# Patient Record
Sex: FEMALE | Race: WHITE | NOT HISPANIC OR LATINO | Employment: OTHER | URBAN - METROPOLITAN AREA
[De-identification: names, ages, dates, MRNs, and addresses within clinical notes are randomized per-mention and may not be internally consistent; named-entity substitution may affect disease eponyms.]

---

## 2017-03-15 ENCOUNTER — LAB VISIT (OUTPATIENT)
Dept: LAB | Facility: HOSPITAL | Age: 52
End: 2017-03-15
Attending: INTERNAL MEDICINE
Payer: MEDICAID

## 2017-03-15 DIAGNOSIS — E06.3 CHRONIC LYMPHOCYTIC THYROIDITIS: ICD-10-CM

## 2017-03-15 DIAGNOSIS — E03.9 UNSPECIFIED HYPOTHYROIDISM: ICD-10-CM

## 2017-03-15 LAB
T3FREE SERPL-MCNC: 3.5 PG/ML
T4 FREE SERPL-MCNC: 1.12 NG/DL
TSH SERPL DL<=0.005 MIU/L-ACNC: 0.03 UIU/ML

## 2017-03-15 PROCEDURE — 84439 ASSAY OF FREE THYROXINE: CPT

## 2017-03-15 PROCEDURE — 84481 FREE ASSAY (FT-3): CPT

## 2017-03-15 PROCEDURE — 36415 COLL VENOUS BLD VENIPUNCTURE: CPT | Mod: PO

## 2017-03-15 PROCEDURE — 84443 ASSAY THYROID STIM HORMONE: CPT

## 2017-06-23 ENCOUNTER — LAB VISIT (OUTPATIENT)
Dept: LAB | Facility: HOSPITAL | Age: 52
End: 2017-06-23
Attending: INTERNAL MEDICINE
Payer: MEDICAID

## 2017-06-23 DIAGNOSIS — E03.9 UNSPECIFIED HYPOTHYROIDISM: Primary | ICD-10-CM

## 2017-06-23 DIAGNOSIS — E03.9 UNSPECIFIED HYPOTHYROIDISM: ICD-10-CM

## 2017-06-23 LAB
T3FREE SERPL-MCNC: 2.7 PG/ML
T4 FREE SERPL-MCNC: 0.85 NG/DL
TSH SERPL DL<=0.005 MIU/L-ACNC: 7.56 UIU/ML

## 2017-06-23 PROCEDURE — 84443 ASSAY THYROID STIM HORMONE: CPT

## 2017-06-23 PROCEDURE — 84481 FREE ASSAY (FT-3): CPT

## 2017-06-23 PROCEDURE — 84439 ASSAY OF FREE THYROXINE: CPT

## 2017-06-23 PROCEDURE — 36415 COLL VENOUS BLD VENIPUNCTURE: CPT | Mod: PO

## 2017-07-06 ENCOUNTER — HOSPITAL ENCOUNTER (OUTPATIENT)
Dept: RADIOLOGY | Facility: CLINIC | Age: 52
Discharge: HOME | End: 2017-07-06
Attending: INTERNAL MEDICINE
Payer: MEDICAID

## 2017-07-06 DIAGNOSIS — Z78.0 MENOPAUSE: ICD-10-CM

## 2017-07-06 DIAGNOSIS — Z78.0 MENOPAUSE: Primary | ICD-10-CM

## 2017-07-06 PROCEDURE — 77080 DXA BONE DENSITY AXIAL: CPT | Mod: 26,,, | Performed by: RADIOLOGY

## 2017-07-06 PROCEDURE — 77080 DXA BONE DENSITY AXIAL: CPT | Mod: TC,PO

## 2017-10-16 ENCOUNTER — LAB VISIT (OUTPATIENT)
Dept: LAB | Facility: HOSPITAL | Age: 52
End: 2017-10-16
Attending: INTERNAL MEDICINE
Payer: MEDICAID

## 2017-10-16 DIAGNOSIS — E06.3 CHRONIC LYMPHOCYTIC THYROIDITIS: ICD-10-CM

## 2017-10-16 DIAGNOSIS — E03.9 MYXEDEMA HEART DISEASE: ICD-10-CM

## 2017-10-16 DIAGNOSIS — I51.9 MYXEDEMA HEART DISEASE: Primary | ICD-10-CM

## 2017-10-16 DIAGNOSIS — I51.9 MYXEDEMA HEART DISEASE: ICD-10-CM

## 2017-10-16 DIAGNOSIS — E03.9 MYXEDEMA HEART DISEASE: Primary | ICD-10-CM

## 2017-10-16 PROCEDURE — 84439 ASSAY OF FREE THYROXINE: CPT

## 2017-10-16 PROCEDURE — 84443 ASSAY THYROID STIM HORMONE: CPT

## 2017-10-16 PROCEDURE — 36415 COLL VENOUS BLD VENIPUNCTURE: CPT | Mod: PO

## 2017-10-17 LAB
T4 FREE SERPL-MCNC: 0.88 NG/DL
TSH SERPL DL<=0.005 MIU/L-ACNC: 2.53 UIU/ML

## 2017-11-09 ENCOUNTER — DOCUMENTATION ONLY (OUTPATIENT)
Dept: FAMILY MEDICINE | Facility: CLINIC | Age: 52
End: 2017-11-09

## 2017-11-09 ENCOUNTER — TELEPHONE (OUTPATIENT)
Dept: FAMILY MEDICINE | Facility: CLINIC | Age: 52
End: 2017-11-09

## 2017-11-09 NOTE — TELEPHONE ENCOUNTER
----- Message from Elsi Drew sent at 11/9/2017  1:40 PM CST -----  Patient is experiencing stomach issues/would like to be seen today or tomorrow/no appointment showing available/please call patient back at [phone] to schedule or advise.

## 2017-11-09 NOTE — PROGRESS NOTES
Pre-Visit Chart Review  For Appointment Scheduled on 11/10/2017    Health Maintenance Due   Topic Date Due    TETANUS VACCINE  01/12/1982    Pap Smear with HPV Cotest  01/12/1985    Mammogram  01/12/2004    Colonoscopy  01/12/2014    Influenza Vaccine  08/01/2017

## 2017-11-09 NOTE — TELEPHONE ENCOUNTER
Patient has complaints of stomach ache,cramping and gas requesting appointment. Appointment scheduled.

## 2017-11-10 ENCOUNTER — TELEPHONE (OUTPATIENT)
Dept: FAMILY MEDICINE | Facility: CLINIC | Age: 52
End: 2017-11-10

## 2017-11-10 ENCOUNTER — OFFICE VISIT (OUTPATIENT)
Dept: FAMILY MEDICINE | Facility: CLINIC | Age: 52
End: 2017-11-10
Payer: MEDICAID

## 2017-11-10 ENCOUNTER — HOSPITAL ENCOUNTER (OUTPATIENT)
Dept: RADIOLOGY | Facility: CLINIC | Age: 52
Discharge: HOME | End: 2017-11-10
Attending: PHYSICIAN ASSISTANT
Payer: MEDICAID

## 2017-11-10 VITALS
HEART RATE: 86 BPM | SYSTOLIC BLOOD PRESSURE: 122 MMHG | BODY MASS INDEX: 25.84 KG/M2 | WEIGHT: 140.44 LBS | TEMPERATURE: 99 F | DIASTOLIC BLOOD PRESSURE: 75 MMHG | HEIGHT: 62 IN

## 2017-11-10 DIAGNOSIS — R10.13 EPIGASTRIC PAIN: ICD-10-CM

## 2017-11-10 DIAGNOSIS — R10.13 EPIGASTRIC PAIN: Primary | ICD-10-CM

## 2017-11-10 PROCEDURE — 99214 OFFICE O/P EST MOD 30 MIN: CPT | Mod: S$PBB,,, | Performed by: PHYSICIAN ASSISTANT

## 2017-11-10 PROCEDURE — 76705 ECHO EXAM OF ABDOMEN: CPT | Mod: 26,,, | Performed by: RADIOLOGY

## 2017-11-10 PROCEDURE — 76705 ECHO EXAM OF ABDOMEN: CPT | Mod: TC,PO

## 2017-11-10 PROCEDURE — 99213 OFFICE O/P EST LOW 20 MIN: CPT | Mod: PBBFAC,25,PO | Performed by: PHYSICIAN ASSISTANT

## 2017-11-10 PROCEDURE — 99999 PR PBB SHADOW E&M-EST. PATIENT-LVL III: CPT | Mod: PBBFAC,,, | Performed by: PHYSICIAN ASSISTANT

## 2017-11-10 RX ORDER — PANTOPRAZOLE SODIUM 40 MG/1
40 TABLET, DELAYED RELEASE ORAL DAILY
Qty: 30 TABLET | Refills: 2 | Status: SHIPPED | OUTPATIENT
Start: 2017-11-10 | End: 2021-09-16 | Stop reason: DRUGHIGH

## 2017-11-10 NOTE — PROGRESS NOTES
Subjective:       Patient ID: Leobardo Rueda is a 52 y.o. female.    Chief Complaint: Abdominal Pain; Abdominal Cramping; and Gas    Abdominal Pain   This is a new problem. The current episode started in the past 7 days. The onset quality is gradual. The problem occurs intermittently. The problem has been waxing and waning. The pain is located in the epigastric region. The pain is at a severity of 7/10. The quality of the pain is colicky, aching and a sensation of fullness. The abdominal pain does not radiate. Associated symptoms include belching, flatus and nausea. Pertinent negatives include no arthralgias, constipation, diarrhea, fever, frequency, headaches, hematochezia, hematuria, melena, myalgias or vomiting. The pain is relieved by nothing. Treatments tried: peptobismol. The treatment provided mild relief. There is no history of abdominal surgery, Crohn's disease, gallstones, GERD, irritable bowel syndrome, pancreatitis, PUD or ulcerative colitis. Patient's medical history does not include kidney stones.     Review of Systems   Constitutional: Negative for activity change, appetite change, chills, fatigue and fever.   Eyes: Negative for visual disturbance.   Respiratory: Negative for cough and shortness of breath.    Cardiovascular: Negative for chest pain, palpitations and leg swelling.   Gastrointestinal: Positive for abdominal pain, flatus and nausea. Negative for constipation, diarrhea, hematochezia, melena and vomiting.   Genitourinary: Negative for frequency and hematuria.   Musculoskeletal: Negative for arthralgias and myalgias.   Neurological: Negative for dizziness, weakness, light-headedness and headaches.       Objective:      Vitals:    11/10/17 1005   BP: 122/75   Pulse: 86   Temp: 99 °F (37.2 °C)     Physical Exam   Constitutional: She appears well-developed and well-nourished.   HENT:   Head: Normocephalic and atraumatic.   Right Ear: Hearing and external ear normal.   Left Ear: Hearing and  external ear normal.   Nose: Nose normal.   Mouth/Throat: Oropharynx is clear and moist.   Eyes: Conjunctivae and EOM are normal. Pupils are equal, round, and reactive to light.   Cardiovascular: Normal rate, regular rhythm, normal heart sounds and intact distal pulses.    Pulmonary/Chest: Effort normal and breath sounds normal.   Abdominal: Soft. Bowel sounds are normal. There is tenderness in the epigastric area and left upper quadrant. There is no rigidity, no rebound, no guarding and negative Li's sign.   Skin: Skin is warm and dry.   Vitals reviewed.      Assessment:       1. Epigastric pain        Plan:       Epigastric pain  -     CBC auto differential; Future; Expected date: 11/24/2017  -     Comprehensive metabolic panel; Future; Expected date: 11/24/2017  -     Amylase; Future; Expected date: 11/24/2017  -     Lipase; Future; Expected date: 11/24/2017        -     US Abdomen Limited; Future; Expected date: 11/10/2017  -     pantoprazole (PROTONIX) 40 MG tablet; Take 1 tablet (40 mg total) by mouth once daily.  Dispense: 30 tablet; Refill: 2         Will make further recommendations pending above work up    Follow up in 2 weeks  Will address health maintenance at follow up visits.

## 2017-11-10 NOTE — TELEPHONE ENCOUNTER
----- Message from Hortensia Watkins sent at 11/10/2017  3:08 PM CST -----  Contact: self  Call placed to pod.  Patient is returning your call, please call.

## 2017-11-10 NOTE — TELEPHONE ENCOUNTER
Patient state that she start medication today and is still having stomach pain. Advise patient to give medication a chance to work, to give medication a week and if not better to call office. Patient verbalized understanding.

## 2017-11-11 ENCOUNTER — NURSE TRIAGE (OUTPATIENT)
Dept: ADMINISTRATIVE | Facility: CLINIC | Age: 52
End: 2017-11-11

## 2017-11-12 NOTE — TELEPHONE ENCOUNTER
Reason for Disposition   Headache (all triage questions negative)    Protocols used: ST HEADACHE-A-AH    Patient thinks she is having headaches because it is a side effect of the protonix. Patient advised to call her pharmacy to see if this is a side effect, but she was advised to go to urgent care tomorrow to see if they could prescribe another class of antacid medication to help with stomach pain. Patient verbalized understanding.

## 2017-11-13 ENCOUNTER — TELEPHONE (OUTPATIENT)
Dept: FAMILY MEDICINE | Facility: CLINIC | Age: 52
End: 2017-11-13

## 2017-11-13 DIAGNOSIS — R74.8 ELEVATED LIPASE: ICD-10-CM

## 2017-11-13 DIAGNOSIS — R10.13 EPIGASTRIC PAIN: Primary | ICD-10-CM

## 2017-11-13 NOTE — TELEPHONE ENCOUNTER
Please let her know that one of her pancreatic enzymes is slightly elevated; however, this is not suggestive of acute pancreatitis until a much higher level. In addition, abdominal US showed normal pancreas. Electrolytes, liver enzymes, and kidney function test are normal. Blood counts are also normal. Recommend increasing water intake and avoid fatty foods. Avoid alcohol. Recommend bland diet. Continue protonix as prescribed. I would like to refer her to GI for further evaluation.    If she develops any worsening abdominal pain or nausea/vomiting then I recommend she go to ER.

## 2017-11-13 NOTE — TELEPHONE ENCOUNTER
----- Message from [place], [place] sent at 11/13/2017 11:27 AM CST -----  Contact: self [phone]      ----- Message -----  From: Marybeth rG  Sent: 11/13/2017   7:56 AM  To: Jessica [facility]ALLERO Staff    Please call her with the results of her lab tests.  Also she is still having symptoms. Medication helps some but symptoms come back with a vengeance.  She still cannot eat. Thank you!

## 2017-11-13 NOTE — TELEPHONE ENCOUNTER
Patient notified that her pancreatic enzymes is slightly elevated; however, this is not suggestive of acute pancreatitis until a much higher level. In addition, abdominal US showed normal pancreas. Electrolytes, liver enzymes, and kidney function test are normal. Blood counts are also normal. Recommend increasing water intake and avoid fatty foods. Avoid alcohol. Recommend bland diet. Continue protonix as prescribed. I would like to refer her to GI for further evaluation.     If she develops any worsening abdominal pain or nausea/vomiting then I recommend she go to ER.   Also advised patient to call medicaid to see what GI she can go to. Patient verbalized understanding.

## 2017-12-04 ENCOUNTER — DOCUMENTATION ONLY (OUTPATIENT)
Dept: FAMILY MEDICINE | Facility: CLINIC | Age: 52
End: 2017-12-04

## 2017-12-04 NOTE — PROGRESS NOTES
Pre-Visit Chart Review  For Appointment Scheduled on 12/5/17    Health Maintenance Due   Topic Date Due    TETANUS VACCINE  01/12/1982    Pap Smear with HPV Cotest  01/12/1985    Mammogram  01/12/2004    Colonoscopy  01/12/2014    Influenza Vaccine  08/01/2017

## 2017-12-05 ENCOUNTER — OFFICE VISIT (OUTPATIENT)
Dept: FAMILY MEDICINE | Facility: CLINIC | Age: 52
End: 2017-12-05
Payer: MEDICAID

## 2017-12-05 VITALS
SYSTOLIC BLOOD PRESSURE: 109 MMHG | OXYGEN SATURATION: 97 % | HEIGHT: 62 IN | BODY MASS INDEX: 25.97 KG/M2 | DIASTOLIC BLOOD PRESSURE: 63 MMHG | TEMPERATURE: 99 F | HEART RATE: 72 BPM | WEIGHT: 141.13 LBS

## 2017-12-05 DIAGNOSIS — K21.9 GASTROESOPHAGEAL REFLUX DISEASE, ESOPHAGITIS PRESENCE NOT SPECIFIED: ICD-10-CM

## 2017-12-05 DIAGNOSIS — F41.1 GENERALIZED ANXIETY DISORDER: Primary | ICD-10-CM

## 2017-12-05 DIAGNOSIS — K59.00 CONSTIPATION, UNSPECIFIED CONSTIPATION TYPE: ICD-10-CM

## 2017-12-05 PROCEDURE — 99214 OFFICE O/P EST MOD 30 MIN: CPT | Mod: S$PBB,,, | Performed by: PHYSICIAN ASSISTANT

## 2017-12-05 PROCEDURE — 99214 OFFICE O/P EST MOD 30 MIN: CPT | Mod: PBBFAC,PO | Performed by: PHYSICIAN ASSISTANT

## 2017-12-05 PROCEDURE — 99999 PR PBB SHADOW E&M-EST. PATIENT-LVL IV: CPT | Mod: PBBFAC,,, | Performed by: PHYSICIAN ASSISTANT

## 2017-12-05 RX ORDER — BUSPIRONE HYDROCHLORIDE 5 MG/1
5 TABLET ORAL 2 TIMES DAILY PRN
Qty: 30 TABLET | Refills: 0 | Status: SHIPPED | OUTPATIENT
Start: 2017-12-05 | End: 2020-08-14

## 2017-12-05 RX ORDER — POLYETHYLENE GLYCOL 3350 17 G/17G
17 POWDER, FOR SOLUTION ORAL DAILY
Qty: 119 G | Refills: 0 | Status: SHIPPED | OUTPATIENT
Start: 2017-12-05 | End: 2020-08-14

## 2017-12-05 NOTE — PROGRESS NOTES
Subjective:       Patient ID: Leobardo Rueda is a 52 y.o. female.    Chief Complaint: follow up abdomen pain    Mrs. Rueda is a 52 year old female who presents to clinic for 2 week follow up on abdominal pain. She completed abdominal US, which was unrevealing. She reports epigastric pain resolved within 1 week of starting PPI. She continues to have intermittent episodes of constipation and she feels like when she does have a BM she states the amount is small and she feels like she has incomplete emptying. She denies diarrhea, change in appetite, nausea, or vomiting. She is concerns her irregular bowel movements are secondary to her anxiety. She also complains of generalized anxiety for most of her life. She was previously treated with zoloft or xanax (she can not remember which) and this was discontinued when she changed insurances. She was prescribed lexapro several months ago by another practitioner, but she decided not to fill it due to concern of side effects. LO 7 is consistent with moderate anxiety. She denies depression or SI/HI.       Review of Systems   Constitutional: Negative for activity change, appetite change, chills, fatigue and fever.   Eyes: Negative for visual disturbance.   Respiratory: Negative for cough and shortness of breath.    Cardiovascular: Negative for chest pain, palpitations and leg swelling.   Gastrointestinal: Positive for constipation. Negative for abdominal pain, diarrhea, nausea and vomiting.   Musculoskeletal: Negative for arthralgias.   Neurological: Negative for dizziness, weakness, light-headedness and headaches.   Psychiatric/Behavioral: Negative for agitation, dysphoric mood, sleep disturbance and suicidal ideas. The patient is nervous/anxious.        Objective:      Vitals:    12/05/17 1703   BP: 109/63   Pulse: 72   Temp: 98.8 °F (37.1 °C)     Physical Exam   Constitutional: She appears well-developed and well-nourished.   HENT:   Head: Normocephalic and atraumatic.    Eyes: Conjunctivae and EOM are normal. Pupils are equal, round, and reactive to light.   Cardiovascular: Normal rate, regular rhythm, normal heart sounds and intact distal pulses.    Pulmonary/Chest: Effort normal and breath sounds normal.   Abdominal: Soft. Normal appearance. There is no tenderness. There is no rigidity, no rebound and no guarding.   Skin: Skin is warm and dry.   Psychiatric: Her speech is normal and behavior is normal. Her mood appears anxious.   Vitals reviewed.      Assessment:       1. Generalized anxiety disorder    2. Gastroesophageal reflux disease, esophagitis presence not specified    3. Constipation, unspecified constipation type        Plan:       Generalized anxiety disorder  -     busPIRone (BUSPAR) 5 MG Tab; Take 1 tablet (5 mg total) by mouth 2 (two) times daily as needed (anxiety).  Dispense: 30 tablet; Refill: 0  - I discussed with the patient the risks, side effects and the benefits of the medication including the black box warning regarding suicidal ideation/risk if applicable.  I counseled the patient on medication titration, length of time before maximum benefits are reached, and duration of treatment expected.  I advised the patient to return to clinic or go to the emergency department if suicidal thoughts occur, thought of hurting others, hallucinations, or other serious symptoms.  Patient voiced no intention of self-harm.  The patient expressed verbal understanding and elected to proceed with treatment.  All questions were answered.           - Email update in 2 weeks    Gastroesophageal reflux disease, esophagitis presence not specified        - Continue protonix 40 mg daily. Consider tapering medication in 8 weeks.     Constipation, unspecified constipation type  -     polyethylene glycol (GLYCOLAX) 17 gram/dose powder; Take 17 g by mouth once daily.  Dispense: 119 g; Refill: 0        -  If irregular bowel patterns persists then referral to GI for colonoscopy and further  evaluation    Follow up in 4 weeks

## 2018-06-13 ENCOUNTER — LAB VISIT (OUTPATIENT)
Dept: LAB | Facility: HOSPITAL | Age: 53
End: 2018-06-13
Attending: INTERNAL MEDICINE

## 2018-06-13 DIAGNOSIS — E06.3 CHRONIC LYMPHOCYTIC THYROIDITIS: ICD-10-CM

## 2018-06-13 DIAGNOSIS — M85.9 DISORDER OF BONE DENSITY AND STRUCTURE, UNSPECIFIED: ICD-10-CM

## 2018-06-13 DIAGNOSIS — E03.9 MYXEDEMA HEART DISEASE: Primary | ICD-10-CM

## 2018-06-13 DIAGNOSIS — I51.9 MYXEDEMA HEART DISEASE: Primary | ICD-10-CM

## 2018-06-13 LAB
25(OH)D3+25(OH)D2 SERPL-MCNC: 31 NG/ML
T4 FREE SERPL-MCNC: 0.94 NG/DL
TSH SERPL DL<=0.005 MIU/L-ACNC: 3.98 UIU/ML

## 2018-06-13 PROCEDURE — 84443 ASSAY THYROID STIM HORMONE: CPT

## 2018-06-13 PROCEDURE — 82306 VITAMIN D 25 HYDROXY: CPT

## 2018-06-13 PROCEDURE — 36415 COLL VENOUS BLD VENIPUNCTURE: CPT | Mod: PO

## 2018-06-13 PROCEDURE — 84439 ASSAY OF FREE THYROXINE: CPT

## 2018-11-01 ENCOUNTER — LAB VISIT (OUTPATIENT)
Dept: LAB | Facility: HOSPITAL | Age: 53
End: 2018-11-01
Attending: INTERNAL MEDICINE

## 2018-11-01 DIAGNOSIS — E03.9 MYXEDEMA HEART DISEASE: Primary | ICD-10-CM

## 2018-11-01 DIAGNOSIS — E06.3 CHRONIC LYMPHOCYTIC THYROIDITIS: ICD-10-CM

## 2018-11-01 DIAGNOSIS — I51.9 MYXEDEMA HEART DISEASE: Primary | ICD-10-CM

## 2018-11-01 LAB
T4 FREE SERPL-MCNC: 1.03 NG/DL
TSH SERPL DL<=0.005 MIU/L-ACNC: 1.71 UIU/ML

## 2018-11-01 PROCEDURE — 36415 COLL VENOUS BLD VENIPUNCTURE: CPT | Mod: PO

## 2018-11-01 PROCEDURE — 84439 ASSAY OF FREE THYROXINE: CPT

## 2018-11-01 PROCEDURE — 84443 ASSAY THYROID STIM HORMONE: CPT

## 2018-11-05 ENCOUNTER — HOSPITAL ENCOUNTER (OUTPATIENT)
Dept: RADIOLOGY | Facility: CLINIC | Age: 53
Discharge: HOME | End: 2018-11-05
Attending: INTERNAL MEDICINE

## 2018-11-05 DIAGNOSIS — I51.9 MYXEDEMA HEART DISEASE: ICD-10-CM

## 2018-11-05 DIAGNOSIS — E06.3 CHRONIC LYMPHOCYTIC THYROIDITIS: ICD-10-CM

## 2018-11-05 DIAGNOSIS — E03.9 MYXEDEMA HEART DISEASE: ICD-10-CM

## 2018-11-05 PROCEDURE — 76536 US EXAM OF HEAD AND NECK: CPT | Mod: TC,PO

## 2018-11-05 PROCEDURE — 76536 US EXAM OF HEAD AND NECK: CPT | Mod: 26,,, | Performed by: RADIOLOGY

## 2019-03-11 ENCOUNTER — OFFICE VISIT (OUTPATIENT)
Dept: URGENT CARE | Facility: CLINIC | Age: 54
End: 2019-03-11

## 2019-03-11 VITALS
HEART RATE: 74 BPM | HEIGHT: 62 IN | DIASTOLIC BLOOD PRESSURE: 76 MMHG | RESPIRATION RATE: 12 BRPM | WEIGHT: 142.63 LBS | TEMPERATURE: 98 F | OXYGEN SATURATION: 99 % | SYSTOLIC BLOOD PRESSURE: 119 MMHG | BODY MASS INDEX: 26.25 KG/M2

## 2019-03-11 DIAGNOSIS — J01.90 ACUTE NON-RECURRENT SINUSITIS, UNSPECIFIED LOCATION: ICD-10-CM

## 2019-03-11 DIAGNOSIS — H10.9 CONJUNCTIVITIS OF RIGHT EYE, UNSPECIFIED CONJUNCTIVITIS TYPE: Primary | ICD-10-CM

## 2019-03-11 PROCEDURE — 96372 PR INJECTION,THERAP/PROPH/DIAG2ST, IM OR SUBCUT: ICD-10-PCS | Mod: S$GLB,,, | Performed by: NURSE PRACTITIONER

## 2019-03-11 PROCEDURE — 99204 OFFICE O/P NEW MOD 45 MIN: CPT | Mod: 25,S$GLB,, | Performed by: NURSE PRACTITIONER

## 2019-03-11 PROCEDURE — 99204 PR OFFICE/OUTPT VISIT, NEW, LEVL IV, 45-59 MIN: ICD-10-PCS | Mod: 25,S$GLB,, | Performed by: NURSE PRACTITIONER

## 2019-03-11 PROCEDURE — 96372 THER/PROPH/DIAG INJ SC/IM: CPT | Mod: S$GLB,,, | Performed by: NURSE PRACTITIONER

## 2019-03-11 RX ORDER — FLUTICASONE PROPIONATE 50 MCG
2 SPRAY, SUSPENSION (ML) NASAL 2 TIMES DAILY
Qty: 1 BOTTLE | Refills: 0 | Status: SHIPPED | OUTPATIENT
Start: 2019-03-11 | End: 2020-08-14 | Stop reason: SDUPTHER

## 2019-03-11 RX ORDER — DEXAMETHASONE SODIUM PHOSPHATE 4 MG/ML
8 INJECTION, SOLUTION INTRA-ARTICULAR; INTRALESIONAL; INTRAMUSCULAR; INTRAVENOUS; SOFT TISSUE
Status: COMPLETED | OUTPATIENT
Start: 2019-03-11 | End: 2019-03-11

## 2019-03-11 RX ORDER — PREDNISONE 20 MG/1
20 TABLET ORAL 2 TIMES DAILY
Qty: 10 TABLET | Refills: 0 | Status: SHIPPED | OUTPATIENT
Start: 2019-03-11 | End: 2019-03-16

## 2019-03-11 RX ORDER — CETIRIZINE HYDROCHLORIDE 10 MG/1
10 TABLET ORAL DAILY
Qty: 30 TABLET | Refills: 2 | Status: SHIPPED | OUTPATIENT
Start: 2019-03-11 | End: 2020-08-14

## 2019-03-11 RX ORDER — ERYTHROMYCIN 5 MG/G
OINTMENT OPHTHALMIC 3 TIMES DAILY
Qty: 3.5 G | Refills: 0 | Status: SHIPPED | OUTPATIENT
Start: 2019-03-11 | End: 2020-08-14

## 2019-03-11 RX ADMIN — DEXAMETHASONE SODIUM PHOSPHATE 8 MG: 4 INJECTION, SOLUTION INTRA-ARTICULAR; INTRALESIONAL; INTRAMUSCULAR; INTRAVENOUS; SOFT TISSUE at 01:03

## 2019-03-11 NOTE — PATIENT INSTRUCTIONS
Symptomatic treatment:    Alternate Tylenol and Ibuprofen every 3 hrs for fever, pain and inflammation. Avoid Nsaids if you are pregnant or have advanced kidney disease.     salt water gargles to soothe throat from post nasal drip  Honey/lemon water or warm tea to soothe throat from post nasal drip  Cepachol helps to soothe the discomfort in throat from post nasal drip    Cold-eeze helps to reduce the duration of URI symptoms if taken early  Elderberry to reduce duration of viral URI symptoms    Nasal saline spray reduces inflammation and dryness  Warm face compresses/hot showers as often as you can to open sinuses and allow to drain.   Flonase OTC or Nasacort OTC to help decrease inflammation in nasal turbinates and allow sinuses to drain    Vicks vapor rub at night  Simple foods like chicken noodle soup help provide hydration and nutrition    Delsym helps with coughing at night    Zantac will help if there is reflux from the post nasal drip and helpful to take at night    Zyrtec/Claritin during the day and Benadryl at night may help if allergy component concurrently with URI    Rest as much as you can    Your symptoms will likely last 5-7 days, maybe longer depending on how it affects your body.  You are contagious 5-7, so minimize contact with others to reduce the spread to others and stay home from work or school as we discussed. Dehydration is preventable but is one of the main reasons why you will feel so badly. Drink pedialyte, gatorade or propel. Stay hydrated.  Antibiotics are not needed unless a complication(such as Otitis Media, Bacterial sinus infection or pneumonia) develops. Taking antibiotics for Flu/Cold is not supported by evidence-based medicine and can expose you to unnecessary side effects of the medication, such as anaphylaxis, yeast infection and leads to antibiotic resistance.   If you experience any:  Chest pain, shortness of breath, wheezing or difficulty breathing,  Severe headache, face,  neck or ear pain,  New rash,  Fever over 101.5º F (38.6 C) for more than three days,  Confusion, behavior change or seizure,  Severe weakness or dizziness, please go to the ER immediately for further testing.             Conjunctivitis, Nonspecific    The membrane that covers the white part of your eye (the conjunctiva) is inflamed. Inflammation happens when your body responds to an injury, allergic reaction, infection, or illness. Symptoms of inflammation in the eye may include redness, irritation, itching, swelling, or burning. These symptoms should go away within the next 24 hours. Conjunctivitis may be related to a particle that was in your eye. If so, it may wash out with your tears or irrigation treatment. Being exposed to liquid chemicals or fumes may also cause this reaction.   Home care  · Apply a cold pack (ice in a plastic bag, wrapped in a towel) over the eye for 20 minutes at a time. This will reduce pain.  · Artificial tears may be prescribed to reduce irritation or redness.  These should be used 3 to 4 times a day.  · You may use acetaminophen or ibuprofen to control pain, unless another medicine was prescribed.(Note: If you have chronic liver or kidney disease, or if you have ever had a stomach ulcer or gastrointestinal bleeding, talk with your healthcare provider before using these medicines.)  Follow-up care  Follow up with your healthcare provider, or as advised.  When to seek medical advice  Call your healthcare provider right away if any of these occur:  · Increased eyelid swelling  · Increased eye pain  · Increased redness or drainage from the eye  · Increased blurry vision or increased sensitivity to light  · Failure of normal vision to return within 24 to 48 hours  Date Last Reviewed: 6/14/2015 © 2000-2017 [employer]. [address] [place] [address], [place] [address]. All rights reserved. This information is not intended as a substitute for professional medical care. Always follow  your healthcare professional's instructions.        Acute Sinusitis    Acute sinusitis is irritation and swelling of the sinuses. It is usually caused by a viral infection after a common cold. Your doctor can help you find relief.  What is acute sinusitis?  Sinuses are air-filled spaces in the skull behind the face. They are kept moist and clean by a lining of mucosa. Things such as pollen, smoke, and chemical fumes can irritate the mucosa. It can then swell up. As a response to irritation, the mucosa makes more mucus and other fluids. Tiny hairlike cilia cover the mucosa. Cilia help carry mucus toward the opening of the sinus. Too much mucus may cause the cilia to stop working. This blocks the sinus opening. A buildup of fluid in the sinuses then causes pain and pressure. It can also encourage bacteria to grow in the sinuses.  Common symptoms of acute sinusitis  You may have:  · Facial soreness pain  · Headache  · Fever  · Fluid draining in the back of the throat (postnasal drip)  · Congestion  · Drainage that is thick and colored, instead of clear  · Cough  Diagnosing acute sinusitis  Your doctor will ask about your symptoms and health history. He or she will look at your ear, nose, and throat. You usually won't need to have X-rays taken.    The doctor may take a sample of mucus to check for bacteria. If you have sinusitis that keeps coming back, you may need imaging tests such as X-rays or CAT scans. This will help your doctor check for a structural problem that may be causing the infection.  Treating acute sinusitis  Treatment is aimed at unblocking the sinus opening and helping the cilia work again. You may need to take antihistamine and decongestant medicine. These can reduce inflammation and decrease the amount of fluid your sinuses make. If you have a bacterial infection, you will need to take antibiotic medicine for 10 to 14 days. Take this medicine until it is gone, even if you feel better.  Date Last  Reviewed: 10/1/2016  © 7780-6251 [employer]. [address] [place] [address], [place] [address]. All rights reserved. This information is not intended as a substitute for professional medical care. Always follow your healthcare professional's instructions.

## 2019-03-11 NOTE — PROGRESS NOTES
"Subjective:       Patient ID: Leobardo Rueda is a 54 y.o. female.    Vitals:  height is 5' 2" (1.575 m) and weight is 64.7 kg (142 lb 9.6 oz). Her oral temperature is 97.7 °F (36.5 °C). Her blood pressure is 119/76 and her pulse is 74. Her respiration is 12 and oxygen saturation is 99%.     Chief Complaint: Conjunctivitis    Conjunctivitis   This is a new problem. The current episode started in the past 7 days. The problem occurs constantly. The problem has been gradually improving. Associated symptoms include congestion, coughing, fatigue and a sore throat. Pertinent negatives include no arthralgias, chest pain, chills, fever, headaches, joint swelling, myalgias, nausea, rash, vertigo or vomiting.       Constitution: Positive for fatigue. Negative for chills and fever.   HENT: Positive for congestion, postnasal drip, sinus pain, sinus pressure and sore throat.    Neck: Negative for painful lymph nodes.   Cardiovascular: Negative for chest pain and leg swelling.   Eyes: Positive for eye discharge, eye itching and eye redness. Negative for double vision and blurred vision.   Respiratory: Positive for cough. Negative for shortness of breath.    Gastrointestinal: Negative for nausea, vomiting and diarrhea.   Genitourinary: Negative for dysuria, frequency, urgency and history of kidney stones.   Musculoskeletal: Negative for joint pain, joint swelling, muscle cramps and muscle ache.   Skin: Negative for color change, pale, rash and bruising.   Allergic/Immunologic: Negative for seasonal allergies.   Neurological: Negative for dizziness, history of vertigo, light-headedness, passing out and headaches.   Hematologic/Lymphatic: Negative for swollen lymph nodes.   Psychiatric/Behavioral: Negative for nervous/anxious, sleep disturbance and depression. The patient is not nervous/anxious.        Objective:      Physical Exam   Constitutional: She is oriented to person, place, and time. Vital signs are normal. She appears " well-developed and well-nourished. She is cooperative.   HENT:   Head: Normocephalic.   Right Ear: Hearing, external ear and ear canal normal. A middle ear effusion is present.   Left Ear: Hearing, external ear and ear canal normal. A middle ear effusion is present.   Nose: Mucosal edema and rhinorrhea present. Right sinus exhibits maxillary sinus tenderness. Left sinus exhibits maxillary sinus tenderness.   Mouth/Throat: Uvula is midline and mucous membranes are normal. Posterior oropharyngeal erythema present.   Eyes: EOM and lids are normal. Pupils are equal, round, and reactive to light. Right eye exhibits chemosis. Right conjunctiva is injected.   Neck: Trachea normal, normal range of motion, full passive range of motion without pain and phonation normal. Neck supple.   Cardiovascular: Normal rate, regular rhythm, normal heart sounds, intact distal pulses and normal pulses.   Pulmonary/Chest: Effort normal and breath sounds normal.   Abdominal: Soft. Normal appearance, normal aorta and bowel sounds are normal. There is no tenderness.   Musculoskeletal: Normal range of motion.   Neurological: She is alert and oriented to person, place, and time. She has normal strength. GCS eye subscore is 4. GCS verbal subscore is 5. GCS motor subscore is 6.   Skin: Skin is warm, dry and intact. Capillary refill takes less than 2 seconds.   Psychiatric: She has a normal mood and affect. Her speech is normal and behavior is normal. Judgment and thought content normal. Cognition and memory are normal.       Assessment:       1. Conjunctivitis of right eye, unspecified conjunctivitis type    2. Acute non-recurrent sinusitis, unspecified location        Plan:         Conjunctivitis of right eye, unspecified conjunctivitis type    Acute non-recurrent sinusitis, unspecified location    Other orders  -     erythromycin (ROMYCIN) ophthalmic ointment; Place into the right eye 3 (three) times daily.  Dispense: 3.5 g; Refill: 0  -      dexamethasone injection 8 mg  -     cetirizine (ZYRTEC) 10 MG tablet; Take 1 tablet (10 mg total) by mouth once daily.  Dispense: 30 tablet; Refill: 2  -     fluticasone (FLONASE) 50 mcg/actuation nasal spray; 2 sprays (100 mcg total) by Each Nare route 2 (two) times daily.  Dispense: 1 Bottle; Refill: 0  -     predniSONE (DELTASONE) 20 MG tablet; Take 1 tablet (20 mg total) by mouth 2 (two) times daily. for 5 days  Dispense: 10 tablet; Refill: 0

## 2019-09-16 ENCOUNTER — LAB VISIT (OUTPATIENT)
Dept: LAB | Facility: HOSPITAL | Age: 54
End: 2019-09-16
Attending: INTERNAL MEDICINE

## 2019-09-16 DIAGNOSIS — E06.3 CHRONIC LYMPHOCYTIC THYROIDITIS: ICD-10-CM

## 2019-09-16 DIAGNOSIS — I51.9 MYXEDEMA HEART DISEASE: Primary | ICD-10-CM

## 2019-09-16 DIAGNOSIS — E03.9 MYXEDEMA HEART DISEASE: Primary | ICD-10-CM

## 2019-09-16 PROCEDURE — 84443 ASSAY THYROID STIM HORMONE: CPT

## 2019-09-16 PROCEDURE — 84439 ASSAY OF FREE THYROXINE: CPT

## 2019-09-16 PROCEDURE — 36415 COLL VENOUS BLD VENIPUNCTURE: CPT | Mod: PO

## 2019-09-17 LAB
T4 FREE SERPL-MCNC: 0.97 NG/DL (ref 0.71–1.51)
TSH SERPL DL<=0.005 MIU/L-ACNC: 1.94 UIU/ML (ref 0.4–4)

## 2020-08-11 ENCOUNTER — OFFICE VISIT (OUTPATIENT)
Dept: PRIMARY CARE CLINIC | Facility: CLINIC | Age: 55
End: 2020-08-11
Payer: OTHER GOVERNMENT

## 2020-08-11 ENCOUNTER — TELEPHONE (OUTPATIENT)
Dept: FAMILY MEDICINE | Facility: CLINIC | Age: 55
End: 2020-08-11

## 2020-08-11 VITALS
HEART RATE: 90 BPM | OXYGEN SATURATION: 99 % | DIASTOLIC BLOOD PRESSURE: 63 MMHG | SYSTOLIC BLOOD PRESSURE: 122 MMHG | RESPIRATION RATE: 16 BRPM | TEMPERATURE: 98 F

## 2020-08-11 DIAGNOSIS — Z20.822 SUSPECTED COVID-19 VIRUS INFECTION: Primary | ICD-10-CM

## 2020-08-11 DIAGNOSIS — R05.9 COUGH: ICD-10-CM

## 2020-08-11 PROCEDURE — 99203 PR OFFICE/OUTPT VISIT, NEW, LEVL III, 30-44 MIN: ICD-10-PCS | Mod: S$GLB,,, | Performed by: PHYSICIAN ASSISTANT

## 2020-08-11 PROCEDURE — 99203 OFFICE O/P NEW LOW 30 MIN: CPT | Mod: S$GLB,,, | Performed by: PHYSICIAN ASSISTANT

## 2020-08-11 PROCEDURE — U0003 INFECTIOUS AGENT DETECTION BY NUCLEIC ACID (DNA OR RNA); SEVERE ACUTE RESPIRATORY SYNDROME CORONAVIRUS 2 (SARS-COV-2) (CORONAVIRUS DISEASE [COVID-19]), AMPLIFIED PROBE TECHNIQUE, MAKING USE OF HIGH THROUGHPUT TECHNOLOGIES AS DESCRIBED BY CMS-2020-01-R: HCPCS

## 2020-08-11 NOTE — TELEPHONE ENCOUNTER
Pt's coworker just tested positive for COVID/. Pt states she has had some sinus symptoms for over a week and now has no sense of taste. She went to [facility] to be tested today and results are pending. She is wondering if she can get a steroid and zithromycin. Advised her that she would need a VV after results are in, as treatment varies based on time of illness and each pt's symptoms. Pt's check up in clinic has been cancelled, will reschedule when noninfectious. VV sched for Thurs at 1pm to discuss her symptoms, results and treatment.

## 2020-08-11 NOTE — PROGRESS NOTES
Subjective:        Time seen by provider: 1:35 PM on 08/11/2020    Leobardo Rueda is a 55 y.o. female with anxiety who presents for an evaluation of possible COVID-19. She complains of loss of smell, congestion, and HA. The patient states her symptoms began a few days ago and has had no known positive exposure. She denies nausea, vomiting, diarrhea, or any other symptoms at this time. No pertinent PSHx.     Review of Systems   Constitutional: Negative for activity change, appetite change, fatigue and fever.   HENT: Positive for congestion. Negative for rhinorrhea and sore throat.         Positive for loss of smell.    Respiratory: Negative for cough, chest tightness, shortness of breath and wheezing.    Cardiovascular: Negative for chest pain and palpitations.   Gastrointestinal: Negative for diarrhea, nausea and vomiting.   Musculoskeletal: Negative for arthralgias and myalgias.   Skin: Negative for rash.   Neurological: Positive for headaches. Negative for weakness, light-headedness and numbness.       Objective:      Physical Exam  Vitals signs and nursing note reviewed.   Constitutional:       General: She is not in acute distress.     Appearance: She is well-developed. She is not diaphoretic.   HENT:      Head: Normocephalic and atraumatic.      Nose: Nose normal.   Eyes:      Conjunctiva/sclera: Conjunctivae normal.   Neck:      Musculoskeletal: Normal range of motion.   Cardiovascular:      Rate and Rhythm: Normal rate and regular rhythm.      Heart sounds: Normal heart sounds. No murmur.   Pulmonary:      Effort: No respiratory distress.      Breath sounds: Normal breath sounds. No wheezing.   Musculoskeletal: Normal range of motion.   Skin:     General: Skin is warm and dry.   Neurological:      Mental Status: She is alert and oriented to person, place, and time.         Assessment and Plan:      Diagnoses and all orders for this visit:    Suspected Covid-19 Virus Infection  -     COVID-19 Routine  Screening  - Discharge home and await results.   - Return to clinic or ED for new or worsening symptoms.   - Follow-up with PCP as needed.     Scribe Attestation:   I, Kristin Diaz, am scribing for, and in the presence of, Aniyah Fritz PA-C. I performed the above scribed service and the documentation accurately describes the services I performed. I attest to the accuracy of the note.    I, Aniyah Fritz PA-C, personally performed the services described in this documentation. All medical record entries made by the scribe were at my direction and in my presence.  I have reviewed the chart and agree that the record reflects my personal performance and is accurate and complete. Aniyah Fritz PA-C.  2:31 PM 08/11/2020

## 2020-08-11 NOTE — TELEPHONE ENCOUNTER
----- Message from Shirin Stacy sent at 8/11/2020 11:34 AM CDT -----  Regarding: orders  Contact: patient  Patient will be establishing care August 13th want to know if Doctor can put orders in for covid-19 test, any questions please call back at [phone] (home)     Case number [id number]

## 2020-08-12 DIAGNOSIS — U07.1 COVID-19 VIRUS DETECTED: ICD-10-CM

## 2020-08-12 LAB — SARS-COV-2 RNA RESP QL NAA+PROBE: DETECTED

## 2020-08-13 ENCOUNTER — NURSE TRIAGE (OUTPATIENT)
Dept: ADMINISTRATIVE | Facility: CLINIC | Age: 55
End: 2020-08-13

## 2020-08-13 NOTE — TELEPHONE ENCOUNTER
Pt did not need Nurse.  Hit yes to text alert on accident.    Has a Virtual Visit with [facility] today at 13:00pm.    Gave pt the # to coty if she had any further questions about her coty.    Pt verbalized understanding.    Reason for Disposition   General information question, no triage required and triager able to answer question    Protocols used: INFORMATION ONLY CALL - NO TRIAGE-A-

## 2020-08-14 ENCOUNTER — OFFICE VISIT (OUTPATIENT)
Dept: FAMILY MEDICINE | Facility: CLINIC | Age: 55
End: 2020-08-14
Payer: OTHER GOVERNMENT

## 2020-08-14 ENCOUNTER — PATIENT MESSAGE (OUTPATIENT)
Dept: FAMILY MEDICINE | Facility: CLINIC | Age: 55
End: 2020-08-14

## 2020-08-14 VITALS — BODY MASS INDEX: 25.97 KG/M2 | WEIGHT: 142 LBS | TEMPERATURE: 98 F | RESPIRATION RATE: 16 BRPM

## 2020-08-14 DIAGNOSIS — U07.1 COVID-19 VIRUS INFECTION: Primary | ICD-10-CM

## 2020-08-14 PROCEDURE — 99203 PR OFFICE/OUTPT VISIT, NEW, LEVL III, 30-44 MIN: ICD-10-PCS | Mod: 95,,, | Performed by: INTERNAL MEDICINE

## 2020-08-14 PROCEDURE — 99203 OFFICE O/P NEW LOW 30 MIN: CPT | Mod: 95,,, | Performed by: INTERNAL MEDICINE

## 2020-08-14 RX ORDER — FLUTICASONE PROPIONATE 50 MCG
2 SPRAY, SUSPENSION (ML) NASAL 2 TIMES DAILY
Qty: 16 G | Refills: 1 | Status: SHIPPED | OUTPATIENT
Start: 2020-08-14 | End: 2021-08-16

## 2020-08-14 NOTE — PROGRESS NOTES
Assessment and Plan:    1. COVID-19 virus infection  We discussed recommendations for home isolation including recommendation to stay home unless requiring medical care. We discussed symptomatic management, no additional medications needed as her symptoms are mostly resolved. We discussed criteria for discontinuation of self isolation. We discussed closely monitoring symptoms and seeking medical care if developing severe difficulty breathing or other severe symptoms. We discussed the recommendation to stay in a designated room  from other household members and ideally use a separate bathroom. We discussed recommendation to wear a mask whenever around other people (even suspected to have same virus) and to cover all coughs and sneezes with disposable tissue. We discussed recommendation to wash hands regularly with soap and water for at least 20 seconds and avoid touching ones face. We discussed the recommendation to always call ahead before arriving at any healthcare facility including the ER. We discussed that if she needs to call 911, notify  that she has COVID 19.     ______________________________________________________________________  Subjective:    Chief Complaint:  Discuss COVID    HPI:  Leobardo is a 55 y.o. year old female with telemedicine visit today as consultation to discuss COVID.    The patient location is: Home  The chief complaint leading to consultation is: as above  Visit type: Virtual visit with synchronous audio and video initially, later audio only due to connection issues  Total time spent with patient: 20 minutes  Each patient to whom he or she provides medical services by telemedicine is:  (1) informed of the relationship between the physician and patient and the respective role of any other health care provider with respect to management of the patient; and (2) notified that he or she may decline to receive medical services by telemedicine and may withdraw from such care at  any time.    Symptoms started more than 2 weeks ago with feeling very exhausted. Had been around someone on July 25th who later tested positive for COVID. She had nausea and vomiting initially about 2 weeks ago. She had [place] out of [place] a [place] with these symptoms. She had gone to urgent care on 8/11 for loss of smell, congestion, and headache, and ended up testing positive for COVID at that time. Had lost her sense of smell 10 days ago.     She is currently having minimal symptoms. She is feeling overall much better with the exception of a sinus headache. She notes that this does improve with taking tylenol. Occasional minor cough. Energy level has improved compared to before. She has been working on drinking plenty of fluids. She is taking zinc and vitamin C.    Medications:  Current Outpatient Medications on File Prior to Visit   Medication Sig Dispense Refill    fluticasone (FLONASE) 50 mcg/actuation nasal spray 2 sprays (100 mcg total) by Each Nare route 2 (two) times daily. 1 Bottle 0    levothyroxine (SYNTHROID) 50 MCG tablet TAKE 1 TABLET(50 MCG) BY MOUTH EVERY DAY 30 tablet 2    pantoprazole (PROTONIX) 40 MG tablet Take 1 tablet (40 mg total) by mouth once daily. 30 tablet 2    [DISCONTINUED] busPIRone (BUSPAR) 5 MG Tab Take 1 tablet (5 mg total) by mouth 2 (two) times daily as needed (anxiety). 30 tablet 0    [DISCONTINUED] cetirizine (ZYRTEC) 10 MG tablet Take 1 tablet (10 mg total) by mouth once daily. 30 tablet 2    [DISCONTINUED] erythromycin (ROMYCIN) ophthalmic ointment Place into the right eye 3 (three) times daily. 3.5 g 0    [DISCONTINUED] polyethylene glycol (GLYCOLAX) 17 gram/dose powder Take 17 g by mouth once daily. 119 g 0     No current facility-administered medications on file prior to visit.        Review of Systems:  Review of Systems   Constitutional: Negative for activity change.   HENT: Negative for hearing loss and trouble swallowing.    Eyes: Negative for discharge.    Respiratory: Positive for cough. Negative for chest tightness and wheezing.    Cardiovascular: Negative for chest pain and palpitations.   Gastrointestinal: Positive for vomiting. Negative for constipation and diarrhea.   Genitourinary: Negative for difficulty urinating and hematuria.   Neurological: Positive for headaches.   Psychiatric/Behavioral: Positive for dysphoric mood.       Past Medical History:  Past Medical History:   Diagnosis Date    Anxiety        Objective:    Vitals:  Vitals:    08/14/20 1323   Resp: 16   Temp: 98 °F (36.7 °C)   Weight: 64.4 kg (142 lb)       Physical Exam  Constitutional:       General: She is not in acute distress.     Appearance: She is well-developed.   HENT:      Head: Normocephalic and atraumatic.   Pulmonary:      Effort: Pulmonary effort is normal. No respiratory distress.      Comments: no coughing, breathing comfortably and talking in complete sentences  Neurological:      Mental Status: She is alert and oriented to person, place, and time.   Psychiatric:         Behavior: Behavior normal.         Thought Content: Thought content normal.         Judgment: Judgment normal.         Data:  COVID-19 positive on 8/11    Nadege Quiroz MD  Internal Medicine

## 2020-08-20 ENCOUNTER — NURSE TRIAGE (OUTPATIENT)
Dept: ADMINISTRATIVE | Facility: CLINIC | Age: 55
End: 2020-08-20

## 2020-08-20 NOTE — TELEPHONE ENCOUNTER
Reason for Disposition   [1] COVID-19 diagnosed by positive lab test AND [2] mild symptoms (e.g., cough, fever, others) AND [3] no complications or SOB    Additional Information   Negative: SEVERE or constant chest pain or pressure (Exception: mild central chest pain, present only when coughing)   Negative: MODERATE difficulty breathing (e.g., speaks in phrases, SOB even at rest, pulse 100-120)   Negative: MILD difficulty breathing (e.g., minimal/no SOB at rest, SOB with walking, pulse <100)   Negative: Chest pain   Negative: Patient sounds very sick or weak to the triager   Negative: Fever > 103 F (39.4 C)   Negative: [1] Fever > 101 F (38.3 C) AND [2] age > 60   Negative: [1] Fever > 100.0 F (37.8 C) AND [2] bedridden (e.g., nursing home patient, CVA, chronic illness, recovering from surgery)   Negative: HIGH RISK patient (e.g., age > 64 years, diabetes, heart or lung disease, weak immune system)   Negative: [1] COVID-19 infection suspected by caller or triager AND [2] mild symptoms (cough, fever, or others) AND [3] no complications or SOB   Negative: Fever present > 3 days (72 hours)   Negative: [1] Fever returns after gone for over 24 hours AND [2] symptoms worse or not improved   Negative: [1] Continuous (nonstop) coughing interferes with work or school AND [2] no improvement using cough treatment per protocol   Negative: Cough present > 3 weeks    Protocols used: CORONAVIRUS (COVID-19) DIAGNOSED OR [id number]

## 2020-08-20 NOTE — TELEPHONE ENCOUNTER
Covid-19 symptom tracker call back, patient reports no new or worsening symptoms. Had general questions about the virus. C/o continued mild cough, sinus congestion, fatigue and loss of smell. Denies fever, SOB, and chest pain/pressure. Care advice given per protocol to continue home care. Reviewed OTC medications and general care for symptom relief.    Additional Information   Negative: SEVERE difficulty breathing (e.g., struggling for each breath, speaks in single words)   Negative: Difficult to awaken or acting confused (e.g., disoriented, slurred speech)   Negative: Bluish (or gray) lips or face now   Negative: Shock suspected (e.g., cold/pale/clammy skin, too weak to stand, low BP, rapid pulse)   Negative: Sounds like a life-threatening emergency to the triager   Negative: [1] COVID-19 exposure AND [2] NO symptoms   Negative: COVID-19 and Breastfeeding, questions about   Negative: [1] Adult with possible COVID-19 symptoms AND [2] triager concerned about severity of symptoms or other causes    Protocols used: CORONAVIRUS (COVID-19) DIAGNOSED OR [id number]

## 2020-08-23 ENCOUNTER — NURSE TRIAGE (OUTPATIENT)
Dept: ADMINISTRATIVE | Facility: CLINIC | Age: 55
End: 2020-08-23

## 2020-08-23 NOTE — TELEPHONE ENCOUNTER
Spoke with patient on behalf of Covid Home Symptom Monitoring. Pt has a cough but denies any other symptoms and only called asking questions about OTC med for cough and did not required triage. Care advice given per Information only protocol. Pt instructed to call back if symptoms worsen. Pt understands and agrees with all instructions.  Reason for Disposition   Health Information question, no triage required and triager able to answer question    Additional Information   Negative: [1] Caller is not with the adult (patient) AND [2] reporting urgent symptoms   Negative: Lab result questions   Negative: Caller can't be reached by phone   Negative: Caller has already spoken to PCP or another triager   Negative: RN needs further essential information from caller in order to complete triage   Negative: Requesting regular office appointment   Negative: [1] Caller requesting NON-URGENT health information AND [2] PCP's office is the best resource   Negative: Medication questions    Protocols used: INFORMATION ONLY CALL - NO TRIAGE-A-

## 2020-08-29 ENCOUNTER — NURSE TRIAGE (OUTPATIENT)
Dept: ADMINISTRATIVE | Facility: CLINIC | Age: 55
End: 2020-08-29

## 2020-08-29 ENCOUNTER — HOSPITAL ENCOUNTER (EMERGENCY)
Facility: HOSPITAL | Age: 55
Discharge: HOME | End: 2020-08-30
Attending: EMERGENCY MEDICINE
Payer: OTHER GOVERNMENT

## 2020-08-29 VITALS
SYSTOLIC BLOOD PRESSURE: 133 MMHG | HEART RATE: 75 BPM | RESPIRATION RATE: 18 BRPM | HEIGHT: 61 IN | WEIGHT: 130 LBS | OXYGEN SATURATION: 96 % | DIASTOLIC BLOOD PRESSURE: 61 MMHG | TEMPERATURE: 99 F | BODY MASS INDEX: 24.55 KG/M2

## 2020-08-29 DIAGNOSIS — K75.9 HEPATITIS: ICD-10-CM

## 2020-08-29 DIAGNOSIS — U07.1 COVID-19 VIRUS INFECTION: ICD-10-CM

## 2020-08-29 DIAGNOSIS — R11.0 NAUSEA: Primary | ICD-10-CM

## 2020-08-29 LAB
ALBUMIN SERPL BCP-MCNC: 3.7 G/DL (ref 3.5–5.2)
ALP SERPL-CCNC: 94 U/L (ref 55–135)
ALT SERPL W/O P-5'-P-CCNC: 218 U/L (ref 10–44)
ANION GAP SERPL CALC-SCNC: 11 MMOL/L (ref 8–16)
AST SERPL-CCNC: 116 U/L (ref 10–40)
BACTERIA #/AREA URNS HPF: ABNORMAL /HPF
BASOPHILS # BLD AUTO: 0.02 K/UL (ref 0–0.2)
BASOPHILS NFR BLD: 0.3 % (ref 0–1.9)
BILIRUB SERPL-MCNC: 0.4 MG/DL (ref 0.1–1)
BILIRUB UR QL STRIP: NEGATIVE
BUN SERPL-MCNC: 9 MG/DL (ref 6–20)
CALCIUM SERPL-MCNC: 8.6 MG/DL (ref 8.7–10.5)
CHLORIDE SERPL-SCNC: 98 MMOL/L (ref 95–110)
CLARITY UR: CLEAR
CO2 SERPL-SCNC: 25 MMOL/L (ref 23–29)
COLOR UR: YELLOW
CREAT SERPL-MCNC: 0.8 MG/DL (ref 0.5–1.4)
DIFFERENTIAL METHOD: NORMAL
EOSINOPHIL # BLD AUTO: 0.1 K/UL (ref 0–0.5)
EOSINOPHIL NFR BLD: 1 % (ref 0–8)
ERYTHROCYTE [DISTWIDTH] IN BLOOD BY AUTOMATED COUNT: 12.1 % (ref 11.5–14.5)
EST. GFR  (AFRICAN AMERICAN): >60 ML/MIN/1.73 M^2
EST. GFR  (NON AFRICAN AMERICAN): >60 ML/MIN/1.73 M^2
GLUCOSE SERPL-MCNC: 122 MG/DL (ref 70–110)
GLUCOSE UR QL STRIP: NEGATIVE
HCT VFR BLD AUTO: 39.7 % (ref 37–48.5)
HGB BLD-MCNC: 12.8 G/DL (ref 12–16)
HGB UR QL STRIP: NEGATIVE
IMM GRANULOCYTES # BLD AUTO: 0.01 K/UL (ref 0–0.04)
IMM GRANULOCYTES NFR BLD AUTO: 0.2 % (ref 0–0.5)
KETONES UR QL STRIP: NEGATIVE
LEUKOCYTE ESTERASE UR QL STRIP: ABNORMAL
LYMPHOCYTES # BLD AUTO: 1.1 K/UL (ref 1–4.8)
LYMPHOCYTES NFR BLD: 18.4 % (ref 18–48)
MCH RBC QN AUTO: 29.6 PG (ref 27–31)
MCHC RBC AUTO-ENTMCNC: 32.2 G/DL (ref 32–36)
MCV RBC AUTO: 92 FL (ref 82–98)
MICROSCOPIC COMMENT: ABNORMAL
MONOCYTES # BLD AUTO: 0.5 K/UL (ref 0.3–1)
MONOCYTES NFR BLD: 7.6 % (ref 4–15)
NEUTROPHILS # BLD AUTO: 4.5 K/UL (ref 1.8–7.7)
NEUTROPHILS NFR BLD: 72.5 % (ref 38–73)
NITRITE UR QL STRIP: NEGATIVE
NRBC BLD-RTO: 0 /100 WBC
PH UR STRIP: 8 [PH] (ref 5–8)
PLATELET # BLD AUTO: 220 K/UL (ref 150–350)
PMV BLD AUTO: 10 FL (ref 9.2–12.9)
POTASSIUM SERPL-SCNC: 3.9 MMOL/L (ref 3.5–5.1)
PROT SERPL-MCNC: 6.5 G/DL (ref 6–8.4)
PROT UR QL STRIP: NEGATIVE
RBC # BLD AUTO: 4.32 M/UL (ref 4–5.4)
RBC #/AREA URNS HPF: 2 /HPF (ref 0–4)
SODIUM SERPL-SCNC: 134 MMOL/L (ref 136–145)
SP GR UR STRIP: 1.02 (ref 1–1.03)
SQUAMOUS #/AREA URNS HPF: 7 /HPF
URN SPEC COLLECT METH UR: ABNORMAL
UROBILINOGEN UR STRIP-ACNC: NEGATIVE EU/DL
WBC # BLD AUTO: 6.19 K/UL (ref 3.9–12.7)
WBC #/AREA URNS HPF: 6 /HPF (ref 0–5)

## 2020-08-29 PROCEDURE — 80053 COMPREHEN METABOLIC PANEL: CPT

## 2020-08-29 PROCEDURE — 36415 COLL VENOUS BLD VENIPUNCTURE: CPT

## 2020-08-29 PROCEDURE — 99284 EMERGENCY DEPT VISIT MOD MDM: CPT

## 2020-08-29 PROCEDURE — 25000003 PHARM REV CODE 250: Performed by: EMERGENCY MEDICINE

## 2020-08-29 PROCEDURE — 85025 COMPLETE CBC W/AUTO DIFF WBC: CPT

## 2020-08-29 PROCEDURE — 81000 URINALYSIS NONAUTO W/SCOPE: CPT

## 2020-08-29 RX ORDER — LEVOTHYROXINE SODIUM 75 UG/1
75 TABLET ORAL
COMMUNITY
Start: 2020-06-22

## 2020-08-29 RX ORDER — ALPRAZOLAM 0.5 MG/1
TABLET ORAL
COMMUNITY
Start: 2020-07-14 | End: 2021-05-05 | Stop reason: SDUPTHER

## 2020-08-29 RX ORDER — ONDANSETRON 4 MG/1
4 TABLET, ORALLY DISINTEGRATING ORAL
Status: COMPLETED | OUTPATIENT
Start: 2020-08-29 | End: 2020-08-29

## 2020-08-29 RX ADMIN — ONDANSETRON 4 MG: 4 TABLET, ORALLY DISINTEGRATING ORAL at 11:08

## 2020-08-30 ENCOUNTER — NURSE TRIAGE (OUTPATIENT)
Dept: ADMINISTRATIVE | Facility: CLINIC | Age: 55
End: 2020-08-30

## 2020-08-30 RX ORDER — ONDANSETRON 4 MG/1
4 TABLET, ORALLY DISINTEGRATING ORAL EVERY 6 HOURS PRN
Qty: 12 TABLET | Refills: 0 | Status: SHIPPED | OUTPATIENT
Start: 2020-08-30 | End: 2021-05-05 | Stop reason: ALTCHOICE

## 2020-08-30 NOTE — TELEPHONE ENCOUNTER
"First contact  Contacted patient on behalf of [facility]'s Covid Surveillance Program enrollment and orientation process.  Patient wishes to participate.  Welcome call completed.  Patient has My chart portal and able to sign consent.   Patient will  pulse ox at [facility] at 8 am tomorrow morning. Patient stated she has been having a dry sporadic cough, feels tired, nauseated and "chills"  Denied SOB or fever.  Patient has OOC number for non-emergent needs and 911 for emergencies     Called patient to review COVID-19 Surveillance Program enrollment process.    Smartphone: Yes    [facility]: Yes    Program consent: Yes    Pulse oximeter status: Pending    Verified emergency contacts: Verified    Program Overview: Reviewed , no response process, and importance of correct emergency contacts in event that well-being check is warranted. Encouraged patient to call [phone] 24/7 to speak with an OnCall nurse, if needed.    Sp02: pending    Pulse: pending  Temperature: 98.5    SOB at rest (0-5): 0    SOB with activity (0-5): 0  Cough: yes    Is cough worsening?:no    Fever symptoms:no    Increased home oxygen?:.no  "

## 2020-08-30 NOTE — ED PROVIDER NOTES
Encounter Date: 2020       History     Chief Complaint   Patient presents with    COVID-19 Concerns     with nausea, chills and upset stomach     HPI   Patient is a 55-year-old woman presents emergency department complaining of nausea, chills, upset stomach, into loose bowel movements today.  She was recently diagnosed with COVID-19 and was feeling better but then began to feel ill today.  She has been tolerating oral electrolyte solution at home today but has had decrease oral intake.  Denies any other symptoms at this time other than fatigue.  Review of patient's allergies indicates:  No Known Allergies  Past Medical History:   Diagnosis Date    Anxiety      Past Surgical History:   Procedure Laterality Date    breast augmentation  10 years ago     SECTION      hemmorhiodectomy      tummy tuck      10 years ago     Family History   Problem Relation Age of Onset    Diabetes Mother     COPD Mother     Anxiety disorder Son     Colon cancer Paternal Grandfather      Social History     Tobacco Use    Smoking status: Never Smoker    Smokeless tobacco: Never Used   Substance Use Topics    Alcohol use: No    Drug use: No     Review of Systems   Constitutional: Positive for chills and fatigue. Negative for fever.   HENT: Negative for sore throat.    Respiratory: Negative for shortness of breath.    Cardiovascular: Negative for chest pain.   Gastrointestinal: Positive for diarrhea and nausea.   Genitourinary: Negative for dysuria.   Musculoskeletal: Negative for back pain.   Skin: Negative for rash.   Neurological: Negative for weakness.   Hematological: Does not bruise/bleed easily.       Physical Exam     Initial Vitals [20 2227]   BP Pulse Resp Temp SpO2   (!) 140/71 98 18 98.5 °F (36.9 °C) 98 %      MAP       --         Physical Exam    Constitutional: She appears well-developed and well-nourished. She is not diaphoretic. No distress.   HENT:   Head: Normocephalic and atraumatic.    Eyes: EOM are normal. Pupils are equal, round, and reactive to light.   Neck: Normal range of motion. Neck supple.   Cardiovascular: Normal rate, regular rhythm, normal heart sounds and intact distal pulses. Exam reveals no gallop and no friction rub.    No murmur heard.  Pulmonary/Chest: Breath sounds normal. No respiratory distress. She has no wheezes. She has no rhonchi. She has no rales. She exhibits no tenderness.   Abdominal: Soft. Bowel sounds are normal. She exhibits no distension and no mass. There is no abdominal tenderness. There is no rebound and no guarding.   Musculoskeletal: Normal range of motion. No tenderness or edema.   Neurological: She is alert and oriented to person, place, and time.   Skin: Skin is warm and dry. No rash noted. No erythema.         ED Course   Procedures  Labs Reviewed   CBC W/ AUTO DIFFERENTIAL   COMPREHENSIVE METABOLIC PANEL   URINALYSIS, REFLEX TO URINE CULTURE          Imaging Results    None          Medical Decision Making:   History:   Old Medical Records: I decided to obtain old medical records.  Initial Assessment:   55-year-old woman with COVID-19 infection presents emergency department for evaluation of nausea.  Laboratory evaluation reveals mild transaminitis likely secondary to COVID.  Patient does not drink alcohol.  She is given Zofran with significant improvement in her symptoms.  I will prescribe Zofran for home.  This is likely an effect of the COVID infection.  No evidence of dehydration.  She has no abdominal exam tenderness.  Patient is appropriate for outpatient follow-up by her PCP will be referred for COVID monitoring program.  Return precautions discussed.  Discharged improved in no acute distress.                                 Clinical Impression:       ICD-10-CM ICD-9-CM   1. Nausea  R11.0 787.02   2. COVID-19 virus infection  U07.1    3. Hepatitis  K75.9 573.3                                Guillaume Ontiveros MD  08/30/20 0656

## 2020-08-30 NOTE — TELEPHONE ENCOUNTER
Reason for Disposition   Body temperature < 95 F (35 C) rectally or < 94 F (34.4 C) orally    Additional Information   Negative: Unconscious   Negative: Slurred speech   Negative: Confused thinking   Negative: Stumbling or falling   Negative: Sounds like a life-threatening emergency to the triager   Negative: Frostbite (without signs of hypothermia)   Negative: [1] Severe shivering AND [2] lasts > 30 minutes with rewarming and drying    Protocols used: COLD EXPOSURE (HYPOTHERMIA)-[facility]  Pt states tested covid 19 positive recently(8/11). Pt states she was improving. Pt admits to Stuggling with weird head feeling and sinus issues for a month. Pt states pretty good yest. today went to a party today. pts states she drove 90 mins to party. Felt urge to pass BM on the way home. Passed large BM not diarrhea. Didnt eat good today. didnt take meds today. drank emergency C/electrolyte soluntion and two sinus tyneol pills. after started with chills, Vx1 small after attempt to make herself vomit, tried toast and water cannot shake the nausea,chills now. Pacing the floor. T94. Tend to have allergies. Copious clear nasal discharge. No O2 sat to check. pt felt she was improved.  using Flonase. Denies pain. Temp rechecked at T93. rec ED. Pt agrees. Call back with questions.

## 2020-08-30 NOTE — TELEPHONE ENCOUNTER
Reason for Disposition   [1] COVID-19 infection diagnosed or suspected AND [2] mild symptoms (fever, cough) AND [3] no trouble breathing or other complications   COVID-19 Home Isolation, questions about   COVID-19 Prevention and Healthy Living, questions about    Additional Information   Negative: Severe difficulty breathing (e.g., struggling for each breath, speaks in single words)   Negative: Difficult to awaken or acting confused (e.g., disoriented, slurred speech)   Negative: Bluish (or gray) lips or face now   Negative: Shock suspected (e.g., cold/pale/clammy skin, too weak to stand, low BP, rapid pulse)   Negative: Sounds like a life-threatening emergency to the triager   Negative: [1] COVID-19 suspected (e.g., cough, fever, shortness of breath) AND [2] mild symptoms AND [3] public health department recommends testing   Negative: [1] COVID-19 exposure AND [2] no symptoms   Negative: COVID-19 and Breastfeeding, questions about   Negative: SEVERE or constant chest pain (Exception: mild central chest pain, present only when coughing)   Negative: MODERATE difficulty breathing (e.g., speaks in phrases, SOB even at rest, pulse 100-120)   Negative: Patient sounds very sick or weak to the triager   Negative: MILD difficulty breathing (e.g., minimal/no SOB at rest, SOB with walking, pulse <100)   Negative: Chest pain   Negative: Fever > 103 F (39.4 C)   Negative: [1] Fever > 101 F (38.3 C) AND [2] age > 60   Negative: [1] Fever > 100.0 F (37.8 C) AND [2] bedridden (e.g., nursing home patient, CVA, chronic illness, recovering from surgery)   Negative: HIGH RISK patient (e.g., age > 64 years, diabetes, heart or lung disease, weak immune system)   Negative: Fever present > 3 days (72 hours)   Negative: [1] Fever returns after gone for over 24 hours AND [2] symptoms worse or not improved   Negative: [1] Continuous (nonstop) coughing interferes with work or school AND [2] no improvement using cough  treatment per protocol   Negative: Cough present > 3 weeks    Protocols used: CORONAVIRUS (COVID-19) - DIAGNOSED OR [id number]

## 2020-08-30 NOTE — ED TRIAGE NOTES
Leobardo ADITYA Rueda is here with nausea, chills and upset stomach that started after taking some EmergencyC and vitamins.

## 2020-08-31 ENCOUNTER — OFFICE VISIT (OUTPATIENT)
Dept: FAMILY MEDICINE | Facility: CLINIC | Age: 55
End: 2020-08-31

## 2020-08-31 ENCOUNTER — NURSE TRIAGE (OUTPATIENT)
Dept: ADMINISTRATIVE | Facility: CLINIC | Age: 55
End: 2020-08-31

## 2020-08-31 VITALS — RESPIRATION RATE: 18 BRPM

## 2020-08-31 DIAGNOSIS — Z00.00 PREVENTATIVE HEALTH CARE: ICD-10-CM

## 2020-08-31 DIAGNOSIS — F41.9 ANXIETY: ICD-10-CM

## 2020-08-31 DIAGNOSIS — R79.89 ELEVATED LFTS: Primary | ICD-10-CM

## 2020-08-31 PROCEDURE — 99213 OFFICE O/P EST LOW 20 MIN: CPT | Mod: 95,,, | Performed by: INTERNAL MEDICINE

## 2020-08-31 PROCEDURE — 99213 PR OFFICE/OUTPT VISIT, EST, LEVL III, 20-29 MIN: ICD-10-PCS | Mod: 95,,, | Performed by: INTERNAL MEDICINE

## 2020-08-31 RX ORDER — BUSPIRONE HYDROCHLORIDE 5 MG/1
5 TABLET ORAL 2 TIMES DAILY PRN
Qty: 60 TABLET | Refills: 2 | Status: SHIPPED | OUTPATIENT
Start: 2020-08-31 | End: 2021-05-05 | Stop reason: DRUGHIGH

## 2020-08-31 NOTE — TELEPHONE ENCOUNTER
Pt called and [facility] Pt aso has  Virtual appt with MD this afternoon at 3pm. Pt said has completed enrollment and signed the consent. Will complete to do list once she gets machine   Reason for Disposition   Health Information question, no triage required and triager able to answer question    Protocols used: INFORMATION ONLY CALL - NO TRIAGE-A-

## 2020-08-31 NOTE — PROGRESS NOTES
Assessment and Plan:    1. Elevated LFTs  Will repeat LFTs in 1 month, suspect elevated 2/2 COVID. No longer taking acetaminophen. Does not drink at all.   - Hepatic function panel; Future    2. Anxiety  - busPIRone (BUSPAR) 5 MG Tab; Take 1 tablet (5 mg total) by mouth 2 (two) times daily as needed.  Dispense: 60 tablet; Refill: 2    3. Preventative health care  - Lipid Panel; Future  - HIV 1/2 Ag/Ab (4th Gen); Future    ______________________________________________________________________  Subjective:    Chief Complaint:  ER follow up    HPI:  Leobardo is a 55 y.o. year old female with telemedicine visit today as consultation for ER follow up    The patient location is: Home  The chief complaint leading to consultation is: as above  Visit type: Virtual visit with synchronous audio and video  Total time spent with patient: 20 minutes  Each patient to whom he or she provides medical services by telemedicine is:  (1) informed of the relationship between the physician and patient and the respective role of any other health care provider with respect to management of the patient; and (2) notified that he or she may decline to receive medical services by telemedicine and may withdraw from such care at any time.      In ER on 8/29 for nausea, chills, and diarrhea. Has known COVID 19 diagnosed initially 8/11/20. BP elevated 140/71. Noted to have mild transaminitis on ER labs.     She reports that she had been taking tylenol for over a month, but since being in the ER she has stopped taking this.     Medications:  Current Outpatient Medications on File Prior to Visit   Medication Sig Dispense Refill    ALPRAZolam (XANAX) 0.5 MG tablet TK 1 T PO Q 8 H PRA      fluticasone propionate (FLONASE) 50 mcg/actuation nasal spray 2 sprays (100 mcg total) by Each Nostril route 2 (two) times daily. 16 g 1    levothyroxine (SYNTHROID) 50 MCG tablet TAKE 1 TABLET(50 MCG) BY MOUTH EVERY DAY 30 tablet 2    ondansetron (ZOFRAN-ODT) 4 MG  TbDL Take 1 tablet (4 mg total) by mouth every 6 (six) hours as needed. 12 tablet 0    pantoprazole (PROTONIX) 40 MG tablet Take 1 tablet (40 mg total) by mouth once daily. 30 tablet 2    pulse oximeter (PULSE OXIMETER) device by Apply Externally route 2 (two) times a day. Use twice daily at 8 AM and 3 PM and record the value in MyChart as directed. 1 each 0    SYNTHROID 75 mcg tablet        No current facility-administered medications on file prior to visit.        Review of Systems:  Review of Systems   Constitutional: Negative for activity change and unexpected weight change.   HENT: Positive for rhinorrhea. Negative for hearing loss and trouble swallowing.    Eyes: Negative for discharge and visual disturbance.   Respiratory: Positive for chest tightness. Negative for wheezing.    Cardiovascular: Positive for palpitations. Negative for chest pain.   Gastrointestinal: Positive for vomiting. Negative for blood in stool, constipation and diarrhea.   Endocrine: Negative for polydipsia and polyuria.   Genitourinary: Negative for difficulty urinating, dysuria, hematuria and menstrual problem.   Musculoskeletal: Negative for arthralgias, joint swelling and neck pain.   Neurological: Positive for weakness and headaches.   Psychiatric/Behavioral: Negative for dysphoric mood.     Entered by patient and reviewed and updated during visit      Past Medical History:  Past Medical History:   Diagnosis Date    Anxiety        Objective:    Vitals:  Vitals:    08/31/20 1525   Resp: 18       Physical Exam  Constitutional:       General: She is not in acute distress.     Appearance: She is well-developed.   HENT:      Head: Normocephalic and atraumatic.   Pulmonary:      Effort: Pulmonary effort is normal. No respiratory distress.   Neurological:      Mental Status: She is alert and oriented to person, place, and time.   Psychiatric:         Behavior: Behavior normal.         Thought Content: Thought content normal.          Judgment: Judgment normal.         Data:  Previous labs reviewed and pertinent for elevated LFTs.      Nadege Quiroz MD  Internal Medicine

## 2020-08-31 NOTE — TELEPHONE ENCOUNTER
Pt still in enrollment process and waiting on pulse ox.     Reason for Disposition   Health Information question, no triage required and triager able to answer question    Protocols used: INFORMATION ONLY CALL - NO TRIAGE-A-

## 2020-08-31 NOTE — TELEPHONE ENCOUNTER
Patient escalated due to abnormal vital sign and/or symptom; however, patient did not answer the follow up nurse phone call. Per protocol, each number in chart was tried one time, voicemail left instructing patient to call back. See follow up [employer] message that was sent to patient.    Additional Information   Negative: Caller has already spoken with the PCP (or office), and has no further questions   Negative: Caller has already spoken with another triager and has no further questions   Negative: Caller has already spoken with another triager or PCP (or office), and has further questions and triager able to answer questions.   Negative: Busy signal.  First attempt to contact caller.  Follow-up call scheduled within 15 minutes.   Negative: No answer.  First attempt to contact caller.  Follow-up call scheduled within 15 minutes.   Negative: Message left on identified voicemail   Negative: Message left on unidentified voice mail. Phone number verified.   Negative: Message left with person in household   Negative: Wrong number reached. Phone number verified.   Negative: Second attempt to contact family AND no contact made. Phone number verified.   Negative: Cell phone out of range. Phone number verified.   Negative: Patient already left for the hospital/clinic   Negative: Caller has cancelled the call before the first contact   Negative: Unable to complete triage due to phone connection issues    Protocols used: NO CONTACT OR DUPLICATE CONTACT CALL-A-OH

## 2020-08-31 NOTE — TELEPHONE ENCOUNTER
See previous note  Reason for Disposition   [1] COVID-19 diagnosed by positive lab test AND [2] mild symptoms (e.g., cough, fever, others) AND [3] no complications or SOB    Additional Information   Negative: Severe difficulty breathing (e.g., struggling for each breath, speaks in single words)   Negative: Difficult to awaken or acting confused (e.g., disoriented, slurred speech)   Negative: Bluish (or gray) lips or face now   Negative: Shock suspected (e.g., cold/pale/clammy skin, too weak to stand, low BP, rapid pulse)   Negative: Sounds like a life-threatening emergency to the triager   Negative: [1] COVID-19 exposure AND [2] no symptoms   Negative: COVID-19 and Breastfeeding, questions about   Negative: [1] Adult with possible COVID-19 symptoms AND [2] triager concerned about severity of symptoms or other causes   Negative: SEVERE or constant chest pain or pressure (Exception: mild central chest pain, present only when coughing)   Negative: MODERATE difficulty breathing (e.g., speaks in phrases, SOB even at rest, pulse 100-120)   Negative: MILD difficulty breathing (e.g., minimal/no SOB at rest, SOB with walking, pulse <100)   Negative: Chest pain   Negative: Patient sounds very sick or weak to the triager   Negative: Fever > 103 F (39.4 C)   Negative: [1] Fever > 101 F (38.3 C) AND [2] age > 60   Negative: [1] Fever > 100.0 F (37.8 C) AND [2] bedridden (e.g., nursing home patient, CVA, chronic illness, recovering from surgery)   Negative: HIGH RISK patient (e.g., age > 64 years, diabetes, heart or lung disease, weak immune system) (Exception: has already been evaluated by healthcare provider and has no new or worsening symptoms)   Negative: [1] COVID-19 infection suspected by caller or triager AND [2] mild symptoms (cough, fever, or others) AND [3] no complications or SOB   Negative: Fever present > 3 days (72 hours)   Negative: [1] Fever returns after gone for over 24 hours AND [2]  symptoms worse or not improved   Negative: [1] Continuous (nonstop) coughing interferes with work or school AND [2] no improvement using cough treatment per protocol   Negative: Cough present > 3 weeks    Protocols used: CORONAVIRUS (COVID-19) DIAGNOSED OR [id number]

## 2020-09-01 ENCOUNTER — NURSE TRIAGE (OUTPATIENT)
Dept: ADMINISTRATIVE | Facility: CLINIC | Age: 55
End: 2020-09-01

## 2020-09-01 NOTE — TELEPHONE ENCOUNTER
Contacted patient on behalf of [facility]'s Covid Surveillance Program enrollment and orientation process.  Patient was able to get pulse ox from pharmacy yesterday but she has not opened box yet.  Enrollment completed.  Patient stated she has been having problems with anxiety and she was up at 2 am and didn't fall back asleep until 6 am.  Patient stated she takes alprazolam and she had a visit with PCP yesterday and she will start on Buspar.  Patient stated her only symptom currently with Covid is sporadic cough.  Patient stated she will upload VS this am as soon as she checks pulse ox.  Patient has [facility] number for non-emergent matters and 911 for emergencies    Sp02:    Pulse: pending    Temperature: No thermometer    SOB at rest (0-5): 0    SOB with activity (0-5): 0    Cough:yes    Is cough worsening?: no    Fever symptoms: no    Increased home oxygen?: no    Called patient to review COVID-19 Surveillance Program enrollment process.    Smartphone: Yes    [facility]: Yes    Program consent: Yes  Pulse oximeter status: Yes     Verified emergency contacts: YES    Program Overview: Reviewed , no response process, and importance of correct emergency contacts in event that well-being check is warranted. Encouraged patient to call [phone] 24/7 to speak with an OnCall nurse, if needed.    Patient had no further questions.

## 2020-09-01 NOTE — TELEPHONE ENCOUNTER
Reason for Disposition   [1] COVID-19 infection diagnosed or suspected AND [2] mild symptoms (fever, cough) AND [3] no trouble breathing or other complications   COVID-19 Home Isolation, questions about   COVID-19 Prevention and Healthy Living, questions about    Additional Information   Negative: Severe difficulty breathing (e.g., struggling for each breath, speaks in single words)   Negative: Difficult to awaken or acting confused (e.g., disoriented, slurred speech)   Negative: Bluish (or gray) lips or face now   Negative: Shock suspected (e.g., cold/pale/clammy skin, too weak to stand, low BP, rapid pulse)   Negative: Sounds like a life-threatening emergency to the triager   Negative: [1] COVID-19 suspected (e.g., cough, fever, shortness of breath) AND [2] mild symptoms AND [3] public health department recommends testing   Negative: [1] COVID-19 exposure AND [2] no symptoms   Negative: COVID-19 and Breastfeeding, questions about   Negative: SEVERE or constant chest pain (Exception: mild central chest pain, present only when coughing)   Negative: MODERATE difficulty breathing (e.g., speaks in phrases, SOB even at rest, pulse 100-120)   Negative: Patient sounds very sick or weak to the triager   Negative: MILD difficulty breathing (e.g., minimal/no SOB at rest, SOB with walking, pulse <100)   Negative: Chest pain   Negative: Fever > 103 F (39.4 C)   Negative: [1] Fever > 101 F (38.3 C) AND [2] age > 60   Negative: [1] Fever > 100.0 F (37.8 C) AND [2] bedridden (e.g., nursing home patient, CVA, chronic illness, recovering from surgery)   Negative: HIGH RISK patient (e.g., age > 64 years, diabetes, heart or lung disease, weak immune system)   Negative: Fever present > 3 days (72 hours)   Negative: [1] Fever returns after gone for over 24 hours AND [2] symptoms worse or not improved   Negative: [1] Continuous (nonstop) coughing interferes with work or school AND [2] no improvement using cough  treatment per protocol   Negative: Cough present > 3 weeks    Protocols used: CORONAVIRUS (COVID-19) - DIAGNOSED OR [id number]

## 2020-09-04 ENCOUNTER — NURSE TRIAGE (OUTPATIENT)
Dept: ADMINISTRATIVE | Facility: CLINIC | Age: 55
End: 2020-09-04

## 2020-09-04 DIAGNOSIS — Z12.11 COLON CANCER SCREENING: ICD-10-CM

## 2020-09-04 DIAGNOSIS — Z12.39 BREAST CANCER SCREENING: ICD-10-CM

## 2020-09-04 NOTE — TELEPHONE ENCOUNTER
Patient initially escalated due to no response, however patient entered vitals prior to phone call. Vital signs and symptoms did not trigger a second escalation; therefore, no follow up call is needed at this time.  Reason for Disposition   Caller has cancelled the call before the first contact    Protocols used: NO CONTACT OR DUPLICATE CONTACT CALL-A-[place]

## 2020-09-05 ENCOUNTER — NURSE TRIAGE (OUTPATIENT)
Dept: ADMINISTRATIVE | Facility: CLINIC | Age: 55
End: 2020-09-05

## 2020-09-06 ENCOUNTER — NURSE TRIAGE (OUTPATIENT)
Dept: ADMINISTRATIVE | Facility: CLINIC | Age: 55
End: 2020-09-06

## 2020-09-06 NOTE — TELEPHONE ENCOUNTER
"1745 1st attempt: call to [phone]; no answer; no contact; LM on VM.    1751 2nd attempt: call to [phone]; no answer; no contact.    Patient escalated due to abnormal vital sign and/or symptom; however, patient did not answer the follow up nurse phone call. Per protocol, each number in chart was tried one time, voicemail left instructing patient to call back. See follow up [employer] message that was sent to patient:    "We are contacting you from [facility] Covid-19 Surveillance Program, as we did not receive your symptoms and vital signs earlier today. We are calling to make sure that you are okay. If you need anything, please call [facility] ([phone]) to speak with one of our nurses. Thank you."      Reason for Disposition   Unable to complete triage due to phone connection issues    Protocols used: NO CONTACT OR DUPLICATE CONTACT CALL-A-AH      "

## 2020-09-08 ENCOUNTER — NURSE TRIAGE (OUTPATIENT)
Dept: ADMINISTRATIVE | Facility: CLINIC | Age: 55
End: 2020-09-08

## 2020-09-08 NOTE — TELEPHONE ENCOUNTER
Patient initially escalated due to no response, however patient entered vitals prior to phone call. Vital signs and symptoms did not trigger a second escalation; therefore, no follow up call is needed at this time.    Reason for Disposition   Caller has cancelled the call before the first contact    Additional Information   Negative: Caller is angry or rude (e.g., hangs up, verbally abusive, yelling)   Negative: Caller hangs up   Negative: Caller has already spoken with the PCP and has no further questions.   Negative: Caller has already spoken with another triager and has no further questions.   Negative: Caller has already spoken with another triager or PCP AND has further questions AND triager able to answer questions.   Negative: Patient already left for the hospital/clinic.   Negative: Pager number given.  Answering service notified.   Negative: Cell phone out of range.  Phone number verified.   Negative: Second attempt to contact family AND no contact made.  Phone number verified.   Negative: Wrong number reached.  Phone number verified.   Negative: Message left with person in household.   Negative: Message left on unidentified voice mail.  Phone number verified.   Negative: Message left on identified voice mail   Negative: No answer.  First attempt to contact caller.  Follow-up call scheduled within 15 minutes.   Negative: Busy signal.  First attempt to contact caller.  Follow-up call scheduled within 15 minutes.    Protocols used: NO CONTACT OR DUPLICATE CONTACT CALL-A-

## 2020-09-08 NOTE — TELEPHONE ENCOUNTER
Patient initially escalated due to no response, however patient entered vitals prior to phone call. Vital signs and symptoms did not trigger a second escalation; therefore, no follow up call is needed at this time.  Reason for Disposition   Caller has cancelled the call before the first contact    Protocols used: NO CONTACT OR DUPLICATE CONTACT CALL-A-AH

## 2020-09-10 ENCOUNTER — NURSE TRIAGE (OUTPATIENT)
Dept: ADMINISTRATIVE | Facility: CLINIC | Age: 55
End: 2020-09-10

## 2020-09-10 NOTE — TELEPHONE ENCOUNTER
Patient initially escalated due to no response, however patient entered vitals prior to phone call. Vital signs and symptoms did not trigger a second escalation; therefore, no follow up call is needed at this time.    Reason for Disposition   Unable to complete triage due to phone connection issues    Additional Information   Negative: Caller has already spoken with the PCP (or office), and has no further questions   Negative: Caller has already spoken with another triager and has no further questions   Negative: Caller has already spoken with another triager or PCP (or office), and has further questions and triager able to answer questions.    Protocols used: NO CONTACT OR DUPLICATE CONTACT CALL-A-OH

## 2020-09-12 ENCOUNTER — NURSE TRIAGE (OUTPATIENT)
Dept: ADMINISTRATIVE | Facility: CLINIC | Age: 55
End: 2020-09-12

## 2020-09-12 NOTE — TELEPHONE ENCOUNTER
Patient escalated due to abnormal vital sign and/or symptom; however, patient did not answer follow up nurse phone call. Per protocol, each number in charted was tried two times and voicemails were left instructing patient to call back or seek emergent care if needed    .Additional Information   Negative: Caller is angry or rude (e.g., hangs up, verbally abusive, yelling)   Negative: Caller hangs up   Negative: Caller has already spoken with the PCP and has no further questions.   Negative: Caller has already spoken with another triager and has no further questions.   Negative: Caller has already spoken with another triager or PCP AND has further questions AND triager able to answer questions.   Negative: Busy signal.  First attempt to contact caller.  Follow-up call scheduled within 15 minutes.   Negative: No answer.  First attempt to contact caller.  Follow-up call scheduled within 15 minutes.   Negative: Message left on identified voice mail   Negative: Message left on unidentified voice mail.  Phone number verified.   Negative: Message left with person in household.   Negative: Wrong number reached.  Phone number verified.   Negative: Second attempt to contact family AND no contact made.  Phone number verified.   Negative: Cell phone out of range.  Phone number verified.   Negative: Pager number given.  Answering service notified.   Negative: Patient already left for the hospital/clinic.   Negative: Caller has cancelled the call before the first contact   Negative: Unable to complete triage due to phone connection issues   Negative: NON-URGENT call redirected to PCP's office because it is open    Protocols used: NO CONTACT OR DUPLICATE CONTACT CALL-A-

## 2020-09-12 NOTE — TELEPHONE ENCOUNTER
Covid Home Surveillance   Vitals O2  97 HR 67  Temp n/a  Fever Symptomns: No  Cough: Yes, not worsening  SOB at rest: 1  SOB with activity: 1    Pt contacted through the Covid Home Surveillance program d/t esc of no response.  Pt entered in her v/s late bc she said she was visiting with her daughter and she forgot.  Pt denies any fever, worsening cough, CP, or difficulty breathing at this time.  Per protocol, no additional triage or action needed at this time.  Pt instructed to call [facility] with any new or worsening symptoms.     Additional Information   Negative: Nursing judgment   Negative: Nursing judgment   Negative: Nursing judgment   Negative: Nursing judgment   Negative: Information only question and nurse able to answer    Protocols used: INFORMATION ONLY CALL - NO TRIAGE-A-OH

## 2020-09-12 NOTE — TELEPHONE ENCOUNTER
Covid Home Surveillance   Vitals O2 94 HR 82 Temp  n/a  Fever Symptomns: No  Cough: Yes, not worsening  SOB at rest: 1  SOB with activity: 1    Pt contacted through the Covid Home Surveillance program d/t esc of no response.  Pt entered her v/s late after a reminder from RN.  Pt denies any fever, worsening cough, CP, or difficulty breathing at this time.  Per protocol, no additional triage or action needed at this time.  Pt instructed to call [facility] with any new or worsening symptoms.      Additional Information   Negative: [1] Caller is not with the adult (patient) AND [2] reporting urgent symptoms   Negative: Lab result questions   Negative: Medication questions   Negative: Caller can't be reached by phone   Negative: Caller has already spoken to PCP or another triager   Negative: RN needs further essential information from caller in order to complete triage   Negative: Requesting regular office appointment   Negative: [1] Caller requesting NON-URGENT health information AND [2] PCP's office is the best resource   Negative: Health Information question, no triage required and triager able to answer question   Negative: General information question, no triage required and triager able to answer question   Negative: Question about upcoming scheduled test, no triage required and triager able to answer question   Negative: [1] Caller is not with the adult (patient) AND [2] probable NON-URGENT symptoms   Negative: [1] Follow-up call to recent contact AND [2] information only call, no triage required    Protocols used: [facility] ONLY [facility] - [facility]

## 2020-09-13 ENCOUNTER — NURSE TRIAGE (OUTPATIENT)
Dept: ADMINISTRATIVE | Facility: CLINIC | Age: 55
End: 2020-09-13

## 2020-09-13 NOTE — TELEPHONE ENCOUNTER
Reason for Disposition   Message left on identified voice mail    Protocols used: NO CONTACT OR DUPLICATE CONTACT CALL-A-AH

## 2020-09-16 ENCOUNTER — PATIENT MESSAGE (OUTPATIENT)
Dept: INTERNAL MEDICINE | Facility: CLINIC | Age: 55
End: 2020-09-16

## 2020-09-28 ENCOUNTER — PATIENT OUTREACH (OUTPATIENT)
Dept: ADMINISTRATIVE | Facility: HOSPITAL | Age: 55
End: 2020-09-28

## 2020-09-28 ENCOUNTER — LAB VISIT (OUTPATIENT)
Dept: LAB | Facility: HOSPITAL | Age: 55
End: 2020-09-28
Attending: INTERNAL MEDICINE

## 2020-09-28 DIAGNOSIS — R79.89 ELEVATED LFTS: ICD-10-CM

## 2020-09-28 DIAGNOSIS — Z00.00 PREVENTATIVE HEALTH CARE: ICD-10-CM

## 2020-09-28 PROCEDURE — 86703 HIV-1/HIV-2 1 RESULT ANTBDY: CPT

## 2020-09-28 PROCEDURE — 80061 LIPID PANEL: CPT

## 2020-09-28 PROCEDURE — 80076 HEPATIC FUNCTION PANEL: CPT

## 2020-09-28 NOTE — PROGRESS NOTES
Non-compliant GAP report chart review - Chart review completed for the following HM test if overdue  (Mammogram, Colonoscopy, Cervical Cancer Screening,  Diabetic lab testing, and/or Dilated EYE EXAM)  09/28/2020    Care Everywhere and Media reports - updates requested and reviewed.        [facility] and [facility] reviewed.  Pap Smear and/or  LABS       DIS reviewed for test needed.  Mammogram        mammo scheduled        Health Maintenance Due   Topic Date Due    HIV Screening  01/12/1979    TETANUS VACCINE  01/12/1982    Cervical Cancer Screening  01/12/1985    Mammogram  01/12/2004    Shingles Vaccine (1 of 2) 01/12/2014    Colorectal Cancer Screening  01/12/2014

## 2020-09-29 LAB
ALBUMIN SERPL BCP-MCNC: 4.1 G/DL (ref 3.5–5.2)
ALP SERPL-CCNC: 56 U/L (ref 55–135)
ALT SERPL W/O P-5'-P-CCNC: 25 U/L (ref 10–44)
AST SERPL-CCNC: 25 U/L (ref 10–40)
BILIRUB DIRECT SERPL-MCNC: 0.3 MG/DL (ref 0.1–0.3)
BILIRUB SERPL-MCNC: 0.7 MG/DL (ref 0.1–1)
CHOLEST SERPL-MCNC: 216 MG/DL (ref 120–199)
CHOLEST/HDLC SERPL: 3.3 {RATIO} (ref 2–5)
HDLC SERPL-MCNC: 66 MG/DL (ref 40–75)
HDLC SERPL: 30.6 % (ref 20–50)
HIV 1+2 AB+HIV1 P24 AG SERPL QL IA: NEGATIVE
LDLC SERPL CALC-MCNC: 125 MG/DL (ref 63–159)
NONHDLC SERPL-MCNC: 150 MG/DL
PROT SERPL-MCNC: 7.1 G/DL (ref 6–8.4)
TRIGL SERPL-MCNC: 125 MG/DL (ref 30–150)

## 2020-10-05 ENCOUNTER — PATIENT MESSAGE (OUTPATIENT)
Dept: ADMINISTRATIVE | Facility: HOSPITAL | Age: 55
End: 2020-10-05

## 2020-10-09 ENCOUNTER — PATIENT OUTREACH (OUTPATIENT)
Dept: ADMINISTRATIVE | Facility: HOSPITAL | Age: 55
End: 2020-10-09

## 2020-10-09 NOTE — LETTER
October 9, 2020    Leobardo Rueda  [address] [place] [address]             [facility]  [address]  [facility] [address]  Phone: [phone] Dear Andrea, [facility] is committed to your overall health and would like to ensure that you are up to date on your recommended test and/or procedures.   Nadege Quiroz MD has found that your chart shows you may be due for the following:     Colon cancer screening;   Mammogram   Pap smear       If you have had any of the above done at another facility, please let us know so that we may obtain copies from that facility.  If you have a copy of these records, please provide a copy for us to scan into your chart.  You are welcome to request that the report be faxed to us at  ([phone]).       If you have an upcoming scheduled appointment for the above test and/or procedures, please disregard this letter.  Otherwise, please contact us by your [facility] portal or by calling [phone] and we will schedule these appointments for you.  We are currently scheduling patients for test and/or procedures needed in June through 2020.       Thank you for letting us care for you,       Betsy Feldman, Care Coordinator   [facility]   Phone: [phone]   Fax: [phone]

## 2020-10-30 ENCOUNTER — PATIENT MESSAGE (OUTPATIENT)
Dept: ADMINISTRATIVE | Facility: HOSPITAL | Age: 55
End: 2020-10-30

## 2020-12-24 ENCOUNTER — PATIENT MESSAGE (OUTPATIENT)
Dept: FAMILY MEDICINE | Facility: CLINIC | Age: 55
End: 2020-12-24

## 2020-12-24 ENCOUNTER — TELEPHONE (OUTPATIENT)
Dept: FAMILY MEDICINE | Facility: CLINIC | Age: 55
End: 2020-12-24

## 2020-12-30 ENCOUNTER — PATIENT OUTREACH (OUTPATIENT)
Dept: ADMINISTRATIVE | Facility: HOSPITAL | Age: 55
End: 2020-12-30

## 2020-12-30 NOTE — PROGRESS NOTES
Non-compliant GAP report chart review - Chart review completed for the following HM test if overdue  (Mammogram)  12/30/2020    Care Everywhere and Media reports - updates requested and reviewed.            DIS reviewed for test needed.  Mammogram  Recently reviewed/outreached

## 2020-12-31 ENCOUNTER — HOSPITAL ENCOUNTER (OUTPATIENT)
Dept: RADIOLOGY | Facility: CLINIC | Age: 55
Discharge: HOME | End: 2020-12-31
Attending: STUDENT IN AN ORGANIZED HEALTH CARE EDUCATION/TRAINING PROGRAM
Payer: MEDICAID

## 2020-12-31 ENCOUNTER — OFFICE VISIT (OUTPATIENT)
Dept: FAMILY MEDICINE | Facility: CLINIC | Age: 55
End: 2020-12-31
Payer: MEDICAID

## 2020-12-31 ENCOUNTER — PATIENT MESSAGE (OUTPATIENT)
Dept: FAMILY MEDICINE | Facility: CLINIC | Age: 55
End: 2020-12-31

## 2020-12-31 VITALS
HEART RATE: 76 BPM | SYSTOLIC BLOOD PRESSURE: 128 MMHG | HEIGHT: 61 IN | DIASTOLIC BLOOD PRESSURE: 68 MMHG | WEIGHT: 144.38 LBS | TEMPERATURE: 97 F | BODY MASS INDEX: 27.26 KG/M2 | OXYGEN SATURATION: 95 %

## 2020-12-31 DIAGNOSIS — M79.671 RIGHT FOOT PAIN: Primary | ICD-10-CM

## 2020-12-31 DIAGNOSIS — M79.671 RIGHT FOOT PAIN: ICD-10-CM

## 2020-12-31 PROCEDURE — 73630 X-RAY EXAM OF FOOT: CPT | Mod: 26,RT,S$GLB, | Performed by: RADIOLOGY

## 2020-12-31 PROCEDURE — 99214 PR OFFICE/OUTPT VISIT, EST, LEVL IV, 30-39 MIN: ICD-10-PCS | Mod: S$PBB,,, | Performed by: STUDENT IN AN ORGANIZED HEALTH CARE EDUCATION/TRAINING PROGRAM

## 2020-12-31 PROCEDURE — 99214 OFFICE O/P EST MOD 30 MIN: CPT | Mod: S$PBB,,, | Performed by: STUDENT IN AN ORGANIZED HEALTH CARE EDUCATION/TRAINING PROGRAM

## 2020-12-31 PROCEDURE — 99999 PR PBB SHADOW E&M-EST. PATIENT-LVL V: CPT | Mod: PBBFAC,,, | Performed by: STUDENT IN AN ORGANIZED HEALTH CARE EDUCATION/TRAINING PROGRAM

## 2020-12-31 PROCEDURE — 99215 OFFICE O/P EST HI 40 MIN: CPT | Mod: PBBFAC,25,PO | Performed by: STUDENT IN AN ORGANIZED HEALTH CARE EDUCATION/TRAINING PROGRAM

## 2020-12-31 PROCEDURE — 73630 XR FOOT COMPLETE 3 VIEW RIGHT: ICD-10-PCS | Mod: 26,RT,S$GLB, | Performed by: RADIOLOGY

## 2020-12-31 PROCEDURE — 73630 X-RAY EXAM OF FOOT: CPT | Mod: TC,FY,PO,RT

## 2020-12-31 PROCEDURE — 99999 PR PBB SHADOW E&M-EST. PATIENT-LVL V: ICD-10-PCS | Mod: PBBFAC,,, | Performed by: STUDENT IN AN ORGANIZED HEALTH CARE EDUCATION/TRAINING PROGRAM

## 2020-12-31 RX ORDER — MELOXICAM 15 MG/1
15 TABLET ORAL DAILY
Qty: 30 TABLET | Refills: 0 | Status: SHIPPED | OUTPATIENT
Start: 2020-12-31 | End: 2021-01-27

## 2020-12-31 NOTE — PROGRESS NOTES
[facility] MEDICINE CLINIC NOTE    Patient Name: Leobardo Rueda  YOB: 1965    PRESENTING HISTORY   Chief Complaint:   Chief Complaint   Patient presents with    Foot Injury     Right      New patient to me  History of Present Illness:  Ms. Leobardo Rueda is a 55 y.o. female with      Board fell on right foot 1 month ago.   Ache, stick burn.   Pain across dorsum and plantar aspect of foot. Worst on lateral aspect.   Unrelated bunion.     Remote left arm fracture as teenager.   Left knee fracture .     Menopausal 2017.     Review of Systems   Musculoskeletal: Positive for joint pain. Negative for falls and myalgias.         PAST HISTORY:     Past Medical History:   Diagnosis Date    Anxiety        Past Surgical History:   Procedure Laterality Date    breast augmentation  10 years ago     SECTION      hemmorhiodectomy      tummy tuck      10 years ago       Family History   Problem Relation Age of Onset    Diabetes Mother     COPD Mother     Anxiety disorder Son     Colon cancer Paternal Grandfather        Social History     Socioeconomic History    Marital status:      Spouse name: Not on file    Number of children: Not on file    Years of education: Not on file    Highest education level: Not on file   Occupational History    Not on file   Social Needs    Financial resource strain: Not on file    Food insecurity     Worry: Not on file     Inability: Not on file    Transportation needs     Medical: Not on file     Non-medical: Not on file   Tobacco Use    Smoking status: Never Smoker    Smokeless tobacco: Never Used   Substance and Sexual Activity    Alcohol use: No    Drug use: No    Sexual activity: Not on file   Lifestyle    Physical activity     Days per week: Not on file     Minutes per session: Not on file    Stress: Not on file   Relationships    Social connections     Talks on phone: Not on file     Gets together: Not on file     Attends  "Jain service: Not on file     Active member of club or organization: Not on file     Attends meetings of clubs or organizations: Not on file     Relationship status: Not on file   Other Topics Concern    Not on file   Social History Narrative    Not on file       MEDICATIONS & ALLERGIES:     Current Outpatient Medications on File Prior to Visit   Medication Sig    ALPRAZolam (XANAX) 0.5 MG tablet TK 1 T PO Q 8 H PRA    busPIRone (BUSPAR) 5 MG Tab Take 1 tablet (5 mg total) by mouth 2 (two) times daily as needed.    fluticasone propionate (FLONASE) 50 mcg/actuation nasal spray 2 sprays (100 mcg total) by Each Nostril route 2 (two) times daily.    levothyroxine (SYNTHROID) 50 MCG tablet TAKE 1 TABLET(50 MCG) BY MOUTH EVERY DAY    ondansetron (ZOFRAN-ODT) 4 MG TbDL Take 1 tablet (4 mg total) by mouth every 6 (six) hours as needed.    pulse oximeter (PULSE OXIMETER) device by Apply Externally route 2 (two) times a day. Use twice daily at 8 AM and 3 PM and record the value in MyChart as directed.    SYNTHROID 75 mcg tablet     pantoprazole (PROTONIX) 40 MG tablet Take 1 tablet (40 mg total) by mouth once daily.     No current facility-administered medications on file prior to visit.        Review of patient's allergies indicates:  No Known Allergies    OBJECTIVE:   Vital Signs:  Vitals:    12/31/20 1208   BP: 128/68   Pulse: 76   Temp: 97.2 °F (36.2 °C)   TempSrc: Temporal   SpO2: 95%   Weight: 65.5 kg (144 lb 6.4 oz)   Height: 5' 1" (1.549 m)       No results found for this or any previous visit (from the past 24 hour(s)).      Physical Exam   Constitutional: She is well-developed, well-nourished, and in no distress.   Cardiovascular: Normal rate, regular rhythm and normal heart sounds.   No murmur heard.  Pulmonary/Chest: Effort normal and breath sounds normal. No respiratory distress. She has no wheezes. She has no rales.   Musculoskeletal: Normal range of motion.         General: No deformity or edema. "      Comments: Bunion to right foot. No pain over navicular, medial or lateral malleolus. No pain over proximal fibula. Marked tenderness to plantar aspect of midfoot.    Skin: She is not diaphoretic.   Nursing note and vitals reviewed.      ASSESSMENT & PLAN:     Right foot pain  -     X-Ray Foot Complete Right; Future; Expected date: 12/31/2020  -     meloxicam (MOBIC) 15 MG tablet; Take 1 tablet (15 mg total) by mouth once daily.  Dispense: 30 tablet; Refill: 0  -     Ambulatory referral/consult to Podiatry; Future; Expected date: 01/07/2021  -     POST-SURGICAL BOOT/SHOE FOR HOME USE    55 F here with remote foot injury. Check xray to evaluate for fx. Pain in midfoot. Will trail immobilization. Refer to podiatry.        Clint Mejía MD

## 2020-12-31 NOTE — PATIENT INSTRUCTIONS
Wear shoe brace whenever ambulating for the next 3 weeks or until seen by Podiatry.       Wearing Proper Shoes                    You walk on your feet every day, forcing them to support the weight of your body. Repeated stress on your feet can cause damage over time. The right shoes can help protect your feet. The wrong shoes can cause more foot problems. Read the information below to help you find a shoe that fits your foot needs.      A good shoe fit will cover your foot outline. A shoe that doesnt cover the outline is a bad fit.   Whats your foot shape?  To get a good fit, you need to know the shape of your foot. Do this simple test: While standing, place your foot on a piece of paper and trace around it. Is your foot straight or curved? Do you have a foot problem, such as a bunion, that causes your foot outline to show a bulge on the side of your big toe?  Finding your fit  Bring your foot outline to the shoe store to help you find the right shoe. Place a shoe you like on top of the outline to see if it matches the shape. The shoe should cover the outline. (If you have a bunion, the shoe may not cover the bulge on the outline. Look for soft leather shoes to stretch over the bunion.) Once youve found a pair of proper shoes, put them on. Walk around. Be sure the shoes dont rub or pinch. If the shoes feel good, youve found your fit!  The right shoe for you  A good shoe has features that provide comfort and support. It must also be the right size and shape for your feet. Look for a shoe made of breathable fabric and lining, such as leather or canvas. Be sure that shoes have enough tread to prevent slipping. Go to a good shoe store for help finding the right shoe.  Good shoe features  An ideal shoe has the following:  · Laces for support. If tying laces is a problem for you, try shoes with Velcro fasteners or agustina.  · A front of the shoe (toe box) with ½ inch space in front of your longest toes.  · An arch  shape that supports your foot.  · No more than 1½ inches of heel.  · A stiff, snug back of the shoe to keep your foot from sliding around.  · A smooth lining with no rough seams.  Shoe shopping tips  Below are some dos and donts for when you go to the shoe store.  Do:  · Select the shoes that feel right. Wear them around the house. Then bring them to your foot healthcare provider to check for fit. If they dont fit well, return them.  · Shop late in the day, when your feet will be slightly bigger.  · Each time you buy shoes, have both your feet measured while you are standing. Foot size changes with time.  · Pick shoes to suit their purpose. High heels are OK for an occasional night on the town. But for everyday wear, choose a more sensible shoe.  · Try on shoes while wearing any inserts specially made for your feet (orthoses).  · Try on both the right and left shoes. If your feet are different sizes, pick a pair that fits the larger foot.  Dont:  · Dont buy shoes based on shoe size alone. Always try on shoes, as sizes differ from brand to brand and within brands.  · Dont expect shoes to break in. If they dont fit at the store, dont buy them.  · Dont buy a shoe that doesnt match your foot shape.  What about socks?  Always wear socks with shoes. Socks help absorb sweat and reduce friction and blistering. When shopping for shoes, choose soft, padded socks with seams that dont irritate your feet.  If you have foot problems  Some foot problems cause deformities. This can make it hard to find a good fit. Look for shoes made of soft leather to stretch over the deformity. If you have bunions, buy shoes with a wider toe box. To fit hammertoes, look for shoes with a tall toe box. If you have arch problems, you may need inserts. In some cases, youll need to have custom footwear or orthoses made for your feet.  Suggested footwear  Ask your healthcare provider what kind of footwear you need. He or she may recommend  a certain brand or shoe store.  Date Last Reviewed: 8/1/2016  © 2048-4672 [employer]. [address] [place] [address], [place] [address]. All rights reserved. This information is not intended as a substitute for professional medical care. Always follow your healthcare professional's instructions.        Treating Strains and Sprains  Strains and sprains happen when muscles or other soft tissues near your bones stretch or tear. These injuries can cause bruising, swelling, and pain. To ease your discomfort and speed the healing of your strain or sprain, follow the tips below. Remember, a strain or sprain can take 6 to 8 weeks to heal.     Important Note: Do not give aspirin to children or teens without discussing it with your healthcare provider first.        Ice first, heat later  · Use ice for the first 24 to 48 hours after injury. Ice helps prevent swelling and reduce pain. Ice the injury for no more than 20 minutes at a time and allow at least  20 minutes between icing sessions.  · Apply heat after the first 72 hours, once the swelling has gone down. Heat relaxes muscles and increases blood flow. Soak the injured area in warm water or use a heating pad set on low for no more than 15 minutes at a time.  Wrap and elevate  · Wrap an injured limb firmly with an elastic bandage. This provides support and helps prevent swelling. Dont wear an elastic bandage overnight. Watch for tingling, numbness, or increased pain, and remove the bandage immediately if any of these occurs.  · Elevate the injured area to help reduce swelling and throbbing. Its best to raise an injured limb above the level of your heart.     Medicines  · Over-the-counter medicines such as acetaminophen or ibuprofen can help reduce pain. Some also help reduce swelling.  · Take medicine only as directed.  · Rest the area even if medicines are controlling the pain.  Rest  · Rest the injured area by not using it for 24 hours.  · When youre ready,  return slowly to your normal activities. Rest the injured area often.  · Dont use or walk on an injured limb if it hurts.  Date Last Reviewed: 9/3/2015  © 9080-1852 [employer]. [address] [place] [address], [place] [address]. All rights reserved. This information is not intended as a substitute for professional medical care. Always follow your healthcare professional's instructions.

## 2021-01-03 ENCOUNTER — PATIENT MESSAGE (OUTPATIENT)
Dept: FAMILY MEDICINE | Facility: CLINIC | Age: 56
End: 2021-01-03

## 2021-01-04 ENCOUNTER — OFFICE VISIT (OUTPATIENT)
Dept: URGENT CARE | Facility: CLINIC | Age: 56
End: 2021-01-04
Payer: MEDICAID

## 2021-01-04 ENCOUNTER — PATIENT MESSAGE (OUTPATIENT)
Dept: FAMILY MEDICINE | Facility: CLINIC | Age: 56
End: 2021-01-04

## 2021-01-04 ENCOUNTER — PATIENT MESSAGE (OUTPATIENT)
Dept: ADMINISTRATIVE | Facility: HOSPITAL | Age: 56
End: 2021-01-04

## 2021-01-04 ENCOUNTER — TELEPHONE (OUTPATIENT)
Dept: FAMILY MEDICINE | Facility: CLINIC | Age: 56
End: 2021-01-04

## 2021-01-04 VITALS
RESPIRATION RATE: 16 BRPM | HEIGHT: 61 IN | BODY MASS INDEX: 27.94 KG/M2 | TEMPERATURE: 98 F | OXYGEN SATURATION: 98 % | WEIGHT: 148 LBS | SYSTOLIC BLOOD PRESSURE: 124 MMHG | HEART RATE: 75 BPM | DIASTOLIC BLOOD PRESSURE: 72 MMHG

## 2021-01-04 DIAGNOSIS — B02.9 HERPES ZOSTER WITHOUT COMPLICATION: Primary | ICD-10-CM

## 2021-01-04 PROCEDURE — 99214 PR OFFICE/OUTPT VISIT, EST, LEVL IV, 30-39 MIN: ICD-10-PCS | Mod: S$GLB,,, | Performed by: NURSE PRACTITIONER

## 2021-01-04 PROCEDURE — 99214 OFFICE O/P EST MOD 30 MIN: CPT | Mod: S$GLB,,, | Performed by: NURSE PRACTITIONER

## 2021-01-04 RX ORDER — VALACYCLOVIR HYDROCHLORIDE 1 G/1
1000 TABLET, FILM COATED ORAL 3 TIMES DAILY
Qty: 21 TABLET | Refills: 0 | Status: SHIPPED | OUTPATIENT
Start: 2021-01-04 | End: 2021-05-05

## 2021-01-25 ENCOUNTER — OFFICE VISIT (OUTPATIENT)
Dept: PODIATRY | Facility: CLINIC | Age: 56
End: 2021-01-25
Payer: MEDICAID

## 2021-01-25 VITALS — RESPIRATION RATE: 16 BRPM | OXYGEN SATURATION: 99 % | BODY MASS INDEX: 27.96 KG/M2 | HEIGHT: 61 IN | HEART RATE: 76 BPM

## 2021-01-25 DIAGNOSIS — M21.619 BUNION: ICD-10-CM

## 2021-01-25 DIAGNOSIS — M21.621 TAILOR'S BUNION OF BOTH FEET: ICD-10-CM

## 2021-01-25 DIAGNOSIS — D36.10 NEUROMA: Primary | ICD-10-CM

## 2021-01-25 DIAGNOSIS — M79.671 RIGHT FOOT PAIN: ICD-10-CM

## 2021-01-25 DIAGNOSIS — M21.622 TAILOR'S BUNION OF BOTH FEET: ICD-10-CM

## 2021-01-25 PROCEDURE — 99203 OFFICE O/P NEW LOW 30 MIN: CPT | Mod: S$GLB,,, | Performed by: PODIATRIST

## 2021-01-25 PROCEDURE — 99203 PR OFFICE/OUTPT VISIT, NEW, LEVL III, 30-44 MIN: ICD-10-PCS | Mod: S$GLB,,, | Performed by: PODIATRIST

## 2021-01-25 RX ORDER — METHYLPREDNISOLONE 4 MG/1
TABLET ORAL
Qty: 1 PACKAGE | Refills: 0 | Status: SHIPPED | OUTPATIENT
Start: 2021-01-25 | End: 2021-05-05 | Stop reason: ALTCHOICE

## 2021-03-18 DIAGNOSIS — M85.88 OTHER SPECIFIED DISORDERS OF BONE DENSITY AND STRUCTURE, OTHER SITE: Primary | ICD-10-CM

## 2021-03-22 ENCOUNTER — HOSPITAL ENCOUNTER (OUTPATIENT)
Dept: RADIOLOGY | Facility: CLINIC | Age: 56
Discharge: HOME | End: 2021-03-22
Attending: INTERNAL MEDICINE
Payer: MEDICAID

## 2021-03-22 DIAGNOSIS — M85.88 OTHER SPECIFIED DISORDERS OF BONE DENSITY AND STRUCTURE, OTHER SITE: ICD-10-CM

## 2021-03-22 PROCEDURE — 77080 DEXA BONE DENSITY SPINE HIP: ICD-10-PCS | Mod: 26,,, | Performed by: RADIOLOGY

## 2021-03-22 PROCEDURE — 77080 DXA BONE DENSITY AXIAL: CPT | Mod: 26,,, | Performed by: RADIOLOGY

## 2021-03-22 PROCEDURE — 77080 DXA BONE DENSITY AXIAL: CPT | Mod: TC,PO

## 2021-03-25 ENCOUNTER — HOSPITAL ENCOUNTER (OUTPATIENT)
Dept: RADIOLOGY | Facility: HOSPITAL | Age: 56
Discharge: HOME | End: 2021-03-25
Attending: INTERNAL MEDICINE
Payer: MEDICAID

## 2021-03-25 DIAGNOSIS — E04.1 NONTOXIC UNINODULAR GOITER: ICD-10-CM

## 2021-03-25 PROCEDURE — 76536 US THYROID: ICD-10-PCS | Mod: 26,,, | Performed by: RADIOLOGY

## 2021-03-25 PROCEDURE — 76536 US EXAM OF HEAD AND NECK: CPT | Mod: TC

## 2021-03-25 PROCEDURE — 76536 US EXAM OF HEAD AND NECK: CPT | Mod: 26,,, | Performed by: RADIOLOGY

## 2021-04-05 ENCOUNTER — LAB VISIT (OUTPATIENT)
Dept: LAB | Facility: HOSPITAL | Age: 56
End: 2021-04-05
Attending: INTERNAL MEDICINE
Payer: MEDICAID

## 2021-04-05 DIAGNOSIS — M89.9 BONE DISEASE: Primary | ICD-10-CM

## 2021-04-05 PROCEDURE — 36415 COLL VENOUS BLD VENIPUNCTURE: CPT | Mod: PO | Performed by: INTERNAL MEDICINE

## 2021-04-05 PROCEDURE — 82306 VITAMIN D 25 HYDROXY: CPT | Performed by: INTERNAL MEDICINE

## 2021-04-06 ENCOUNTER — PATIENT MESSAGE (OUTPATIENT)
Dept: ADMINISTRATIVE | Facility: HOSPITAL | Age: 56
End: 2021-04-06

## 2021-04-06 LAB — 25(OH)D3+25(OH)D2 SERPL-MCNC: 30 NG/ML (ref 30–96)

## 2021-05-05 ENCOUNTER — DOCUMENTATION ONLY (OUTPATIENT)
Dept: FAMILY MEDICINE | Facility: CLINIC | Age: 56
End: 2021-05-05

## 2021-05-05 ENCOUNTER — OFFICE VISIT (OUTPATIENT)
Dept: FAMILY MEDICINE | Facility: CLINIC | Age: 56
End: 2021-05-05
Payer: MEDICAID

## 2021-05-05 VITALS
SYSTOLIC BLOOD PRESSURE: 110 MMHG | OXYGEN SATURATION: 97 % | WEIGHT: 143.31 LBS | BODY MASS INDEX: 27.06 KG/M2 | HEIGHT: 61 IN | RESPIRATION RATE: 18 BRPM | HEART RATE: 60 BPM | DIASTOLIC BLOOD PRESSURE: 88 MMHG

## 2021-05-05 DIAGNOSIS — F41.9 ANXIETY: ICD-10-CM

## 2021-05-05 DIAGNOSIS — Z00.00 ROUTINE GENERAL MEDICAL EXAMINATION AT A HEALTH CARE FACILITY: Primary | ICD-10-CM

## 2021-05-05 PROCEDURE — 99214 PR OFFICE/OUTPT VISIT, EST, LEVL IV, 30-39 MIN: ICD-10-PCS | Mod: S$PBB,,, | Performed by: STUDENT IN AN ORGANIZED HEALTH CARE EDUCATION/TRAINING PROGRAM

## 2021-05-05 PROCEDURE — 99214 OFFICE O/P EST MOD 30 MIN: CPT | Mod: PBBFAC,PO | Performed by: STUDENT IN AN ORGANIZED HEALTH CARE EDUCATION/TRAINING PROGRAM

## 2021-05-05 PROCEDURE — 99214 OFFICE O/P EST MOD 30 MIN: CPT | Mod: S$PBB,,, | Performed by: STUDENT IN AN ORGANIZED HEALTH CARE EDUCATION/TRAINING PROGRAM

## 2021-05-05 PROCEDURE — 99999 PR PBB SHADOW E&M-EST. PATIENT-LVL IV: ICD-10-PCS | Mod: PBBFAC,,, | Performed by: STUDENT IN AN ORGANIZED HEALTH CARE EDUCATION/TRAINING PROGRAM

## 2021-05-05 PROCEDURE — 99999 PR PBB SHADOW E&M-EST. PATIENT-LVL IV: CPT | Mod: PBBFAC,,, | Performed by: STUDENT IN AN ORGANIZED HEALTH CARE EDUCATION/TRAINING PROGRAM

## 2021-05-05 RX ORDER — ALPRAZOLAM 0.5 MG/1
0.5 TABLET ORAL DAILY PRN
Qty: 30 TABLET | Refills: 0 | Status: SHIPPED | OUTPATIENT
Start: 2021-05-05 | End: 2021-08-13

## 2021-05-26 ENCOUNTER — LAB VISIT (OUTPATIENT)
Dept: LAB | Facility: HOSPITAL | Age: 56
End: 2021-05-26
Attending: STUDENT IN AN ORGANIZED HEALTH CARE EDUCATION/TRAINING PROGRAM
Payer: MEDICAID

## 2021-05-26 DIAGNOSIS — Z00.00 ROUTINE GENERAL MEDICAL EXAMINATION AT A HEALTH CARE FACILITY: ICD-10-CM

## 2021-05-26 PROCEDURE — 82274 ASSAY TEST FOR BLOOD FECAL: CPT | Performed by: STUDENT IN AN ORGANIZED HEALTH CARE EDUCATION/TRAINING PROGRAM

## 2021-06-02 LAB — HEMOCCULT STL QL IA: NEGATIVE

## 2021-06-15 ENCOUNTER — HOSPITAL ENCOUNTER (EMERGENCY)
Facility: HOSPITAL | Age: 56
Discharge: HOME | End: 2021-06-15
Attending: EMERGENCY MEDICINE
Payer: MEDICAID

## 2021-06-15 VITALS
DIASTOLIC BLOOD PRESSURE: 72 MMHG | RESPIRATION RATE: 22 BRPM | HEIGHT: 61 IN | TEMPERATURE: 98 F | BODY MASS INDEX: 27 KG/M2 | WEIGHT: 143 LBS | HEART RATE: 85 BPM | SYSTOLIC BLOOD PRESSURE: 116 MMHG | OXYGEN SATURATION: 97 %

## 2021-06-15 DIAGNOSIS — F41.1 ANXIETY REACTION: Primary | ICD-10-CM

## 2021-06-15 LAB
ALBUMIN SERPL BCP-MCNC: 3.8 G/DL (ref 3.5–5.2)
ALP SERPL-CCNC: 54 U/L (ref 55–135)
ALT SERPL W/O P-5'-P-CCNC: 20 U/L (ref 10–44)
ANION GAP SERPL CALC-SCNC: 9 MMOL/L (ref 8–16)
AST SERPL-CCNC: 22 U/L (ref 10–40)
BILIRUB SERPL-MCNC: 0.5 MG/DL (ref 0.1–1)
BUN SERPL-MCNC: 12 MG/DL (ref 6–20)
CALCIUM SERPL-MCNC: 9.2 MG/DL (ref 8.7–10.5)
CHLORIDE SERPL-SCNC: 103 MMOL/L (ref 95–110)
CO2 SERPL-SCNC: 27 MMOL/L (ref 23–29)
CREAT SERPL-MCNC: 0.8 MG/DL (ref 0.5–1.4)
EST. GFR  (AFRICAN AMERICAN): >60 ML/MIN/1.73 M^2
EST. GFR  (NON AFRICAN AMERICAN): >60 ML/MIN/1.73 M^2
GLUCOSE SERPL-MCNC: 94 MG/DL (ref 70–110)
POTASSIUM SERPL-SCNC: 4.7 MMOL/L (ref 3.5–5.1)
PROT SERPL-MCNC: 6.4 G/DL (ref 6–8.4)
SODIUM SERPL-SCNC: 139 MMOL/L (ref 136–145)
T3FREE SERPL-MCNC: 2.4 PG/ML (ref 2.3–4.2)
T4 FREE SERPL-MCNC: 0.92 NG/DL (ref 0.71–1.51)
TSH SERPL DL<=0.005 MIU/L-ACNC: 4.19 UIU/ML (ref 0.4–4)

## 2021-06-15 PROCEDURE — 99283 EMERGENCY DEPT VISIT LOW MDM: CPT

## 2021-06-15 PROCEDURE — 84439 ASSAY OF FREE THYROXINE: CPT | Performed by: EMERGENCY MEDICINE

## 2021-06-15 PROCEDURE — 80053 COMPREHEN METABOLIC PANEL: CPT | Performed by: EMERGENCY MEDICINE

## 2021-06-15 PROCEDURE — 84443 ASSAY THYROID STIM HORMONE: CPT | Performed by: EMERGENCY MEDICINE

## 2021-06-15 PROCEDURE — 36415 COLL VENOUS BLD VENIPUNCTURE: CPT | Performed by: EMERGENCY MEDICINE

## 2021-06-15 PROCEDURE — 99284 EMERGENCY DEPT VISIT MOD MDM: CPT

## 2021-06-15 PROCEDURE — 84481 FREE ASSAY (FT-3): CPT | Performed by: EMERGENCY MEDICINE

## 2021-06-28 ENCOUNTER — OFFICE VISIT (OUTPATIENT)
Dept: URGENT CARE | Facility: CLINIC | Age: 56
End: 2021-06-28
Payer: MEDICAID

## 2021-06-28 VITALS
SYSTOLIC BLOOD PRESSURE: 124 MMHG | HEART RATE: 89 BPM | TEMPERATURE: 99 F | OXYGEN SATURATION: 97 % | DIASTOLIC BLOOD PRESSURE: 72 MMHG | BODY MASS INDEX: 27.21 KG/M2 | RESPIRATION RATE: 16 BRPM | WEIGHT: 144 LBS

## 2021-06-28 DIAGNOSIS — J01.90 ACUTE NON-RECURRENT SINUSITIS, UNSPECIFIED LOCATION: Primary | ICD-10-CM

## 2021-06-28 DIAGNOSIS — J02.9 SORE THROAT: ICD-10-CM

## 2021-06-28 LAB
CTP QC/QA: YES
S PYO RRNA THROAT QL PROBE: NEGATIVE

## 2021-06-28 PROCEDURE — 99214 OFFICE O/P EST MOD 30 MIN: CPT | Mod: S$GLB,,, | Performed by: NURSE PRACTITIONER

## 2021-06-28 PROCEDURE — 87880 POCT RAPID STREP A: ICD-10-PCS | Mod: QW,,, | Performed by: NURSE PRACTITIONER

## 2021-06-28 PROCEDURE — 99214 PR OFFICE/OUTPT VISIT, EST, LEVL IV, 30-39 MIN: ICD-10-PCS | Mod: S$GLB,,, | Performed by: NURSE PRACTITIONER

## 2021-06-28 PROCEDURE — 87880 STREP A ASSAY W/OPTIC: CPT | Mod: QW,,, | Performed by: NURSE PRACTITIONER

## 2021-06-28 RX ORDER — CHOLECALCIFEROL (VITAMIN D3) 25 MCG
1000 TABLET ORAL DAILY
COMMUNITY
Start: 2021-04-27

## 2021-06-28 RX ORDER — DEXAMETHASONE SODIUM PHOSPHATE 4 MG/ML
8 INJECTION, SOLUTION INTRA-ARTICULAR; INTRALESIONAL; INTRAMUSCULAR; INTRAVENOUS; SOFT TISSUE
Status: COMPLETED | OUTPATIENT
Start: 2021-06-28 | End: 2021-06-28

## 2021-06-28 RX ORDER — PREDNISONE 20 MG/1
20 TABLET ORAL 2 TIMES DAILY
Qty: 10 TABLET | Refills: 0 | Status: SHIPPED | OUTPATIENT
Start: 2021-06-28 | End: 2021-07-03

## 2021-06-28 RX ORDER — CETIRIZINE HYDROCHLORIDE 10 MG/1
10 TABLET ORAL DAILY
Qty: 30 TABLET | Refills: 2 | Status: SHIPPED | OUTPATIENT
Start: 2021-06-28 | End: 2021-09-28

## 2021-06-28 RX ORDER — FLUTICASONE PROPIONATE 50 MCG
2 SPRAY, SUSPENSION (ML) NASAL 2 TIMES DAILY
Qty: 15.8 ML | Refills: 0 | Status: SHIPPED | OUTPATIENT
Start: 2021-06-28 | End: 2021-09-28

## 2021-06-28 RX ADMIN — DEXAMETHASONE SODIUM PHOSPHATE 8 MG: 4 INJECTION, SOLUTION INTRA-ARTICULAR; INTRALESIONAL; INTRAMUSCULAR; INTRAVENOUS; SOFT TISSUE at 10:06

## 2021-06-30 ENCOUNTER — OFFICE VISIT (OUTPATIENT)
Dept: URGENT CARE | Facility: CLINIC | Age: 56
End: 2021-06-30
Payer: MEDICAID

## 2021-06-30 ENCOUNTER — TELEPHONE (OUTPATIENT)
Dept: FAMILY MEDICINE | Facility: CLINIC | Age: 56
End: 2021-06-30

## 2021-06-30 VITALS
HEART RATE: 94 BPM | BODY MASS INDEX: 26.8 KG/M2 | WEIGHT: 145.63 LBS | SYSTOLIC BLOOD PRESSURE: 125 MMHG | TEMPERATURE: 100 F | OXYGEN SATURATION: 97 % | DIASTOLIC BLOOD PRESSURE: 76 MMHG | HEIGHT: 62 IN

## 2021-06-30 DIAGNOSIS — B96.89 BACTERIAL URI: Primary | ICD-10-CM

## 2021-06-30 DIAGNOSIS — Z20.822 COVID-19 VIRUS NOT DETECTED: ICD-10-CM

## 2021-06-30 DIAGNOSIS — J06.9 BACTERIAL URI: Primary | ICD-10-CM

## 2021-06-30 DIAGNOSIS — J40 BRONCHITIS: ICD-10-CM

## 2021-06-30 DIAGNOSIS — R09.81 CONGESTION OF NASAL SINUS: ICD-10-CM

## 2021-06-30 LAB
CTP QC/QA: YES
SARS-COV-2 RDRP RESP QL NAA+PROBE: NEGATIVE

## 2021-06-30 PROCEDURE — 99214 OFFICE O/P EST MOD 30 MIN: CPT | Mod: S$GLB,,, | Performed by: NURSE PRACTITIONER

## 2021-06-30 PROCEDURE — 87635 SARS-COV-2 COVID-19 AMP PRB: CPT | Mod: QW,S$GLB,, | Performed by: NURSE PRACTITIONER

## 2021-06-30 PROCEDURE — 99214 PR OFFICE/OUTPT VISIT, EST, LEVL IV, 30-39 MIN: ICD-10-PCS | Mod: S$GLB,,, | Performed by: NURSE PRACTITIONER

## 2021-06-30 PROCEDURE — 87635 PR SARS-COV-2 COVID-19 AMPLIFIED PROBE: ICD-10-PCS | Mod: QW,S$GLB,, | Performed by: NURSE PRACTITIONER

## 2021-06-30 RX ORDER — ALBUTEROL SULFATE 90 UG/1
2 AEROSOL, METERED RESPIRATORY (INHALATION) EVERY 6 HOURS PRN
Qty: 18 G | Refills: 0 | Status: SHIPPED | OUTPATIENT
Start: 2021-06-30 | End: 2021-09-28

## 2021-06-30 RX ORDER — DOXYCYCLINE 100 MG/1
100 CAPSULE ORAL 2 TIMES DAILY
Qty: 20 CAPSULE | Refills: 0 | Status: SHIPPED | OUTPATIENT
Start: 2021-06-30 | End: 2021-07-10

## 2021-06-30 RX ORDER — PROMETHAZINE HYDROCHLORIDE AND DEXTROMETHORPHAN HYDROBROMIDE 6.25; 15 MG/5ML; MG/5ML
5 SYRUP ORAL EVERY 4 HOURS PRN
Qty: 240 ML | Refills: 0 | Status: SHIPPED | OUTPATIENT
Start: 2021-06-30 | End: 2021-07-10

## 2021-07-07 ENCOUNTER — PATIENT MESSAGE (OUTPATIENT)
Dept: ADMINISTRATIVE | Facility: HOSPITAL | Age: 56
End: 2021-07-07

## 2021-07-22 ENCOUNTER — OFFICE VISIT (OUTPATIENT)
Dept: FAMILY MEDICINE | Facility: CLINIC | Age: 56
End: 2021-07-22
Payer: MEDICAID

## 2021-07-22 VITALS
BODY MASS INDEX: 26.65 KG/M2 | OXYGEN SATURATION: 97 % | WEIGHT: 144.81 LBS | HEART RATE: 82 BPM | SYSTOLIC BLOOD PRESSURE: 100 MMHG | RESPIRATION RATE: 18 BRPM | HEIGHT: 62 IN | DIASTOLIC BLOOD PRESSURE: 80 MMHG

## 2021-07-22 DIAGNOSIS — Z00.00 ROUTINE GENERAL MEDICAL EXAMINATION AT A HEALTH CARE FACILITY: Primary | ICD-10-CM

## 2021-07-22 DIAGNOSIS — J01.90 ACUTE BACTERIAL SINUSITIS: ICD-10-CM

## 2021-07-22 DIAGNOSIS — B96.89 ACUTE BACTERIAL SINUSITIS: ICD-10-CM

## 2021-07-22 DIAGNOSIS — F41.1 GENERALIZED ANXIETY DISORDER: ICD-10-CM

## 2021-07-22 PROCEDURE — 99214 OFFICE O/P EST MOD 30 MIN: CPT | Mod: S$PBB,,, | Performed by: STUDENT IN AN ORGANIZED HEALTH CARE EDUCATION/TRAINING PROGRAM

## 2021-07-22 PROCEDURE — 99999 PR PBB SHADOW E&M-EST. PATIENT-LVL V: ICD-10-PCS | Mod: PBBFAC,,, | Performed by: STUDENT IN AN ORGANIZED HEALTH CARE EDUCATION/TRAINING PROGRAM

## 2021-07-22 PROCEDURE — 99215 OFFICE O/P EST HI 40 MIN: CPT | Mod: PBBFAC,PO | Performed by: STUDENT IN AN ORGANIZED HEALTH CARE EDUCATION/TRAINING PROGRAM

## 2021-07-22 PROCEDURE — 99214 PR OFFICE/OUTPT VISIT, EST, LEVL IV, 30-39 MIN: ICD-10-PCS | Mod: S$PBB,,, | Performed by: STUDENT IN AN ORGANIZED HEALTH CARE EDUCATION/TRAINING PROGRAM

## 2021-07-22 PROCEDURE — 99999 PR PBB SHADOW E&M-EST. PATIENT-LVL V: CPT | Mod: PBBFAC,,, | Performed by: STUDENT IN AN ORGANIZED HEALTH CARE EDUCATION/TRAINING PROGRAM

## 2021-07-22 RX ORDER — ESCITALOPRAM OXALATE 5 MG/1
5 TABLET ORAL DAILY
Qty: 30 TABLET | Refills: 11 | Status: SHIPPED | OUTPATIENT
Start: 2021-07-22 | End: 2021-09-16 | Stop reason: SDUPTHER

## 2021-07-22 RX ORDER — AMOXICILLIN AND CLAVULANATE POTASSIUM 875; 125 MG/1; MG/1
1 TABLET, FILM COATED ORAL EVERY 12 HOURS
Qty: 14 TABLET | Refills: 0 | Status: SHIPPED | OUTPATIENT
Start: 2021-07-22 | End: 2021-07-29

## 2021-07-30 ENCOUNTER — HOSPITAL ENCOUNTER (EMERGENCY)
Facility: HOSPITAL | Age: 56
Discharge: HOME | End: 2021-07-30
Attending: EMERGENCY MEDICINE
Payer: MEDICAID

## 2021-07-30 ENCOUNTER — TELEPHONE (OUTPATIENT)
Dept: FAMILY MEDICINE | Facility: CLINIC | Age: 56
End: 2021-07-30

## 2021-07-30 ENCOUNTER — PATIENT MESSAGE (OUTPATIENT)
Dept: FAMILY MEDICINE | Facility: CLINIC | Age: 56
End: 2021-07-30

## 2021-07-30 VITALS
RESPIRATION RATE: 18 BRPM | HEART RATE: 86 BPM | HEIGHT: 62 IN | WEIGHT: 140 LBS | DIASTOLIC BLOOD PRESSURE: 74 MMHG | BODY MASS INDEX: 25.76 KG/M2 | OXYGEN SATURATION: 99 % | TEMPERATURE: 98 F | SYSTOLIC BLOOD PRESSURE: 120 MMHG

## 2021-07-30 DIAGNOSIS — T78.40XA ALLERGIC REACTION, INITIAL ENCOUNTER: ICD-10-CM

## 2021-07-30 DIAGNOSIS — L50.9 URTICARIA: Primary | ICD-10-CM

## 2021-07-30 PROCEDURE — 99283 EMERGENCY DEPT VISIT LOW MDM: CPT

## 2021-07-30 PROCEDURE — 25000003 PHARM REV CODE 250: Performed by: EMERGENCY MEDICINE

## 2021-07-30 RX ORDER — FAMOTIDINE 20 MG/1
20 TABLET, FILM COATED ORAL
Status: COMPLETED | OUTPATIENT
Start: 2021-07-30 | End: 2021-07-30

## 2021-07-30 RX ORDER — DIPHENHYDRAMINE HCL 25 MG
25 CAPSULE ORAL
Status: COMPLETED | OUTPATIENT
Start: 2021-07-30 | End: 2021-07-30

## 2021-07-30 RX ADMIN — DIPHENHYDRAMINE HYDROCHLORIDE 25 MG: 25 CAPSULE ORAL at 10:07

## 2021-07-30 RX ADMIN — FAMOTIDINE 20 MG: 20 TABLET, FILM COATED ORAL at 10:07

## 2021-08-02 ENCOUNTER — PATIENT MESSAGE (OUTPATIENT)
Dept: FAMILY MEDICINE | Facility: CLINIC | Age: 56
End: 2021-08-02

## 2021-08-03 ENCOUNTER — PATIENT MESSAGE (OUTPATIENT)
Dept: FAMILY MEDICINE | Facility: CLINIC | Age: 56
End: 2021-08-03

## 2021-08-04 ENCOUNTER — PATIENT MESSAGE (OUTPATIENT)
Dept: FAMILY MEDICINE | Facility: CLINIC | Age: 56
End: 2021-08-04

## 2021-08-04 ENCOUNTER — LAB VISIT (OUTPATIENT)
Dept: LAB | Facility: HOSPITAL | Age: 56
End: 2021-08-04
Attending: INTERNAL MEDICINE
Payer: MEDICAID

## 2021-08-04 DIAGNOSIS — E03.9 MYXEDEMA HEART DISEASE: Primary | ICD-10-CM

## 2021-08-04 DIAGNOSIS — I51.9 MYXEDEMA HEART DISEASE: Primary | ICD-10-CM

## 2021-08-04 LAB
T3FREE SERPL-MCNC: 3.1 PG/ML (ref 2.3–4.2)
T4 FREE SERPL-MCNC: 0.97 NG/DL (ref 0.71–1.51)
TSH SERPL DL<=0.005 MIU/L-ACNC: 1.75 UIU/ML (ref 0.4–4)

## 2021-08-04 PROCEDURE — 36415 COLL VENOUS BLD VENIPUNCTURE: CPT | Mod: PO | Performed by: INTERNAL MEDICINE

## 2021-08-04 PROCEDURE — 84443 ASSAY THYROID STIM HORMONE: CPT | Performed by: INTERNAL MEDICINE

## 2021-08-04 PROCEDURE — 84481 FREE ASSAY (FT-3): CPT | Performed by: INTERNAL MEDICINE

## 2021-08-04 PROCEDURE — 84439 ASSAY OF FREE THYROXINE: CPT | Performed by: INTERNAL MEDICINE

## 2021-08-09 ENCOUNTER — PATIENT OUTREACH (OUTPATIENT)
Dept: ADMINISTRATIVE | Facility: OTHER | Age: 56
End: 2021-08-09

## 2021-08-10 ENCOUNTER — OFFICE VISIT (OUTPATIENT)
Dept: OBSTETRICS AND GYNECOLOGY | Facility: CLINIC | Age: 56
End: 2021-08-10
Payer: MEDICAID

## 2021-08-10 VITALS
WEIGHT: 141.13 LBS | SYSTOLIC BLOOD PRESSURE: 102 MMHG | BODY MASS INDEX: 25.81 KG/M2 | DIASTOLIC BLOOD PRESSURE: 80 MMHG

## 2021-08-10 DIAGNOSIS — Z01.419 WELL WOMAN EXAM WITH ROUTINE GYNECOLOGICAL EXAM: Primary | ICD-10-CM

## 2021-08-10 DIAGNOSIS — Z78.0 MENOPAUSE: ICD-10-CM

## 2021-08-10 DIAGNOSIS — Z12.4 SCREENING FOR CERVICAL CANCER: ICD-10-CM

## 2021-08-10 PROCEDURE — 87624 HPV HI-RISK TYP POOLED RSLT: CPT | Performed by: OBSTETRICS & GYNECOLOGY

## 2021-08-10 PROCEDURE — 99999 PR PBB SHADOW E&M-EST. PATIENT-LVL III: ICD-10-PCS | Mod: PBBFAC,,, | Performed by: OBSTETRICS & GYNECOLOGY

## 2021-08-10 PROCEDURE — 99999 PR PBB SHADOW E&M-EST. PATIENT-LVL III: CPT | Mod: PBBFAC,,, | Performed by: OBSTETRICS & GYNECOLOGY

## 2021-08-10 PROCEDURE — 99213 OFFICE O/P EST LOW 20 MIN: CPT | Mod: PBBFAC | Performed by: OBSTETRICS & GYNECOLOGY

## 2021-08-10 PROCEDURE — 99386 PR PREVENTIVE VISIT,NEW,40-64: ICD-10-PCS | Mod: S$PBB,,, | Performed by: OBSTETRICS & GYNECOLOGY

## 2021-08-10 PROCEDURE — 99386 PREV VISIT NEW AGE 40-64: CPT | Mod: S$PBB,,, | Performed by: OBSTETRICS & GYNECOLOGY

## 2021-08-10 PROCEDURE — 88175 CYTOPATH C/V AUTO FLUID REDO: CPT | Performed by: OBSTETRICS & GYNECOLOGY

## 2021-08-10 RX ORDER — LEVOTHYROXINE SODIUM 50 UG/1
50 TABLET ORAL
COMMUNITY
End: 2022-09-08

## 2021-08-11 ENCOUNTER — TELEPHONE (OUTPATIENT)
Dept: FAMILY MEDICINE | Facility: CLINIC | Age: 56
End: 2021-08-11

## 2021-08-13 LAB
FINAL PATHOLOGIC DIAGNOSIS: NORMAL
Lab: NORMAL

## 2021-08-18 LAB
HPV HR 12 DNA SPEC QL NAA+PROBE: NEGATIVE
HPV16 AG SPEC QL: NEGATIVE
HPV18 DNA SPEC QL NAA+PROBE: NEGATIVE

## 2021-09-13 ENCOUNTER — IMMUNIZATION (OUTPATIENT)
Dept: PRIMARY CARE CLINIC | Facility: CLINIC | Age: 56
End: 2021-09-13
Payer: MEDICAID

## 2021-09-13 DIAGNOSIS — Z23 NEED FOR VACCINATION: Primary | ICD-10-CM

## 2021-09-13 PROCEDURE — 91300 COVID-19, MRNA, LNP-S, PF, 30 MCG/0.3 ML DOSE VACCINE: CPT | Mod: S$GLB,,, | Performed by: FAMILY MEDICINE

## 2021-09-13 PROCEDURE — 0001A COVID-19, MRNA, LNP-S, PF, 30 MCG/0.3 ML DOSE VACCINE: ICD-10-PCS | Mod: CV19,S$GLB,, | Performed by: FAMILY MEDICINE

## 2021-09-13 PROCEDURE — 91300 COVID-19, MRNA, LNP-S, PF, 30 MCG/0.3 ML DOSE VACCINE: ICD-10-PCS | Mod: S$GLB,,, | Performed by: FAMILY MEDICINE

## 2021-09-13 PROCEDURE — 0001A COVID-19, MRNA, LNP-S, PF, 30 MCG/0.3 ML DOSE VACCINE: CPT | Mod: CV19,S$GLB,, | Performed by: FAMILY MEDICINE

## 2021-09-16 ENCOUNTER — OFFICE VISIT (OUTPATIENT)
Dept: FAMILY MEDICINE | Facility: CLINIC | Age: 56
End: 2021-09-16
Payer: MEDICAID

## 2021-09-16 VITALS
WEIGHT: 142.19 LBS | HEIGHT: 62 IN | OXYGEN SATURATION: 95 % | BODY MASS INDEX: 26.17 KG/M2 | HEART RATE: 84 BPM | DIASTOLIC BLOOD PRESSURE: 76 MMHG | RESPIRATION RATE: 18 BRPM | SYSTOLIC BLOOD PRESSURE: 100 MMHG

## 2021-09-16 DIAGNOSIS — L50.9 URTICARIA: Primary | ICD-10-CM

## 2021-09-16 DIAGNOSIS — F41.1 GENERALIZED ANXIETY DISORDER: ICD-10-CM

## 2021-09-16 PROCEDURE — 99999 PR PBB SHADOW E&M-EST. PATIENT-LVL IV: ICD-10-PCS | Mod: PBBFAC,,, | Performed by: STUDENT IN AN ORGANIZED HEALTH CARE EDUCATION/TRAINING PROGRAM

## 2021-09-16 PROCEDURE — 99214 OFFICE O/P EST MOD 30 MIN: CPT | Mod: PBBFAC,PO | Performed by: STUDENT IN AN ORGANIZED HEALTH CARE EDUCATION/TRAINING PROGRAM

## 2021-09-16 PROCEDURE — 99999 PR PBB SHADOW E&M-EST. PATIENT-LVL IV: CPT | Mod: PBBFAC,,, | Performed by: STUDENT IN AN ORGANIZED HEALTH CARE EDUCATION/TRAINING PROGRAM

## 2021-09-16 PROCEDURE — 99213 PR OFFICE/OUTPT VISIT, EST, LEVL III, 20-29 MIN: ICD-10-PCS | Mod: S$PBB,,, | Performed by: STUDENT IN AN ORGANIZED HEALTH CARE EDUCATION/TRAINING PROGRAM

## 2021-09-16 PROCEDURE — 99213 OFFICE O/P EST LOW 20 MIN: CPT | Mod: S$PBB,,, | Performed by: STUDENT IN AN ORGANIZED HEALTH CARE EDUCATION/TRAINING PROGRAM

## 2021-09-16 RX ORDER — ESCITALOPRAM OXALATE 10 MG/1
10 TABLET ORAL DAILY
Qty: 30 TABLET | Refills: 11 | Status: SHIPPED | OUTPATIENT
Start: 2021-09-16 | End: 2022-05-17

## 2021-09-28 ENCOUNTER — OFFICE VISIT (OUTPATIENT)
Dept: ALLERGY | Facility: CLINIC | Age: 56
End: 2021-09-28
Payer: MEDICAID

## 2021-09-28 ENCOUNTER — PATIENT OUTREACH (OUTPATIENT)
Dept: ADMINISTRATIVE | Facility: OTHER | Age: 56
End: 2021-09-28

## 2021-09-28 VITALS — BODY MASS INDEX: 26.17 KG/M2 | HEIGHT: 62 IN | WEIGHT: 142.19 LBS

## 2021-09-28 DIAGNOSIS — L50.1 CHRONIC IDIOPATHIC URTICARIA: Primary | ICD-10-CM

## 2021-09-28 DIAGNOSIS — L50.9 URTICARIA: ICD-10-CM

## 2021-09-28 DIAGNOSIS — E06.3 HASHIMOTO'S THYROIDITIS: ICD-10-CM

## 2021-09-28 PROCEDURE — 99204 PR OFFICE/OUTPT VISIT, NEW, LEVL IV, 45-59 MIN: ICD-10-PCS | Mod: S$PBB,,, | Performed by: ALLERGY & IMMUNOLOGY

## 2021-09-28 PROCEDURE — 99204 OFFICE O/P NEW MOD 45 MIN: CPT | Mod: S$PBB,,, | Performed by: ALLERGY & IMMUNOLOGY

## 2021-09-28 PROCEDURE — 99999 PR PBB SHADOW E&M-EST. PATIENT-LVL III: CPT | Mod: PBBFAC,,, | Performed by: ALLERGY & IMMUNOLOGY

## 2021-09-28 PROCEDURE — 99999 PR PBB SHADOW E&M-EST. PATIENT-LVL III: ICD-10-PCS | Mod: PBBFAC,,, | Performed by: ALLERGY & IMMUNOLOGY

## 2021-09-28 PROCEDURE — 99213 OFFICE O/P EST LOW 20 MIN: CPT | Mod: PBBFAC,PO | Performed by: ALLERGY & IMMUNOLOGY

## 2021-10-04 ENCOUNTER — IMMUNIZATION (OUTPATIENT)
Dept: PRIMARY CARE CLINIC | Facility: CLINIC | Age: 56
End: 2021-10-04
Payer: MEDICAID

## 2021-10-04 DIAGNOSIS — Z23 NEED FOR VACCINATION: Primary | ICD-10-CM

## 2021-10-04 PROCEDURE — 0002A COVID-19, MRNA, LNP-S, PF, 30 MCG/0.3 ML DOSE VACCINE: ICD-10-PCS | Mod: S$GLB,,, | Performed by: FAMILY MEDICINE

## 2021-10-04 PROCEDURE — 91300 COVID-19, MRNA, LNP-S, PF, 30 MCG/0.3 ML DOSE VACCINE: CPT | Mod: S$GLB,,, | Performed by: FAMILY MEDICINE

## 2021-10-04 PROCEDURE — 0002A COVID-19, MRNA, LNP-S, PF, 30 MCG/0.3 ML DOSE VACCINE: CPT | Mod: S$GLB,,, | Performed by: FAMILY MEDICINE

## 2021-10-04 PROCEDURE — 91300 COVID-19, MRNA, LNP-S, PF, 30 MCG/0.3 ML DOSE VACCINE: ICD-10-PCS | Mod: S$GLB,,, | Performed by: FAMILY MEDICINE

## 2021-10-20 ENCOUNTER — LAB VISIT (OUTPATIENT)
Dept: LAB | Facility: HOSPITAL | Age: 56
End: 2021-10-20
Attending: STUDENT IN AN ORGANIZED HEALTH CARE EDUCATION/TRAINING PROGRAM
Payer: MEDICAID

## 2021-10-20 DIAGNOSIS — M89.9 BONE DISEASE: ICD-10-CM

## 2021-10-20 DIAGNOSIS — E06.3 CHRONIC LYMPHOCYTIC THYROIDITIS: ICD-10-CM

## 2021-10-20 DIAGNOSIS — E03.9 MYXEDEMA HEART DISEASE: Primary | ICD-10-CM

## 2021-10-20 DIAGNOSIS — I51.9 MYXEDEMA HEART DISEASE: Primary | ICD-10-CM

## 2021-10-20 DIAGNOSIS — E04.1 NONTOXIC UNINODULAR GOITER: ICD-10-CM

## 2021-10-20 LAB
25(OH)D3+25(OH)D2 SERPL-MCNC: 47 NG/ML (ref 30–96)
T3FREE SERPL-MCNC: 2.8 PG/ML (ref 2.3–4.2)
T4 FREE SERPL-MCNC: 0.92 NG/DL (ref 0.71–1.51)
TSH SERPL DL<=0.005 MIU/L-ACNC: 0.17 UIU/ML (ref 0.4–4)

## 2021-10-20 PROCEDURE — 84481 FREE ASSAY (FT-3): CPT | Performed by: INTERNAL MEDICINE

## 2021-10-20 PROCEDURE — 82306 VITAMIN D 25 HYDROXY: CPT | Performed by: INTERNAL MEDICINE

## 2021-10-20 PROCEDURE — 36415 COLL VENOUS BLD VENIPUNCTURE: CPT | Mod: PO | Performed by: INTERNAL MEDICINE

## 2021-10-20 PROCEDURE — 84439 ASSAY OF FREE THYROXINE: CPT | Performed by: INTERNAL MEDICINE

## 2021-10-20 PROCEDURE — 84443 ASSAY THYROID STIM HORMONE: CPT | Performed by: INTERNAL MEDICINE

## 2021-10-29 ENCOUNTER — LAB VISIT (OUTPATIENT)
Dept: LAB | Facility: HOSPITAL | Age: 56
End: 2021-10-29
Attending: INTERNAL MEDICINE
Payer: MEDICAID

## 2021-10-29 DIAGNOSIS — E03.9 MYXEDEMA HEART DISEASE: Primary | ICD-10-CM

## 2021-10-29 DIAGNOSIS — I51.9 MYXEDEMA HEART DISEASE: Primary | ICD-10-CM

## 2021-10-29 PROCEDURE — 84439 ASSAY OF FREE THYROXINE: CPT | Performed by: INTERNAL MEDICINE

## 2021-10-29 PROCEDURE — 36415 COLL VENOUS BLD VENIPUNCTURE: CPT | Mod: PO | Performed by: INTERNAL MEDICINE

## 2021-10-29 PROCEDURE — 84443 ASSAY THYROID STIM HORMONE: CPT | Performed by: INTERNAL MEDICINE

## 2021-10-30 LAB
T4 FREE SERPL-MCNC: 0.96 NG/DL (ref 0.71–1.51)
TSH SERPL DL<=0.005 MIU/L-ACNC: 0.13 UIU/ML (ref 0.4–4)

## 2021-12-02 ENCOUNTER — HOSPITAL ENCOUNTER (OUTPATIENT)
Dept: RADIOLOGY | Facility: HOSPITAL | Age: 56
Discharge: HOME | End: 2021-12-02
Attending: INTERNAL MEDICINE
Payer: MEDICAID

## 2021-12-02 DIAGNOSIS — E04.1 NONTOXIC UNINODULAR GOITER: ICD-10-CM

## 2021-12-02 PROCEDURE — 76536 US EXAM OF HEAD AND NECK: CPT | Mod: TC

## 2021-12-02 PROCEDURE — 76536 US EXAM OF HEAD AND NECK: CPT | Mod: 26,,, | Performed by: RADIOLOGY

## 2021-12-02 PROCEDURE — 76536 US THYROID: ICD-10-PCS | Mod: 26,,, | Performed by: RADIOLOGY

## 2021-12-31 RX ORDER — FLUTICASONE PROPIONATE 50 MCG
SPRAY, SUSPENSION (ML) NASAL
Qty: 48 G | Refills: 2 | Status: SHIPPED | OUTPATIENT
Start: 2021-12-31 | End: 2022-05-17 | Stop reason: SDUPTHER

## 2022-01-31 ENCOUNTER — LAB VISIT (OUTPATIENT)
Dept: LAB | Facility: HOSPITAL | Age: 57
End: 2022-01-31
Attending: INTERNAL MEDICINE
Payer: MEDICAID

## 2022-01-31 DIAGNOSIS — E06.3 CHRONIC LYMPHOCYTIC THYROIDITIS: ICD-10-CM

## 2022-01-31 DIAGNOSIS — E04.1 NONTOXIC UNINODULAR GOITER: ICD-10-CM

## 2022-01-31 DIAGNOSIS — I51.9 MYXEDEMA HEART DISEASE: Primary | ICD-10-CM

## 2022-01-31 DIAGNOSIS — E03.9 MYXEDEMA HEART DISEASE: Primary | ICD-10-CM

## 2022-01-31 LAB
T4 FREE SERPL-MCNC: 0.8 NG/DL (ref 0.71–1.51)
TSH SERPL DL<=0.005 MIU/L-ACNC: 6.12 UIU/ML (ref 0.4–4)

## 2022-01-31 PROCEDURE — 84439 ASSAY OF FREE THYROXINE: CPT | Performed by: INTERNAL MEDICINE

## 2022-01-31 PROCEDURE — 84481 FREE ASSAY (FT-3): CPT | Performed by: INTERNAL MEDICINE

## 2022-01-31 PROCEDURE — 36415 COLL VENOUS BLD VENIPUNCTURE: CPT | Performed by: INTERNAL MEDICINE

## 2022-01-31 PROCEDURE — 36415 COLL VENOUS BLD VENIPUNCTURE: CPT | Mod: PO | Performed by: INTERNAL MEDICINE

## 2022-01-31 PROCEDURE — 84443 ASSAY THYROID STIM HORMONE: CPT | Performed by: INTERNAL MEDICINE

## 2022-02-02 LAB — T3FREE SERPL-MCNC: 2.6 PG/ML (ref 2–4.4)

## 2022-03-31 ENCOUNTER — PATIENT OUTREACH (OUTPATIENT)
Dept: ADMINISTRATIVE | Facility: HOSPITAL | Age: 57
End: 2022-03-31
Payer: MEDICAID

## 2022-03-31 ENCOUNTER — LAB VISIT (OUTPATIENT)
Dept: LAB | Facility: HOSPITAL | Age: 57
End: 2022-03-31
Attending: INTERNAL MEDICINE
Payer: MEDICAID

## 2022-03-31 DIAGNOSIS — E03.9 PRIMARY HYPOTHYROIDISM: Primary | ICD-10-CM

## 2022-03-31 LAB
T4 FREE SERPL-MCNC: 0.99 NG/DL (ref 0.71–1.51)
TSH SERPL DL<=0.005 MIU/L-ACNC: 2.03 UIU/ML (ref 0.34–5.6)

## 2022-03-31 PROCEDURE — 84439 ASSAY OF FREE THYROXINE: CPT | Performed by: INTERNAL MEDICINE

## 2022-03-31 PROCEDURE — 84481 FREE ASSAY (FT-3): CPT | Performed by: INTERNAL MEDICINE

## 2022-03-31 PROCEDURE — 84443 ASSAY THYROID STIM HORMONE: CPT | Performed by: INTERNAL MEDICINE

## 2022-04-02 LAB — T3FREE SERPL-MCNC: 2.5 PG/ML (ref 2–4.4)

## 2022-05-06 ENCOUNTER — PATIENT MESSAGE (OUTPATIENT)
Dept: ADMINISTRATIVE | Facility: HOSPITAL | Age: 57
End: 2022-05-06
Payer: MEDICAID

## 2022-05-17 ENCOUNTER — LAB VISIT (OUTPATIENT)
Dept: LAB | Facility: HOSPITAL | Age: 57
End: 2022-05-17
Attending: STUDENT IN AN ORGANIZED HEALTH CARE EDUCATION/TRAINING PROGRAM
Payer: MEDICAID

## 2022-05-17 ENCOUNTER — OFFICE VISIT (OUTPATIENT)
Dept: FAMILY MEDICINE | Facility: CLINIC | Age: 57
End: 2022-05-17
Payer: MEDICAID

## 2022-05-17 VITALS
DIASTOLIC BLOOD PRESSURE: 70 MMHG | WEIGHT: 136.88 LBS | SYSTOLIC BLOOD PRESSURE: 100 MMHG | OXYGEN SATURATION: 97 % | HEIGHT: 62 IN | RESPIRATION RATE: 18 BRPM | BODY MASS INDEX: 25.19 KG/M2 | HEART RATE: 71 BPM

## 2022-05-17 DIAGNOSIS — L65.9 HAIR LOSS: ICD-10-CM

## 2022-05-17 DIAGNOSIS — F41.1 GENERALIZED ANXIETY DISORDER: ICD-10-CM

## 2022-05-17 DIAGNOSIS — R07.89 CHEST PRESSURE: Primary | ICD-10-CM

## 2022-05-17 DIAGNOSIS — R07.89 CHEST PRESSURE: ICD-10-CM

## 2022-05-17 DIAGNOSIS — G47.00 INSOMNIA, UNSPECIFIED TYPE: ICD-10-CM

## 2022-05-17 DIAGNOSIS — J31.0 RHINITIS, UNSPECIFIED TYPE: ICD-10-CM

## 2022-05-17 LAB
ALBUMIN SERPL BCP-MCNC: 3.8 G/DL (ref 3.5–5.2)
ALP SERPL-CCNC: 38 U/L (ref 55–135)
ALT SERPL W/O P-5'-P-CCNC: 13 U/L (ref 10–44)
ANION GAP SERPL CALC-SCNC: 8 MMOL/L (ref 8–16)
AST SERPL-CCNC: 17 U/L (ref 10–40)
BASOPHILS # BLD AUTO: 0.03 K/UL (ref 0–0.2)
BASOPHILS NFR BLD: 0.7 % (ref 0–1.9)
BILIRUB SERPL-MCNC: 0.5 MG/DL (ref 0.1–1)
BUN SERPL-MCNC: 7 MG/DL (ref 6–20)
CALCIUM SERPL-MCNC: 9.2 MG/DL (ref 8.7–10.5)
CHLORIDE SERPL-SCNC: 102 MMOL/L (ref 95–110)
CO2 SERPL-SCNC: 28 MMOL/L (ref 23–29)
CREAT SERPL-MCNC: 0.8 MG/DL (ref 0.5–1.4)
DIFFERENTIAL METHOD: ABNORMAL
EOSINOPHIL # BLD AUTO: 0.1 K/UL (ref 0–0.5)
EOSINOPHIL NFR BLD: 1.4 % (ref 0–8)
ERYTHROCYTE [DISTWIDTH] IN BLOOD BY AUTOMATED COUNT: 11.9 % (ref 11.5–14.5)
EST. GFR  (AFRICAN AMERICAN): >60 ML/MIN/1.73 M^2
EST. GFR  (NON AFRICAN AMERICAN): >60 ML/MIN/1.73 M^2
GLUCOSE SERPL-MCNC: 78 MG/DL (ref 70–110)
HCT VFR BLD AUTO: 41.9 % (ref 37–48.5)
HGB BLD-MCNC: 13.3 G/DL (ref 12–16)
IMM GRANULOCYTES # BLD AUTO: 0.01 K/UL (ref 0–0.04)
IMM GRANULOCYTES NFR BLD AUTO: 0.2 % (ref 0–0.5)
LYMPHOCYTES # BLD AUTO: 1.1 K/UL (ref 1–4.8)
LYMPHOCYTES NFR BLD: 25.4 % (ref 18–48)
MCH RBC QN AUTO: 29.8 PG (ref 27–31)
MCHC RBC AUTO-ENTMCNC: 31.7 G/DL (ref 32–36)
MCV RBC AUTO: 94 FL (ref 82–98)
MONOCYTES # BLD AUTO: 0.4 K/UL (ref 0.3–1)
MONOCYTES NFR BLD: 8.6 % (ref 4–15)
NEUTROPHILS # BLD AUTO: 2.7 K/UL (ref 1.8–7.7)
NEUTROPHILS NFR BLD: 63.7 % (ref 38–73)
NRBC BLD-RTO: 0 /100 WBC
PLATELET # BLD AUTO: 225 K/UL (ref 150–450)
PMV BLD AUTO: 11.8 FL (ref 9.2–12.9)
POTASSIUM SERPL-SCNC: 5 MMOL/L (ref 3.5–5.1)
PROT SERPL-MCNC: 6.6 G/DL (ref 6–8.4)
RBC # BLD AUTO: 4.47 M/UL (ref 4–5.4)
SODIUM SERPL-SCNC: 138 MMOL/L (ref 136–145)
TROPONIN I SERPL DL<=0.01 NG/ML-MCNC: <0.006 NG/ML (ref 0–0.03)
WBC # BLD AUTO: 4.17 K/UL (ref 3.9–12.7)

## 2022-05-17 PROCEDURE — 3074F PR MOST RECENT SYSTOLIC BLOOD PRESSURE < 130 MM HG: ICD-10-PCS | Mod: CPTII,,, | Performed by: STUDENT IN AN ORGANIZED HEALTH CARE EDUCATION/TRAINING PROGRAM

## 2022-05-17 PROCEDURE — 3008F BODY MASS INDEX DOCD: CPT | Mod: CPTII,,, | Performed by: STUDENT IN AN ORGANIZED HEALTH CARE EDUCATION/TRAINING PROGRAM

## 2022-05-17 PROCEDURE — 93010 EKG 12-LEAD: ICD-10-PCS | Mod: S$PBB,,, | Performed by: INTERNAL MEDICINE

## 2022-05-17 PROCEDURE — 83921 ORGANIC ACID SINGLE QUANT: CPT | Performed by: STUDENT IN AN ORGANIZED HEALTH CARE EDUCATION/TRAINING PROGRAM

## 2022-05-17 PROCEDURE — 1160F RVW MEDS BY RX/DR IN RCRD: CPT | Mod: CPTII,,, | Performed by: STUDENT IN AN ORGANIZED HEALTH CARE EDUCATION/TRAINING PROGRAM

## 2022-05-17 PROCEDURE — 99999 PR PBB SHADOW E&M-EST. PATIENT-LVL III: CPT | Mod: PBBFAC,,, | Performed by: STUDENT IN AN ORGANIZED HEALTH CARE EDUCATION/TRAINING PROGRAM

## 2022-05-17 PROCEDURE — 99214 OFFICE O/P EST MOD 30 MIN: CPT | Mod: S$PBB,,, | Performed by: STUDENT IN AN ORGANIZED HEALTH CARE EDUCATION/TRAINING PROGRAM

## 2022-05-17 PROCEDURE — 93005 ELECTROCARDIOGRAM TRACING: CPT | Mod: PBBFAC,PO | Performed by: INTERNAL MEDICINE

## 2022-05-17 PROCEDURE — 36415 COLL VENOUS BLD VENIPUNCTURE: CPT | Mod: PO | Performed by: STUDENT IN AN ORGANIZED HEALTH CARE EDUCATION/TRAINING PROGRAM

## 2022-05-17 PROCEDURE — 99214 PR OFFICE/OUTPT VISIT, EST, LEVL IV, 30-39 MIN: ICD-10-PCS | Mod: S$PBB,,, | Performed by: STUDENT IN AN ORGANIZED HEALTH CARE EDUCATION/TRAINING PROGRAM

## 2022-05-17 PROCEDURE — 1160F PR REVIEW ALL MEDS BY PRESCRIBER/CLIN PHARMACIST DOCUMENTED: ICD-10-PCS | Mod: CPTII,,, | Performed by: STUDENT IN AN ORGANIZED HEALTH CARE EDUCATION/TRAINING PROGRAM

## 2022-05-17 PROCEDURE — 93010 ELECTROCARDIOGRAM REPORT: CPT | Mod: S$PBB,,, | Performed by: INTERNAL MEDICINE

## 2022-05-17 PROCEDURE — 1159F PR MEDICATION LIST DOCUMENTED IN MEDICAL RECORD: ICD-10-PCS | Mod: CPTII,,, | Performed by: STUDENT IN AN ORGANIZED HEALTH CARE EDUCATION/TRAINING PROGRAM

## 2022-05-17 PROCEDURE — 3078F PR MOST RECENT DIASTOLIC BLOOD PRESSURE < 80 MM HG: ICD-10-PCS | Mod: CPTII,,, | Performed by: STUDENT IN AN ORGANIZED HEALTH CARE EDUCATION/TRAINING PROGRAM

## 2022-05-17 PROCEDURE — 84425 ASSAY OF VITAMIN B-1: CPT | Performed by: STUDENT IN AN ORGANIZED HEALTH CARE EDUCATION/TRAINING PROGRAM

## 2022-05-17 PROCEDURE — 84484 ASSAY OF TROPONIN QUANT: CPT | Performed by: STUDENT IN AN ORGANIZED HEALTH CARE EDUCATION/TRAINING PROGRAM

## 2022-05-17 PROCEDURE — 3074F SYST BP LT 130 MM HG: CPT | Mod: CPTII,,, | Performed by: STUDENT IN AN ORGANIZED HEALTH CARE EDUCATION/TRAINING PROGRAM

## 2022-05-17 PROCEDURE — 85025 COMPLETE CBC W/AUTO DIFF WBC: CPT | Performed by: STUDENT IN AN ORGANIZED HEALTH CARE EDUCATION/TRAINING PROGRAM

## 2022-05-17 PROCEDURE — 3008F PR BODY MASS INDEX (BMI) DOCUMENTED: ICD-10-PCS | Mod: CPTII,,, | Performed by: STUDENT IN AN ORGANIZED HEALTH CARE EDUCATION/TRAINING PROGRAM

## 2022-05-17 PROCEDURE — 3078F DIAST BP <80 MM HG: CPT | Mod: CPTII,,, | Performed by: STUDENT IN AN ORGANIZED HEALTH CARE EDUCATION/TRAINING PROGRAM

## 2022-05-17 PROCEDURE — 99999 PR PBB SHADOW E&M-EST. PATIENT-LVL III: ICD-10-PCS | Mod: PBBFAC,,, | Performed by: STUDENT IN AN ORGANIZED HEALTH CARE EDUCATION/TRAINING PROGRAM

## 2022-05-17 PROCEDURE — 80053 COMPREHEN METABOLIC PANEL: CPT | Performed by: STUDENT IN AN ORGANIZED HEALTH CARE EDUCATION/TRAINING PROGRAM

## 2022-05-17 PROCEDURE — 99213 OFFICE O/P EST LOW 20 MIN: CPT | Mod: PBBFAC,PO | Performed by: STUDENT IN AN ORGANIZED HEALTH CARE EDUCATION/TRAINING PROGRAM

## 2022-05-17 PROCEDURE — 1159F MED LIST DOCD IN RCRD: CPT | Mod: CPTII,,, | Performed by: STUDENT IN AN ORGANIZED HEALTH CARE EDUCATION/TRAINING PROGRAM

## 2022-05-17 RX ORDER — AMITRIPTYLINE HYDROCHLORIDE 25 MG/1
25 TABLET, FILM COATED ORAL NIGHTLY
Qty: 30 TABLET | Refills: 2 | Status: SHIPPED | OUTPATIENT
Start: 2022-05-17 | End: 2022-09-08

## 2022-05-17 RX ORDER — MINOXIDIL 2 %
SOLUTION, NON-ORAL TOPICAL 2 TIMES DAILY
Qty: 60 ML | Refills: 2 | Status: SHIPPED | OUTPATIENT
Start: 2022-05-17 | End: 2023-06-22 | Stop reason: DRUGHIGH

## 2022-05-17 RX ORDER — VALACYCLOVIR HYDROCHLORIDE 1 G/1
2000 TABLET, FILM COATED ORAL 2 TIMES DAILY
COMMUNITY
Start: 2022-04-23

## 2022-05-17 RX ORDER — FLUTICASONE PROPIONATE 50 MCG
1 SPRAY, SUSPENSION (ML) NASAL 2 TIMES DAILY
Qty: 48 G | Refills: 2 | Status: SHIPPED | OUTPATIENT
Start: 2022-05-17 | End: 2023-05-19

## 2022-05-17 RX ORDER — ESCITALOPRAM OXALATE 5 MG/1
TABLET ORAL
COMMUNITY
Start: 2022-01-27 | End: 2022-05-17 | Stop reason: DRUGHIGH

## 2022-05-17 NOTE — PROGRESS NOTES
[facility] MEDICINE CLINIC NOTE    Patient Name: Leobardo Rueda  YOB: 1965    PRESENTING HISTORY   Chief Complaint:   Chief Complaint   Patient presents with    Anxiety        History of Present Illness:  Ms. Leobardo Rueda is a 57 y.o. female      Insomnia- sleeps for a few hours, wakes up with anxiety, heart racing. Can rarely fall back to sleep without 1/2 xanax.    Feels anxious in her chest. Feels chest pressure and palpitations. Present at time of visit since overnight last night.     Anxiety- she thought was situational. Wedding at end of April for daughter. However, after wedding didn't improve. During wedding she got physically sick and vomited which she attributed to stress.    She was on 10mg lexapro. She stopped that at some point. Recently resumed at 5mg.    Her referral for psychotherapy was never approved by insurance, still listed as pending.     Thyroid replacement has been titrated recently by Dr. Ortiz.     She is also worried about hair loss.   Was taking biotin but her endocrinologist was concerned that was causing palpitations so she stopped.                                            Review of Systems   All other systems reviewed and are negative.        PAST HISTORY:     Past Medical History:   Diagnosis Date    Anxiety     Hypothyroidism        Past Surgical History:   Procedure Laterality Date    breast augmentation  10 years ago     SECTION      hemmorhiodectomy      tummy tuck      10 years ago       Family History   Problem Relation Age of Onset    Diabetes Mother     COPD Mother     Anxiety disorder Son     Colon cancer Paternal Grandfather        Social History     Socioeconomic History    Marital status:    Tobacco Use    Smoking status: Never Smoker    Smokeless tobacco: Never Used   Substance and Sexual Activity    Alcohol use: No    Drug use: No    Sexual activity: Yes     Partners: Male     Birth control/protection: None  "      MEDICATIONS & ALLERGIES:     Current Outpatient Medications on File Prior to Visit   Medication Sig    ALPRAZolam (XANAX) 0.5 MG tablet TAKE 1 TABLET BY MOUTH DAILY AS NEEDED FOR ANXIETY    EScitalopram oxalate (LEXAPRO) 5 MG Tab     fluticasone propionate (FLONASE) 50 mcg/actuation nasal spray SHAKE LIQUID AND USE 2 SPRAYS(100 MCG) IN EACH NOSTRIL TWICE DAILY    PREMARIN vaginal cream INSERT 1 GRAM VAGINALLY EVERY MONDAY, WEDNESDAY, FRIDAY    SYNTHROID 75 mcg tablet     valACYclovir (VALTREX) 1000 MG tablet Take 2,000 mg by mouth 2 (two) times daily.    VITAMIN D3 25 mcg (1,000 unit) tablet Take 1,000 Units by mouth once daily.    dexchlorphen-phenylephrine-DM 1-5-10 mg/5 mL Syrp Take 5 mLs by mouth every 4 (four) hours as needed. (Patient not taking: Reported on 5/17/2022)    EScitalopram oxalate (LEXAPRO) 10 MG tablet Take 1 tablet (10 mg total) by mouth once daily. (Patient not taking: Reported on 5/17/2022)    levothyroxine (SYNTHROID) 50 MCG tablet TAKE 1 TABLET(50 MCG) BY MOUTH EVERY DAY (Patient not taking: No sig reported)    levothyroxine (SYNTHROID) 50 MCG tablet Take 50 mcg by mouth before breakfast.     No current facility-administered medications on file prior to visit.       Review of patient's allergies indicates:  No Known Allergies    OBJECTIVE:   Vital Signs:  Vitals:    05/17/22 1018   BP: 100/70   Pulse: 71   Resp: 18   SpO2: 97%   Weight: 62.1 kg (136 lb 14.5 oz)   Height: 5' 2" (1.575 m)       No results found for this or any previous visit (from the past 24 hour(s)).      Physical Exam  Vitals and nursing note reviewed.   Constitutional:       General: She is not in acute distress.     Appearance: She is not toxic-appearing or diaphoretic.   HENT:      Head: Normocephalic and atraumatic.      Right Ear: External ear normal.      Left Ear: External ear normal.   Eyes:      General: No scleral icterus.     Conjunctiva/sclera: Conjunctivae normal.      Pupils: Pupils are equal, " round, and reactive to light.   Neck:      Thyroid: No thyromegaly.      Vascular: No carotid bruit.   Cardiovascular:      Rate and Rhythm: Normal rate and regular rhythm.      Heart sounds: Normal heart sounds. No murmur heard.  Pulmonary:      Effort: Pulmonary effort is normal. No respiratory distress.      Breath sounds: Normal breath sounds. No wheezing or rales.   Musculoskeletal:         General: No tenderness or deformity. Normal range of motion.      Cervical back: Normal range of motion and neck supple.   Lymphadenopathy:      Cervical: No cervical adenopathy.   Skin:     General: Skin is warm and dry.      Findings: No erythema or rash.   Neurological:      Mental Status: She is alert and oriented to person, place, and time.      Gait: Gait is intact.   Psychiatric:         Mood and Affect: Affect normal.         Cognition and Memory: Memory normal.         ASSESSMENT & PLAN:     Chest pressure  -     CBC Auto Differential; Future; Expected date: 05/17/2022  -     Comprehensive Metabolic Panel; Future; Expected date: 05/17/2022  -     Troponin I; Future; Expected date: 05/17/2022  -     IN OFFICE EKG 12-LEAD (to Muse)    Rhinitis, unspecified type  -     fluticasone propionate (FLONASE) 50 mcg/actuation nasal spray; 1 spray (50 mcg total) by Each Nostril route 2 (two) times daily.  Dispense: 48 g; Refill: 2  Refilling    Insomnia, unspecified type  -     amitriptyline (ELAVIL) 25 MG tablet; Take 1 tablet (25 mg total) by mouth every evening.  Dispense: 30 tablet; Refill: 2    Hair loss  -     Methylmalonic Acid, Serum; Future; Expected date: 05/17/2022  -     VITAMIN B1; Future; Expected date: 05/17/2022  -     minoxidiL (ROGAINE) 2 % external solution; Apply topically 2 (two) times daily.  Dispense: 60 mL; Refill: 2    Generalized anxiety disorder  -     amitriptyline (ELAVIL) 25 MG tablet; Take 1 tablet (25 mg total) by mouth every evening.  Dispense: 30 tablet; Refill: 2        Clint Mejía MD    Internal Medicine    This note was created using Dragon voice recognition software that occasionally misinterprets phrases or words.

## 2022-05-19 ENCOUNTER — PATIENT MESSAGE (OUTPATIENT)
Dept: FAMILY MEDICINE | Facility: CLINIC | Age: 57
End: 2022-05-19
Payer: MEDICAID

## 2022-05-19 DIAGNOSIS — F41.1 GENERALIZED ANXIETY DISORDER: Primary | ICD-10-CM

## 2022-05-20 RX ORDER — ESCITALOPRAM OXALATE 10 MG/1
10 TABLET ORAL DAILY
Qty: 30 TABLET | Refills: 11 | Status: SHIPPED | OUTPATIENT
Start: 2022-05-20 | End: 2023-05-23 | Stop reason: DRUGHIGH

## 2022-05-21 ENCOUNTER — PATIENT MESSAGE (OUTPATIENT)
Dept: FAMILY MEDICINE | Facility: CLINIC | Age: 57
End: 2022-05-21
Payer: MEDICAID

## 2022-05-23 ENCOUNTER — PATIENT MESSAGE (OUTPATIENT)
Dept: ADMINISTRATIVE | Facility: HOSPITAL | Age: 57
End: 2022-05-23
Payer: MEDICAID

## 2022-05-23 ENCOUNTER — PATIENT OUTREACH (OUTPATIENT)
Dept: ADMINISTRATIVE | Facility: HOSPITAL | Age: 57
End: 2022-05-23
Payer: MEDICAID

## 2022-05-23 LAB — VIT B1 BLD-MCNC: 83 UG/L (ref 38–122)

## 2022-05-24 ENCOUNTER — PATIENT MESSAGE (OUTPATIENT)
Dept: FAMILY MEDICINE | Facility: CLINIC | Age: 57
End: 2022-05-24
Payer: MEDICAID

## 2022-05-24 LAB — METHYLMALONATE SERPL-SCNC: 0.62 UMOL/L

## 2022-05-26 ENCOUNTER — HOSPITAL ENCOUNTER (OUTPATIENT)
Dept: RADIOLOGY | Facility: HOSPITAL | Age: 57
Discharge: HOME | End: 2022-05-26
Attending: STUDENT IN AN ORGANIZED HEALTH CARE EDUCATION/TRAINING PROGRAM
Payer: MEDICAID

## 2022-05-26 VITALS — HEIGHT: 62 IN | WEIGHT: 136.88 LBS | BODY MASS INDEX: 25.19 KG/M2

## 2022-05-26 DIAGNOSIS — Z00.00 ROUTINE GENERAL MEDICAL EXAMINATION AT A HEALTH CARE FACILITY: ICD-10-CM

## 2022-05-26 PROCEDURE — 77063 BREAST TOMOSYNTHESIS BI: CPT | Mod: TC,PO

## 2022-05-26 PROCEDURE — 77067 SCR MAMMO BI INCL CAD: CPT | Mod: TC,PO

## 2022-06-14 ENCOUNTER — PATIENT MESSAGE (OUTPATIENT)
Dept: FAMILY MEDICINE | Facility: CLINIC | Age: 57
End: 2022-06-14
Payer: MEDICAID

## 2022-06-15 ENCOUNTER — PATIENT MESSAGE (OUTPATIENT)
Dept: FAMILY MEDICINE | Facility: CLINIC | Age: 57
End: 2022-06-15
Payer: MEDICAID

## 2022-06-15 ENCOUNTER — TELEPHONE (OUTPATIENT)
Dept: FAMILY MEDICINE | Facility: CLINIC | Age: 57
End: 2022-06-15
Payer: MEDICAID

## 2022-06-15 NOTE — TELEPHONE ENCOUNTER
I spoke with pt via phone. Virtual visit rescheduled all understanding voiced.      ----- Message from Steffany Diaz sent at 6/15/2022 12:06 PM CDT -----  Patient Call Back    Who Called: PT     What is the request in detail: pt calling to speak with someone regarding her virtual appointment. The pt stated that she is having trouble logging into her virtual. Pls advise.    Can the clinic reply by [facility]?    Best Call Back Number: [phone]

## 2022-07-27 DIAGNOSIS — Z12.11 COLON CANCER SCREENING: ICD-10-CM

## 2022-08-01 ENCOUNTER — PATIENT MESSAGE (OUTPATIENT)
Dept: ADMINISTRATIVE | Facility: HOSPITAL | Age: 57
End: 2022-08-01
Payer: MEDICAID

## 2022-09-08 ENCOUNTER — TELEPHONE (OUTPATIENT)
Dept: FAMILY MEDICINE | Facility: CLINIC | Age: 57
End: 2022-09-08
Payer: MEDICAID

## 2022-09-08 ENCOUNTER — OFFICE VISIT (OUTPATIENT)
Dept: FAMILY MEDICINE | Facility: CLINIC | Age: 57
End: 2022-09-08
Payer: MEDICAID

## 2022-09-08 VITALS
HEART RATE: 73 BPM | TEMPERATURE: 99 F | DIASTOLIC BLOOD PRESSURE: 60 MMHG | WEIGHT: 141.13 LBS | HEIGHT: 62 IN | SYSTOLIC BLOOD PRESSURE: 126 MMHG | OXYGEN SATURATION: 96 % | BODY MASS INDEX: 25.97 KG/M2

## 2022-09-08 DIAGNOSIS — S39.012A STRAIN OF LUMBAR PARASPINOUS MUSCLE, INITIAL ENCOUNTER: Primary | ICD-10-CM

## 2022-09-08 PROCEDURE — 1159F PR MEDICATION LIST DOCUMENTED IN MEDICAL RECORD: ICD-10-PCS | Mod: CPTII,,, | Performed by: FAMILY MEDICINE

## 2022-09-08 PROCEDURE — 3074F SYST BP LT 130 MM HG: CPT | Mod: CPTII,,, | Performed by: FAMILY MEDICINE

## 2022-09-08 PROCEDURE — 99999 PR PBB SHADOW E&M-EST. PATIENT-LVL III: ICD-10-PCS | Mod: PBBFAC,,, | Performed by: FAMILY MEDICINE

## 2022-09-08 PROCEDURE — 99213 OFFICE O/P EST LOW 20 MIN: CPT | Mod: PBBFAC,PO | Performed by: FAMILY MEDICINE

## 2022-09-08 PROCEDURE — 99999 PR PBB SHADOW E&M-EST. PATIENT-LVL III: CPT | Mod: PBBFAC,,, | Performed by: FAMILY MEDICINE

## 2022-09-08 PROCEDURE — 1159F MED LIST DOCD IN RCRD: CPT | Mod: CPTII,,, | Performed by: FAMILY MEDICINE

## 2022-09-08 PROCEDURE — 3078F DIAST BP <80 MM HG: CPT | Mod: CPTII,,, | Performed by: FAMILY MEDICINE

## 2022-09-08 PROCEDURE — 3008F PR BODY MASS INDEX (BMI) DOCUMENTED: ICD-10-PCS | Mod: CPTII,,, | Performed by: FAMILY MEDICINE

## 2022-09-08 PROCEDURE — 99213 PR OFFICE/OUTPT VISIT, EST, LEVL III, 20-29 MIN: ICD-10-PCS | Mod: S$PBB,,, | Performed by: FAMILY MEDICINE

## 2022-09-08 PROCEDURE — 3008F BODY MASS INDEX DOCD: CPT | Mod: CPTII,,, | Performed by: FAMILY MEDICINE

## 2022-09-08 PROCEDURE — 1160F RVW MEDS BY RX/DR IN RCRD: CPT | Mod: CPTII,,, | Performed by: FAMILY MEDICINE

## 2022-09-08 PROCEDURE — 3074F PR MOST RECENT SYSTOLIC BLOOD PRESSURE < 130 MM HG: ICD-10-PCS | Mod: CPTII,,, | Performed by: FAMILY MEDICINE

## 2022-09-08 PROCEDURE — 3078F PR MOST RECENT DIASTOLIC BLOOD PRESSURE < 80 MM HG: ICD-10-PCS | Mod: CPTII,,, | Performed by: FAMILY MEDICINE

## 2022-09-08 PROCEDURE — 1160F PR REVIEW ALL MEDS BY PRESCRIBER/CLIN PHARMACIST DOCUMENTED: ICD-10-PCS | Mod: CPTII,,, | Performed by: FAMILY MEDICINE

## 2022-09-08 PROCEDURE — 99213 OFFICE O/P EST LOW 20 MIN: CPT | Mod: S$PBB,,, | Performed by: FAMILY MEDICINE

## 2022-09-08 RX ORDER — CYCLOBENZAPRINE HCL 5 MG
5 TABLET ORAL 3 TIMES DAILY PRN
Qty: 30 TABLET | Refills: 0 | Status: SHIPPED | OUTPATIENT
Start: 2022-09-08 | End: 2022-09-18

## 2022-09-08 NOTE — PATIENT INSTRUCTIONS
Ibuprofen 600 mg three times a day.    Can add Tylenol anytime.      Moist heat to the area 3 times a day.

## 2022-09-08 NOTE — TELEPHONE ENCOUNTER
I spoke with pt via phone. Will see Dr. Cuevas today at 2:00. All understanding voiced of date and time.     ----- Message from Lori Cueva sent at 9/8/2022  8:14 AM CDT -----  Contact: patient  Type:  Patient Call          Who Called:patient         Does the patient know what this is regarding?: requesting a same day appt ;pt states she has back pain that has been going on a few days ;please advise           Would the patient rather a call back or a response via [facility]? Call           Best Call Back Number: [phone]             Additional Information:

## 2022-09-08 NOTE — PROGRESS NOTES
Subjective:       Patient ID: Leobardo Rueda is a 57 y.o. female.    Chief Complaint: Hip Pain (left)    New to me patient here for UC visit.  Onset 2 days ago after doing heavy yard work of pain in left very low back.  She describes leaning over from a raised garden to a lower level and pulling onto firmly rooted vegetation.   Some jolts or spasms of pain with turning/moving.  No radiation into leg.    Review of Systems   Constitutional:  Negative for fever.   Respiratory:  Negative for shortness of breath.    Cardiovascular:  Negative for chest pain.   Gastrointestinal:  Negative for abdominal pain and nausea.   Skin:  Negative for rash.   All other systems reviewed and are negative.    Objective:      Physical Exam  Constitutional:       General: She is not in acute distress.     Appearance: She is well-developed.   Cardiovascular:      Rate and Rhythm: Normal rate and regular rhythm.      Heart sounds: No murmur heard.  Pulmonary:      Effort: Pulmonary effort is normal.      Breath sounds: Normal breath sounds.   Musculoskeletal:      Lumbar back: No tenderness or bony tenderness. Negative right straight leg raise test and negative left straight leg raise test.        Back:    Neurological:      Sensory: Sensation is intact.      Motor: Motor function is intact.      Comments: No numbness of feet or toes reported.  [facility] motor function is full and equal b/l.       Assessment:       1. Strain of lumbar paraspinous muscle, initial encounter        Plan:       Strain of lumbar paraspinous muscle, initial encounter  -     cyclobenzaprine (FLEXERIL) 5 MG tablet; Take 1 tablet (5 mg total) by mouth 3 (three) times daily as needed for Muscle spasms.  Dispense: 30 tablet; Refill: 0    Patient Instructions   Ibuprofen 600 mg three times a day.    Can add Tylenol anytime.      Moist heat to the area 3 times a day.

## 2022-10-03 ENCOUNTER — LAB VISIT (OUTPATIENT)
Dept: LAB | Facility: HOSPITAL | Age: 57
End: 2022-10-03
Attending: INTERNAL MEDICINE
Payer: MEDICAID

## 2022-10-03 DIAGNOSIS — E03.9 PRIMARY HYPOTHYROIDISM: Primary | ICD-10-CM

## 2022-10-03 LAB
T4 FREE SERPL-MCNC: 0.86 NG/DL (ref 0.71–1.51)
TSH SERPL DL<=0.005 MIU/L-ACNC: 1.96 UIU/ML (ref 0.34–5.6)

## 2022-10-03 PROCEDURE — 84439 ASSAY OF FREE THYROXINE: CPT | Performed by: INTERNAL MEDICINE

## 2022-10-03 PROCEDURE — 36415 COLL VENOUS BLD VENIPUNCTURE: CPT | Performed by: INTERNAL MEDICINE

## 2022-10-03 PROCEDURE — 84443 ASSAY THYROID STIM HORMONE: CPT | Performed by: INTERNAL MEDICINE

## 2022-11-03 ENCOUNTER — PATIENT MESSAGE (OUTPATIENT)
Dept: ADMINISTRATIVE | Facility: HOSPITAL | Age: 57
End: 2022-11-03
Payer: MEDICAID

## 2022-12-06 ENCOUNTER — PATIENT MESSAGE (OUTPATIENT)
Dept: ADMINISTRATIVE | Facility: HOSPITAL | Age: 57
End: 2022-12-06
Payer: MEDICAID

## 2022-12-30 ENCOUNTER — PATIENT MESSAGE (OUTPATIENT)
Dept: ADMINISTRATIVE | Facility: HOSPITAL | Age: 57
End: 2022-12-30
Payer: MEDICAID

## 2023-04-11 ENCOUNTER — PATIENT MESSAGE (OUTPATIENT)
Dept: ADMINISTRATIVE | Facility: HOSPITAL | Age: 58
End: 2023-04-11
Payer: MEDICAID

## 2023-05-07 ENCOUNTER — PATIENT MESSAGE (OUTPATIENT)
Dept: ADMINISTRATIVE | Facility: HOSPITAL | Age: 58
End: 2023-05-07
Payer: MEDICAID

## 2023-05-10 ENCOUNTER — LAB VISIT (OUTPATIENT)
Dept: LAB | Facility: HOSPITAL | Age: 58
End: 2023-05-10
Attending: INTERNAL MEDICINE
Payer: MEDICAID

## 2023-05-10 DIAGNOSIS — M85.89 OTHER SPECIFIED DISORDERS OF BONE DENSITY AND STRUCTURE, MULTIPLE SITES: ICD-10-CM

## 2023-05-10 DIAGNOSIS — E03.9 MYXEDEMA HEART DISEASE: Primary | ICD-10-CM

## 2023-05-10 DIAGNOSIS — Z79.899 ENCOUNTER FOR LONG-TERM (CURRENT) USE OF OTHER MEDICATIONS: ICD-10-CM

## 2023-05-10 DIAGNOSIS — I51.9 MYXEDEMA HEART DISEASE: Primary | ICD-10-CM

## 2023-05-10 DIAGNOSIS — E04.2 NONTOXIC MULTINODULAR GOITER: Primary | ICD-10-CM

## 2023-05-10 LAB
25(OH)D3+25(OH)D2 SERPL-MCNC: 61 NG/ML (ref 30–96)
ANION GAP SERPL CALC-SCNC: 6 MMOL/L (ref 8–16)
BUN SERPL-MCNC: 8 MG/DL (ref 6–20)
CALCIUM SERPL-MCNC: 9.3 MG/DL (ref 8.7–10.5)
CHLORIDE SERPL-SCNC: 104 MMOL/L (ref 95–110)
CO2 SERPL-SCNC: 28 MMOL/L (ref 23–29)
CREAT SERPL-MCNC: 0.7 MG/DL (ref 0.5–1.4)
EST. GFR  (NO RACE VARIABLE): >60 ML/MIN/1.73 M^2
GLUCOSE SERPL-MCNC: 76 MG/DL (ref 70–110)
POTASSIUM SERPL-SCNC: 4.3 MMOL/L (ref 3.5–5.1)
SODIUM SERPL-SCNC: 138 MMOL/L (ref 136–145)
T4 FREE SERPL-MCNC: 0.87 NG/DL (ref 0.71–1.51)
TSH SERPL DL<=0.005 MIU/L-ACNC: 1.67 UIU/ML (ref 0.34–5.6)

## 2023-05-10 PROCEDURE — 80048 BASIC METABOLIC PNL TOTAL CA: CPT | Performed by: INTERNAL MEDICINE

## 2023-05-10 PROCEDURE — 84439 ASSAY OF FREE THYROXINE: CPT | Performed by: INTERNAL MEDICINE

## 2023-05-10 PROCEDURE — 36415 COLL VENOUS BLD VENIPUNCTURE: CPT | Performed by: INTERNAL MEDICINE

## 2023-05-10 PROCEDURE — 84443 ASSAY THYROID STIM HORMONE: CPT | Performed by: INTERNAL MEDICINE

## 2023-05-10 PROCEDURE — 82306 VITAMIN D 25 HYDROXY: CPT | Performed by: INTERNAL MEDICINE

## 2023-05-16 ENCOUNTER — TELEPHONE (OUTPATIENT)
Dept: OBSTETRICS AND GYNECOLOGY | Facility: CLINIC | Age: 58
End: 2023-05-16

## 2023-05-16 ENCOUNTER — CLINICAL SUPPORT (OUTPATIENT)
Dept: OBSTETRICS AND GYNECOLOGY | Facility: CLINIC | Age: 58
End: 2023-05-16
Payer: MEDICAID

## 2023-05-16 ENCOUNTER — OFFICE VISIT (OUTPATIENT)
Dept: OBSTETRICS AND GYNECOLOGY | Facility: CLINIC | Age: 58
End: 2023-05-16
Payer: MEDICAID

## 2023-05-16 ENCOUNTER — LAB VISIT (OUTPATIENT)
Dept: LAB | Facility: OTHER | Age: 58
End: 2023-05-16
Attending: OBSTETRICS & GYNECOLOGY
Payer: MEDICAID

## 2023-05-16 VITALS
WEIGHT: 137.38 LBS | SYSTOLIC BLOOD PRESSURE: 126 MMHG | HEIGHT: 62 IN | BODY MASS INDEX: 25.28 KG/M2 | DIASTOLIC BLOOD PRESSURE: 74 MMHG

## 2023-05-16 DIAGNOSIS — N95.1 SYMPTOMATIC MENOPAUSAL OR FEMALE CLIMACTERIC STATES: Primary | ICD-10-CM

## 2023-05-16 DIAGNOSIS — N95.1 SYMPTOMATIC MENOPAUSAL OR FEMALE CLIMACTERIC STATES: ICD-10-CM

## 2023-05-16 LAB — ESTRADIOL SERPL-MCNC: <10 PG/ML

## 2023-05-16 PROCEDURE — 3078F PR MOST RECENT DIASTOLIC BLOOD PRESSURE < 80 MM HG: ICD-10-PCS | Mod: CPTII,,, | Performed by: OBSTETRICS & GYNECOLOGY

## 2023-05-16 PROCEDURE — 3008F BODY MASS INDEX DOCD: CPT | Mod: CPTII,,, | Performed by: OBSTETRICS & GYNECOLOGY

## 2023-05-16 PROCEDURE — 3078F DIAST BP <80 MM HG: CPT | Mod: CPTII,,, | Performed by: OBSTETRICS & GYNECOLOGY

## 2023-05-16 PROCEDURE — 3074F SYST BP LT 130 MM HG: CPT | Mod: CPTII,,, | Performed by: OBSTETRICS & GYNECOLOGY

## 2023-05-16 PROCEDURE — 36415 COLL VENOUS BLD VENIPUNCTURE: CPT | Performed by: OBSTETRICS & GYNECOLOGY

## 2023-05-16 PROCEDURE — 99214 OFFICE O/P EST MOD 30 MIN: CPT | Mod: S$PBB,,, | Performed by: OBSTETRICS & GYNECOLOGY

## 2023-05-16 PROCEDURE — 99214 PR OFFICE/OUTPT VISIT, EST, LEVL IV, 30-39 MIN: ICD-10-PCS | Mod: S$PBB,,, | Performed by: OBSTETRICS & GYNECOLOGY

## 2023-05-16 PROCEDURE — 99999 PR PBB SHADOW E&M-EST. PATIENT-LVL III: CPT | Mod: PBBFAC,,, | Performed by: OBSTETRICS & GYNECOLOGY

## 2023-05-16 PROCEDURE — 1159F MED LIST DOCD IN RCRD: CPT | Mod: CPTII,,, | Performed by: OBSTETRICS & GYNECOLOGY

## 2023-05-16 PROCEDURE — 84402 ASSAY OF FREE TESTOSTERONE: CPT | Performed by: OBSTETRICS & GYNECOLOGY

## 2023-05-16 PROCEDURE — 99999 PR PBB SHADOW E&M-EST. PATIENT-LVL III: ICD-10-PCS | Mod: PBBFAC,,, | Performed by: OBSTETRICS & GYNECOLOGY

## 2023-05-16 PROCEDURE — 82670 ASSAY OF TOTAL ESTRADIOL: CPT | Performed by: OBSTETRICS & GYNECOLOGY

## 2023-05-16 PROCEDURE — 99213 OFFICE O/P EST LOW 20 MIN: CPT | Mod: PBBFAC | Performed by: OBSTETRICS & GYNECOLOGY

## 2023-05-16 PROCEDURE — 3074F PR MOST RECENT SYSTOLIC BLOOD PRESSURE < 130 MM HG: ICD-10-PCS | Mod: CPTII,,, | Performed by: OBSTETRICS & GYNECOLOGY

## 2023-05-16 PROCEDURE — 1159F PR MEDICATION LIST DOCUMENTED IN MEDICAL RECORD: ICD-10-PCS | Mod: CPTII,,, | Performed by: OBSTETRICS & GYNECOLOGY

## 2023-05-16 PROCEDURE — 3008F PR BODY MASS INDEX (BMI) DOCUMENTED: ICD-10-PCS | Mod: CPTII,,, | Performed by: OBSTETRICS & GYNECOLOGY

## 2023-05-16 PROCEDURE — 96372 THER/PROPH/DIAG INJ SC/IM: CPT | Mod: PBBFAC

## 2023-05-16 RX ORDER — TESTOSTERONE CYPIONATE 200 MG/ML
50 INJECTION, SOLUTION INTRAMUSCULAR
Status: COMPLETED | OUTPATIENT
Start: 2023-05-16 | End: 2023-05-16

## 2023-05-16 RX ADMIN — TESTOSTERONE CYPIONATE 50 MG: 200 INJECTION, SOLUTION INTRAMUSCULAR at 12:05

## 2023-05-16 NOTE — PROGRESS NOTES
Here for hormone therapy injection, no complaints at this time. Injection given as ordered, tolerated well no reports of pain prior to or post injection. Advised to return to clinic as scheduled      Site:rb    Testerone:50 mg    CLINIC SUPPLIED MEDICATION

## 2023-05-19 ENCOUNTER — HOSPITAL ENCOUNTER (OUTPATIENT)
Dept: RADIOLOGY | Facility: HOSPITAL | Age: 58
Discharge: HOME | End: 2023-05-19
Attending: INTERNAL MEDICINE
Payer: MEDICAID

## 2023-05-19 DIAGNOSIS — J31.0 RHINITIS, UNSPECIFIED TYPE: ICD-10-CM

## 2023-05-19 DIAGNOSIS — M85.89 OTHER SPECIFIED DISORDERS OF BONE DENSITY AND STRUCTURE, MULTIPLE SITES: ICD-10-CM

## 2023-05-19 DIAGNOSIS — E04.2 NONTOXIC MULTINODULAR GOITER: ICD-10-CM

## 2023-05-19 LAB — TESTOST FREE SERPL-MCNC: <0.4 PG/ML

## 2023-05-19 PROCEDURE — 77080 DXA BONE DENSITY AXIAL: CPT | Mod: TC,PO

## 2023-05-19 PROCEDURE — 76536 US EXAM OF HEAD AND NECK: CPT | Mod: TC,PO

## 2023-05-19 RX ORDER — FLUTICASONE PROPIONATE 50 MCG
SPRAY, SUSPENSION (ML) NASAL
Qty: 48 G | Refills: 0 | Status: SHIPPED | OUTPATIENT
Start: 2023-05-19 | End: 2024-01-02

## 2023-05-19 NOTE — TELEPHONE ENCOUNTER
No care due was identified.  [facility] Embedded Care Due Messages. Reference number: [id number].   5/19/2023 5:53:11 AM CDT

## 2023-05-19 NOTE — TELEPHONE ENCOUNTER
Refill Decision Note   Leobardo Rueda  is requesting a refill authorization.  Brief Assessment and Rationale for Refill:  Approve     Medication Therapy Plan:         Comments:     Note composed:9:05 AM 05/19/2023

## 2023-05-23 ENCOUNTER — PATIENT MESSAGE (OUTPATIENT)
Dept: FAMILY MEDICINE | Facility: CLINIC | Age: 58
End: 2023-05-23
Payer: MEDICAID

## 2023-05-23 DIAGNOSIS — F41.9 ANXIETY: Primary | ICD-10-CM

## 2023-05-23 RX ORDER — ESCITALOPRAM OXALATE 5 MG/1
5 TABLET ORAL DAILY
Qty: 90 TABLET | Refills: 4 | Status: SHIPPED | OUTPATIENT
Start: 2023-05-23 | End: 2023-06-22 | Stop reason: SDUPTHER

## 2023-05-24 ENCOUNTER — PATIENT MESSAGE (OUTPATIENT)
Dept: FAMILY MEDICINE | Facility: CLINIC | Age: 58
End: 2023-05-24
Payer: MEDICAID

## 2023-05-25 NOTE — TELEPHONE ENCOUNTER
Per Dr. Mejía,  Have her come in before hours one day and double book.     Call placed to patient. No answer received. Left message requesting return call to office. Sent portal message to patient for notification.

## 2023-05-25 NOTE — TELEPHONE ENCOUNTER
Please see attached portal message from patient.     First available appointment noted with Dr. Mejía is in November 2023.  Please advise if appointment can be performed with OVIDIO Moscoso. Thank you.

## 2023-06-06 ENCOUNTER — LAB VISIT (OUTPATIENT)
Dept: LAB | Facility: HOSPITAL | Age: 58
End: 2023-06-06
Attending: OBSTETRICS & GYNECOLOGY
Payer: MEDICAID

## 2023-06-06 DIAGNOSIS — N95.1 SYMPTOMATIC MENOPAUSAL OR FEMALE CLIMACTERIC STATES: ICD-10-CM

## 2023-06-06 LAB — ESTRADIOL SERPL-MCNC: <10 PG/ML

## 2023-06-06 PROCEDURE — 82670 ASSAY OF TOTAL ESTRADIOL: CPT | Performed by: OBSTETRICS & GYNECOLOGY

## 2023-06-06 PROCEDURE — 84402 ASSAY OF FREE TESTOSTERONE: CPT | Performed by: OBSTETRICS & GYNECOLOGY

## 2023-06-07 DIAGNOSIS — Z12.31 OTHER SCREENING MAMMOGRAM: ICD-10-CM

## 2023-06-12 ENCOUNTER — PATIENT MESSAGE (OUTPATIENT)
Dept: ADMINISTRATIVE | Facility: HOSPITAL | Age: 58
End: 2023-06-12
Payer: MEDICAID

## 2023-06-12 LAB — TESTOST FREE SERPL-MCNC: <0.4 PG/ML

## 2023-06-13 ENCOUNTER — CLINICAL SUPPORT (OUTPATIENT)
Dept: OBSTETRICS AND GYNECOLOGY | Facility: CLINIC | Age: 58
End: 2023-06-13
Payer: MEDICAID

## 2023-06-13 DIAGNOSIS — N95.1 SYMPTOMATIC MENOPAUSAL OR FEMALE CLIMACTERIC STATES: Primary | ICD-10-CM

## 2023-06-13 PROCEDURE — 96372 THER/PROPH/DIAG INJ SC/IM: CPT | Mod: PBBFAC

## 2023-06-13 RX ORDER — TESTOSTERONE CYPIONATE 200 MG/ML
76 INJECTION, SOLUTION INTRAMUSCULAR
Status: COMPLETED | OUTPATIENT
Start: 2023-06-13 | End: 2023-08-04

## 2023-06-13 RX ADMIN — TESTOSTERONE CYPIONATE 76 MG: 200 INJECTION, SOLUTION INTRAMUSCULAR at 09:06

## 2023-06-13 NOTE — PROGRESS NOTES
Here for hormone therapy injection, no complaints at this time. Injection given as ordered, tolerated well no reports of pain prior to or post injection. Advised to return to clinic as scheduled      Site:[facility]    Testerone:76 mg    CLINIC SUPPLIED MEDICATION

## 2023-06-15 ENCOUNTER — TELEPHONE (OUTPATIENT)
Dept: FAMILY MEDICINE | Facility: CLINIC | Age: 58
End: 2023-06-15
Payer: MEDICAID

## 2023-06-15 NOTE — TELEPHONE ENCOUNTER
Patient reports she is returning call to office regarding refill request for Xanax. Upon further assessment it was noted Dr. Mejía advised patient would need an appointment related to refill of this medication. The following message was noted per Dr. Mejía on 5-25-23:    Clint Mejía MD  to Me         8:50 AM  Have her come in before hours one day and double book.     Call was placed to patient on 5-25-23, no answer. Portal message was sent to patient at that time. This was fully explained to patient who verbalized understanding. Patient agreed to appointment on 6-23-23 at 7:30 am.

## 2023-06-15 NOTE — TELEPHONE ENCOUNTER
----- Message from Estelle Harrell sent at 6/15/2023 11:34 AM CDT -----  Type:  Patient Returning Call    Who Called:  pt   Who Left Message for Patient:  not sure   Does the patient know what this is regarding?:  no   Best Call Back Number:  [phone]    Additional Information:  please advise

## 2023-06-22 ENCOUNTER — OFFICE VISIT (OUTPATIENT)
Dept: FAMILY MEDICINE | Facility: CLINIC | Age: 58
End: 2023-06-22
Payer: MEDICAID

## 2023-06-22 ENCOUNTER — LAB VISIT (OUTPATIENT)
Dept: LAB | Facility: HOSPITAL | Age: 58
End: 2023-06-22
Attending: STUDENT IN AN ORGANIZED HEALTH CARE EDUCATION/TRAINING PROGRAM
Payer: MEDICAID

## 2023-06-22 VITALS
HEIGHT: 62 IN | BODY MASS INDEX: 25.12 KG/M2 | DIASTOLIC BLOOD PRESSURE: 86 MMHG | HEART RATE: 78 BPM | OXYGEN SATURATION: 95 % | SYSTOLIC BLOOD PRESSURE: 130 MMHG | RESPIRATION RATE: 18 BRPM

## 2023-06-22 DIAGNOSIS — Z00.00 ROUTINE GENERAL MEDICAL EXAMINATION AT A HEALTH CARE FACILITY: ICD-10-CM

## 2023-06-22 DIAGNOSIS — Z00.00 ROUTINE GENERAL MEDICAL EXAMINATION AT A HEALTH CARE FACILITY: Primary | ICD-10-CM

## 2023-06-22 DIAGNOSIS — Z12.11 ENCOUNTER FOR SCREENING FOR MALIGNANT NEOPLASM OF COLON: ICD-10-CM

## 2023-06-22 DIAGNOSIS — F41.9 ANXIETY: ICD-10-CM

## 2023-06-22 LAB
CHOLEST SERPL-MCNC: 247 MG/DL (ref 120–199)
CHOLEST/HDLC SERPL: 4 {RATIO} (ref 2–5)
HDLC SERPL-MCNC: 61 MG/DL (ref 40–75)
HDLC SERPL: 24.7 % (ref 20–50)
LDLC SERPL CALC-MCNC: 166 MG/DL (ref 63–159)
NONHDLC SERPL-MCNC: 186 MG/DL
TRIGL SERPL-MCNC: 100 MG/DL (ref 30–150)

## 2023-06-22 PROCEDURE — 3075F PR MOST RECENT SYSTOLIC BLOOD PRESS GE 130-139MM HG: ICD-10-PCS | Mod: CPTII,,, | Performed by: STUDENT IN AN ORGANIZED HEALTH CARE EDUCATION/TRAINING PROGRAM

## 2023-06-22 PROCEDURE — 1160F PR REVIEW ALL MEDS BY PRESCRIBER/CLIN PHARMACIST DOCUMENTED: ICD-10-PCS | Mod: CPTII,,, | Performed by: STUDENT IN AN ORGANIZED HEALTH CARE EDUCATION/TRAINING PROGRAM

## 2023-06-22 PROCEDURE — 3079F PR MOST RECENT DIASTOLIC BLOOD PRESSURE 80-89 MM HG: ICD-10-PCS | Mod: CPTII,,, | Performed by: STUDENT IN AN ORGANIZED HEALTH CARE EDUCATION/TRAINING PROGRAM

## 2023-06-22 PROCEDURE — 99999 PR PBB SHADOW E&M-EST. PATIENT-LVL III: CPT | Mod: PBBFAC,,, | Performed by: STUDENT IN AN ORGANIZED HEALTH CARE EDUCATION/TRAINING PROGRAM

## 2023-06-22 PROCEDURE — 3008F PR BODY MASS INDEX (BMI) DOCUMENTED: ICD-10-PCS | Mod: CPTII,,, | Performed by: STUDENT IN AN ORGANIZED HEALTH CARE EDUCATION/TRAINING PROGRAM

## 2023-06-22 PROCEDURE — 80061 LIPID PANEL: CPT | Performed by: STUDENT IN AN ORGANIZED HEALTH CARE EDUCATION/TRAINING PROGRAM

## 2023-06-22 PROCEDURE — 1159F PR MEDICATION LIST DOCUMENTED IN MEDICAL RECORD: ICD-10-PCS | Mod: CPTII,,, | Performed by: STUDENT IN AN ORGANIZED HEALTH CARE EDUCATION/TRAINING PROGRAM

## 2023-06-22 PROCEDURE — 3008F BODY MASS INDEX DOCD: CPT | Mod: CPTII,,, | Performed by: STUDENT IN AN ORGANIZED HEALTH CARE EDUCATION/TRAINING PROGRAM

## 2023-06-22 PROCEDURE — 99396 PREV VISIT EST AGE 40-64: CPT | Mod: S$PBB,,, | Performed by: STUDENT IN AN ORGANIZED HEALTH CARE EDUCATION/TRAINING PROGRAM

## 2023-06-22 PROCEDURE — 1159F MED LIST DOCD IN RCRD: CPT | Mod: CPTII,,, | Performed by: STUDENT IN AN ORGANIZED HEALTH CARE EDUCATION/TRAINING PROGRAM

## 2023-06-22 PROCEDURE — 3079F DIAST BP 80-89 MM HG: CPT | Mod: CPTII,,, | Performed by: STUDENT IN AN ORGANIZED HEALTH CARE EDUCATION/TRAINING PROGRAM

## 2023-06-22 PROCEDURE — 3075F SYST BP GE 130 - 139MM HG: CPT | Mod: CPTII,,, | Performed by: STUDENT IN AN ORGANIZED HEALTH CARE EDUCATION/TRAINING PROGRAM

## 2023-06-22 PROCEDURE — 99999 PR PBB SHADOW E&M-EST. PATIENT-LVL III: ICD-10-PCS | Mod: PBBFAC,,, | Performed by: STUDENT IN AN ORGANIZED HEALTH CARE EDUCATION/TRAINING PROGRAM

## 2023-06-22 PROCEDURE — 36415 COLL VENOUS BLD VENIPUNCTURE: CPT | Mod: PO | Performed by: STUDENT IN AN ORGANIZED HEALTH CARE EDUCATION/TRAINING PROGRAM

## 2023-06-22 PROCEDURE — 99396 PR PREVENTIVE VISIT,EST,40-64: ICD-10-PCS | Mod: S$PBB,,, | Performed by: STUDENT IN AN ORGANIZED HEALTH CARE EDUCATION/TRAINING PROGRAM

## 2023-06-22 PROCEDURE — 99213 OFFICE O/P EST LOW 20 MIN: CPT | Mod: PBBFAC,PO | Performed by: STUDENT IN AN ORGANIZED HEALTH CARE EDUCATION/TRAINING PROGRAM

## 2023-06-22 PROCEDURE — 1160F RVW MEDS BY RX/DR IN RCRD: CPT | Mod: CPTII,,, | Performed by: STUDENT IN AN ORGANIZED HEALTH CARE EDUCATION/TRAINING PROGRAM

## 2023-06-22 RX ORDER — ESCITALOPRAM OXALATE 10 MG/1
10 TABLET ORAL DAILY
Qty: 90 TABLET | Refills: 1 | Status: SHIPPED | OUTPATIENT
Start: 2023-06-22 | End: 2024-09-14

## 2023-06-22 RX ORDER — ALPRAZOLAM 0.5 MG/1
0.5 TABLET ORAL NIGHTLY PRN
Qty: 30 TABLET | Refills: 0 | Status: SHIPPED | OUTPATIENT
Start: 2023-06-22 | End: 2024-02-22 | Stop reason: SDUPTHER

## 2023-06-22 NOTE — PROGRESS NOTES
"[facility] CLINIC NOTE    Patient Name: Leobardo Rueda  YOB: 1965    PRESENTING HISTORY     History of Present Illness:  Ms. Leobardo Rueda is a 58 y.o. female here for routine checkup.     Chronic anxiety- has been between 5 and 10mg of lexapro.     Intermittent spiraling, takes fragment of xanax which helps.     Son had suicide attempt. Currently hospitalzed for 7 days.   Now finishing outpatient program. Obviously highly stressful time.       ROS      OBJECTIVE:   Vital Signs:  Vitals:    06/22/23 1427   BP: 130/86   Pulse: 78   Resp: 18   SpO2: 95%   Height: 5' 2" (1.575 m)          Physical Exam: Normal, no change.     Physical Exam    ASSESSMENT & PLAN:     Routine general medical examination at a health care facility  -     Lipid Panel; Future; Expected date: 06/22/2023    Anxiety  -     ALPRAZolam (XANAX) 0.5 MG tablet; Take 1 tablet (0.5 mg total) by mouth nightly as needed for Anxiety.  Dispense: 30 tablet; Refill: 0  -     EScitalopram oxalate (LEXAPRO) 10 MG tablet; Take 1 tablet (10 mg total) by mouth once daily.  Dispense: 90 tablet; Refill: 1    Encounter for screening for malignant neoplasm of colon  -     Fecal Immunochemical Test (iFOBT); Future; Expected date: 06/22/2023             Clint Mejía MD   Internal Medicine            "

## 2023-07-07 ENCOUNTER — CLINICAL SUPPORT (OUTPATIENT)
Dept: OBSTETRICS AND GYNECOLOGY | Facility: CLINIC | Age: 58
End: 2023-07-07
Payer: MEDICAID

## 2023-07-07 DIAGNOSIS — N95.1 SYMPTOMATIC MENOPAUSAL OR FEMALE CLIMACTERIC STATES: Primary | ICD-10-CM

## 2023-07-07 PROCEDURE — 96372 THER/PROPH/DIAG INJ SC/IM: CPT | Mod: PBBFAC

## 2023-07-07 RX ADMIN — TESTOSTERONE CYPIONATE 76 MG: 200 INJECTION, SOLUTION INTRAMUSCULAR at 09:07

## 2023-07-07 NOTE — PROGRESS NOTES
Here for hormone therapy injection, no complaints at this time. Injection given as ordered, tolerated well no reports of pain prior to or post injection. Advised to return to clinic as scheduled      Site:rb    Testerone:76 mg    CLINIC SUPPLIED MEDICATION         
Intact

## 2023-07-14 DIAGNOSIS — E78.5 HYPERLIPIDEMIA, UNSPECIFIED HYPERLIPIDEMIA TYPE: Primary | ICD-10-CM

## 2023-08-04 ENCOUNTER — CLINICAL SUPPORT (OUTPATIENT)
Dept: OBSTETRICS AND GYNECOLOGY | Facility: CLINIC | Age: 58
End: 2023-08-04
Payer: MEDICAID

## 2023-08-04 DIAGNOSIS — N95.1 SYMPTOMATIC MENOPAUSAL OR FEMALE CLIMACTERIC STATES: Primary | ICD-10-CM

## 2023-08-04 PROCEDURE — 96372 THER/PROPH/DIAG INJ SC/IM: CPT | Mod: PBBFAC

## 2023-08-04 PROCEDURE — 99999PBSHW PR PBB SHADOW TECHNICAL ONLY FILED TO HB: ICD-10-PCS | Mod: PBBFAC,,,

## 2023-08-04 PROCEDURE — 99999PBSHW PR PBB SHADOW TECHNICAL ONLY FILED TO HB: Mod: PBBFAC,,,

## 2023-08-04 RX ADMIN — TESTOSTERONE CYPIONATE 76 MG: 200 INJECTION, SOLUTION INTRAMUSCULAR at 09:08

## 2023-08-04 NOTE — PROGRESS NOTES
Here for hormone therapy injection, no complaints at this time. Injection given as ordered, tolerated well no reports of pain prior to or post injection. Advised to return to clinic as scheduled      Site:rb    Testerone:76 mg    CLINIC SUPPLIED MEDICATION

## 2023-09-01 ENCOUNTER — CLINICAL SUPPORT (OUTPATIENT)
Dept: OBSTETRICS AND GYNECOLOGY | Facility: CLINIC | Age: 58
End: 2023-09-01
Payer: MEDICAID

## 2023-09-01 DIAGNOSIS — N95.1 SYMPTOMATIC MENOPAUSAL OR FEMALE CLIMACTERIC STATES: Primary | ICD-10-CM

## 2023-09-01 PROCEDURE — 96372 THER/PROPH/DIAG INJ SC/IM: CPT | Mod: PBBFAC

## 2023-09-01 PROCEDURE — 99999PBSHW PR PBB SHADOW TECHNICAL ONLY FILED TO HB: ICD-10-PCS | Mod: PBBFAC,,,

## 2023-09-01 PROCEDURE — 99999PBSHW PR PBB SHADOW TECHNICAL ONLY FILED TO HB: Mod: PBBFAC,,,

## 2023-09-01 RX ORDER — TESTOSTERONE CYPIONATE 200 MG/ML
76 INJECTION, SOLUTION INTRAMUSCULAR
Status: COMPLETED | OUTPATIENT
Start: 2023-09-01 | End: 2023-12-21

## 2023-09-01 RX ADMIN — TESTOSTERONE CYPIONATE 76 MG: 200 INJECTION, SOLUTION INTRAMUSCULAR at 10:09

## 2023-09-29 ENCOUNTER — CLINICAL SUPPORT (OUTPATIENT)
Dept: OBSTETRICS AND GYNECOLOGY | Facility: CLINIC | Age: 58
End: 2023-09-29
Payer: MEDICAID

## 2023-09-29 DIAGNOSIS — N95.1 SYMPTOMATIC MENOPAUSAL OR FEMALE CLIMACTERIC STATES: Primary | ICD-10-CM

## 2023-09-29 PROCEDURE — 99999PBSHW PR PBB SHADOW TECHNICAL ONLY FILED TO HB: ICD-10-PCS | Mod: PBBFAC,,,

## 2023-09-29 PROCEDURE — 99999PBSHW PR PBB SHADOW TECHNICAL ONLY FILED TO HB: Mod: PBBFAC,,,

## 2023-09-29 PROCEDURE — 96372 THER/PROPH/DIAG INJ SC/IM: CPT | Mod: PBBFAC

## 2023-09-29 RX ADMIN — TESTOSTERONE CYPIONATE 76 MG: 200 INJECTION, SOLUTION INTRAMUSCULAR at 09:09

## 2023-09-29 NOTE — PROGRESS NOTES
Here for hormone therapy injection, no complaints at this time. Injection given as ordered, tolerated well no reports of pain prior to or post injection. Advised to return to clinic as scheduled      Site:[facility]    Testerone:76mg    CLINIC SUPPLIED MEDICATION

## 2023-10-27 ENCOUNTER — CLINICAL SUPPORT (OUTPATIENT)
Dept: OBSTETRICS AND GYNECOLOGY | Facility: CLINIC | Age: 58
End: 2023-10-27
Payer: MEDICAID

## 2023-10-27 DIAGNOSIS — N95.1 SYMPTOMATIC MENOPAUSAL OR FEMALE CLIMACTERIC STATES: Primary | ICD-10-CM

## 2023-10-27 PROCEDURE — 96372 THER/PROPH/DIAG INJ SC/IM: CPT | Mod: PBBFAC

## 2023-10-27 PROCEDURE — 99999PBSHW PR PBB SHADOW TECHNICAL ONLY FILED TO HB: Mod: PBBFAC,,,

## 2023-10-27 PROCEDURE — 99999PBSHW PR PBB SHADOW TECHNICAL ONLY FILED TO HB: ICD-10-PCS | Mod: PBBFAC,,,

## 2023-10-27 PROCEDURE — 99999 PR PBB SHADOW E&M-EST. PATIENT-LVL I: CPT | Mod: PBBFAC,,,

## 2023-10-27 PROCEDURE — 99999 PR PBB SHADOW E&M-EST. PATIENT-LVL I: ICD-10-PCS | Mod: PBBFAC,,,

## 2023-10-27 RX ADMIN — TESTOSTERONE CYPIONATE 76 MG: 200 INJECTION, SOLUTION INTRAMUSCULAR at 11:10

## 2023-10-27 NOTE — PROGRESS NOTES
Here for hormone therapy injection. After obtaining consent, and per orders of Dr. RODRIGUEZ, injection of TESTOSTERONE CYPIONATE Lot: [id number] Exp: 31 DEC 2025 given in the [facility] by VENITA MCNEIL. Patient tolerated well and band aid applied. No complaints at this time. Patient instructed to remain in clinic for 15 minutes afterwards, and to report any adverse reaction to me immediately. No report of pain prior to or after injection. Return to clinic as scheduled.      CLINIC SUPPLIED MEDICATION    TESTOSTERONE CYPIONATE 76 MG

## 2023-11-20 ENCOUNTER — TELEPHONE (OUTPATIENT)
Dept: OBSTETRICS AND GYNECOLOGY | Facility: CLINIC | Age: 58
End: 2023-11-20
Payer: MEDICAID

## 2023-11-24 ENCOUNTER — CLINICAL SUPPORT (OUTPATIENT)
Dept: OBSTETRICS AND GYNECOLOGY | Facility: CLINIC | Age: 58
End: 2023-11-24
Payer: MEDICAID

## 2023-11-24 DIAGNOSIS — N95.1 SYMPTOMATIC MENOPAUSAL OR FEMALE CLIMACTERIC STATES: Primary | ICD-10-CM

## 2023-11-24 PROCEDURE — 99999PBSHW PR PBB SHADOW TECHNICAL ONLY FILED TO HB: ICD-10-PCS | Mod: PBBFAC,,,

## 2023-11-24 PROCEDURE — 99999PBSHW PR PBB SHADOW TECHNICAL ONLY FILED TO HB: Mod: PBBFAC,,,

## 2023-11-24 PROCEDURE — 96372 THER/PROPH/DIAG INJ SC/IM: CPT | Mod: PBBFAC

## 2023-11-24 RX ADMIN — TESTOSTERONE CYPIONATE 76 MG: 200 INJECTION, SOLUTION INTRAMUSCULAR at 10:11

## 2023-11-24 NOTE — PROGRESS NOTES
Here for hormone therapy injection, no complaints at this time, Injection given as ordered, tolerated well, no report of pain prior to or after injection. Return to clinic as scheduled.     Site - [facility]    Testosterone  76 mg    Clinic Supplied Medication

## 2023-12-08 ENCOUNTER — LAB VISIT (OUTPATIENT)
Dept: LAB | Facility: HOSPITAL | Age: 58
End: 2023-12-08
Payer: MEDICAID

## 2023-12-08 DIAGNOSIS — N95.1 SYMPTOMATIC MENOPAUSAL OR FEMALE CLIMACTERIC STATES: ICD-10-CM

## 2023-12-08 PROCEDURE — 84402 ASSAY OF FREE TESTOSTERONE: CPT | Performed by: OBSTETRICS & GYNECOLOGY

## 2023-12-08 PROCEDURE — 36415 COLL VENOUS BLD VENIPUNCTURE: CPT | Performed by: OBSTETRICS & GYNECOLOGY

## 2023-12-08 PROCEDURE — 82670 ASSAY OF TOTAL ESTRADIOL: CPT | Performed by: OBSTETRICS & GYNECOLOGY

## 2023-12-09 LAB — ESTRADIOL SERPL-MCNC: 12.7 PG/ML

## 2023-12-13 LAB — TESTOST FREE SERPL-MCNC: 2.2 PG/ML (ref 0–4.2)

## 2023-12-16 ENCOUNTER — HOSPITAL ENCOUNTER (EMERGENCY)
Facility: HOSPITAL | Age: 58
Discharge: HOME | End: 2023-12-16
Attending: EMERGENCY MEDICINE
Payer: MEDICAID

## 2023-12-16 VITALS
DIASTOLIC BLOOD PRESSURE: 70 MMHG | OXYGEN SATURATION: 100 % | SYSTOLIC BLOOD PRESSURE: 118 MMHG | TEMPERATURE: 97 F | RESPIRATION RATE: 18 BRPM | HEART RATE: 69 BPM

## 2023-12-16 VITALS
SYSTOLIC BLOOD PRESSURE: 123 MMHG | TEMPERATURE: 98 F | RESPIRATION RATE: 18 BRPM | BODY MASS INDEX: 23.92 KG/M2 | HEART RATE: 74 BPM | DIASTOLIC BLOOD PRESSURE: 71 MMHG | OXYGEN SATURATION: 95 % | WEIGHT: 130 LBS | HEIGHT: 62 IN

## 2023-12-16 DIAGNOSIS — M25.512 ACUTE PAIN OF LEFT SHOULDER: Primary | ICD-10-CM

## 2023-12-16 DIAGNOSIS — S42.212D CLOSED DISPLACED FRACTURE OF SURGICAL NECK OF LEFT HUMERUS WITH ROUTINE HEALING, UNSPECIFIED FRACTURE MORPHOLOGY, SUBSEQUENT ENCOUNTER: Primary | ICD-10-CM

## 2023-12-16 DIAGNOSIS — S42.215A CLOSED NONDISPLACED FRACTURE OF SURGICAL NECK OF LEFT HUMERUS, UNSPECIFIED FRACTURE MORPHOLOGY, INITIAL ENCOUNTER: ICD-10-CM

## 2023-12-16 LAB
ALBUMIN SERPL BCP-MCNC: 4.2 G/DL (ref 3.5–5.2)
ALP SERPL-CCNC: 40 U/L (ref 55–135)
ALT SERPL W/O P-5'-P-CCNC: 11 U/L (ref 10–44)
ANION GAP SERPL CALC-SCNC: 8 MMOL/L (ref 8–16)
AST SERPL-CCNC: 17 U/L (ref 10–40)
BASOPHILS # BLD AUTO: 0.03 K/UL (ref 0–0.2)
BASOPHILS NFR BLD: 0.5 % (ref 0–1.9)
BILIRUB SERPL-MCNC: 0.6 MG/DL (ref 0.1–1)
BUN SERPL-MCNC: 9 MG/DL (ref 6–20)
CALCIUM SERPL-MCNC: 9 MG/DL (ref 8.7–10.5)
CHLORIDE SERPL-SCNC: 100 MMOL/L (ref 95–110)
CO2 SERPL-SCNC: 26 MMOL/L (ref 23–29)
CREAT SERPL-MCNC: 0.7 MG/DL (ref 0.5–1.4)
DIFFERENTIAL METHOD BLD: ABNORMAL
EOSINOPHIL # BLD AUTO: 0 K/UL (ref 0–0.5)
EOSINOPHIL NFR BLD: 0.5 % (ref 0–8)
ERYTHROCYTE [DISTWIDTH] IN BLOOD BY AUTOMATED COUNT: 12.3 % (ref 11.5–14.5)
EST. GFR  (NO RACE VARIABLE): >60 ML/MIN/1.73 M^2
GLUCOSE SERPL-MCNC: 101 MG/DL (ref 70–110)
HCT VFR BLD AUTO: 43.8 % (ref 37–48.5)
HGB BLD-MCNC: 14.3 G/DL (ref 12–16)
IMM GRANULOCYTES # BLD AUTO: 0.02 K/UL (ref 0–0.04)
IMM GRANULOCYTES NFR BLD AUTO: 0.3 % (ref 0–0.5)
LYMPHOCYTES # BLD AUTO: 0.8 K/UL (ref 1–4.8)
LYMPHOCYTES NFR BLD: 14 % (ref 18–48)
MCH RBC QN AUTO: 29.9 PG (ref 27–31)
MCHC RBC AUTO-ENTMCNC: 32.6 G/DL (ref 32–36)
MCV RBC AUTO: 91 FL (ref 82–98)
MONOCYTES # BLD AUTO: 0.4 K/UL (ref 0.3–1)
MONOCYTES NFR BLD: 6.8 % (ref 4–15)
NEUTROPHILS # BLD AUTO: 4.6 K/UL (ref 1.8–7.7)
NEUTROPHILS NFR BLD: 77.9 % (ref 38–73)
NRBC BLD-RTO: 0 /100 WBC
PLATELET # BLD AUTO: 222 K/UL (ref 150–450)
PMV BLD AUTO: 10.7 FL (ref 9.2–12.9)
POTASSIUM SERPL-SCNC: 4.1 MMOL/L (ref 3.5–5.1)
PROT SERPL-MCNC: 7 G/DL (ref 6–8.4)
RBC # BLD AUTO: 4.79 M/UL (ref 4–5.4)
SODIUM SERPL-SCNC: 134 MMOL/L (ref 136–145)
WBC # BLD AUTO: 5.91 K/UL (ref 3.9–12.7)

## 2023-12-16 PROCEDURE — 80053 COMPREHEN METABOLIC PANEL: CPT | Performed by: EMERGENCY MEDICINE

## 2023-12-16 PROCEDURE — 25000003 PHARM REV CODE 250: Performed by: EMERGENCY MEDICINE

## 2023-12-16 PROCEDURE — 96375 TX/PRO/DX INJ NEW DRUG ADDON: CPT

## 2023-12-16 PROCEDURE — 63600175 PHARM REV CODE 636 W HCPCS: Performed by: EMERGENCY MEDICINE

## 2023-12-16 PROCEDURE — 99284 EMERGENCY DEPT VISIT MOD MDM: CPT

## 2023-12-16 PROCEDURE — 99283 EMERGENCY DEPT VISIT LOW MDM: CPT | Mod: 25

## 2023-12-16 PROCEDURE — 96376 TX/PRO/DX INJ SAME DRUG ADON: CPT

## 2023-12-16 PROCEDURE — 85025 COMPLETE CBC W/AUTO DIFF WBC: CPT | Performed by: EMERGENCY MEDICINE

## 2023-12-16 PROCEDURE — 96374 THER/PROPH/DIAG INJ IV PUSH: CPT

## 2023-12-16 RX ORDER — MORPHINE SULFATE 2 MG/ML
2 INJECTION, SOLUTION INTRAMUSCULAR; INTRAVENOUS
Status: COMPLETED | OUTPATIENT
Start: 2023-12-16 | End: 2023-12-16

## 2023-12-16 RX ORDER — HYDROCODONE BITARTRATE AND ACETAMINOPHEN 5; 325 MG/1; MG/1
1 TABLET ORAL
Status: COMPLETED | OUTPATIENT
Start: 2023-12-16 | End: 2023-12-16

## 2023-12-16 RX ORDER — ONDANSETRON HYDROCHLORIDE 2 MG/ML
4 INJECTION, SOLUTION INTRAVENOUS
Status: COMPLETED | OUTPATIENT
Start: 2023-12-16 | End: 2023-12-16

## 2023-12-16 RX ORDER — HYDROCODONE BITARTRATE AND ACETAMINOPHEN 5; 325 MG/1; MG/1
1 TABLET ORAL EVERY 6 HOURS PRN
Qty: 16 TABLET | Refills: 0 | Status: SHIPPED | OUTPATIENT
Start: 2023-12-16 | End: 2023-12-21 | Stop reason: SDUPTHER

## 2023-12-16 RX ORDER — HYDROCODONE BITARTRATE AND ACETAMINOPHEN 5; 325 MG/1; MG/1
1 TABLET ORAL EVERY 4 HOURS PRN
Qty: 12 TABLET | Refills: 0 | Status: SHIPPED | OUTPATIENT
Start: 2023-12-16

## 2023-12-16 RX ORDER — MORPHINE SULFATE 4 MG/ML
4 INJECTION, SOLUTION INTRAMUSCULAR; INTRAVENOUS
Status: COMPLETED | OUTPATIENT
Start: 2023-12-16 | End: 2023-12-16

## 2023-12-16 RX ORDER — KETOROLAC TROMETHAMINE 30 MG/ML
15 INJECTION, SOLUTION INTRAMUSCULAR; INTRAVENOUS
Status: COMPLETED | OUTPATIENT
Start: 2023-12-16 | End: 2023-12-16

## 2023-12-16 RX ADMIN — ONDANSETRON 4 MG: 2 INJECTION INTRAMUSCULAR; INTRAVENOUS at 04:12

## 2023-12-16 RX ADMIN — KETOROLAC TROMETHAMINE 15 MG: 30 INJECTION, SOLUTION INTRAMUSCULAR; INTRAVENOUS at 05:12

## 2023-12-16 RX ADMIN — MORPHINE SULFATE 4 MG: 4 INJECTION, SOLUTION INTRAMUSCULAR; INTRAVENOUS at 04:12

## 2023-12-16 RX ADMIN — MORPHINE SULFATE 2 MG: 2 INJECTION, SOLUTION INTRAMUSCULAR; INTRAVENOUS at 05:12

## 2023-12-16 RX ADMIN — HYDROCODONE BITARTRATE AND ACETAMINOPHEN 1 TABLET: 5; 325 TABLET ORAL at 08:12

## 2023-12-16 NOTE — ED PROVIDER NOTES
Encounter Date: 2023       History     Chief Complaint   Patient presents with    Shoulder Injury     Pt had a fall and hurt left shoulder     58-year-old female with history of anxiety hypothyroidism.  Patient presents emergency department with complaint of left shoulder pain.  Patient states that she was walking tripped and fell directly onto left shoulder.  Now she has 10/10 pain unable to abduct her shoulder.  She denies any head injury, denies any additional trauma including no neck or back injury, denies any other musculoskeletal complaints.      Review of patient's allergies indicates:  No Known Allergies  Past Medical History:   Diagnosis Date    Anxiety     Hypothyroidism      Past Surgical History:   Procedure Laterality Date    AUGMENTATION OF BREAST      breast augmentation  10 years ago     SECTION      hemmorhiodectomy      tummy tuck      10 years ago     Family History   Problem Relation Age of Onset    Diabetes Mother     COPD Mother     Anxiety disorder Son     Colon cancer Paternal Grandfather      Social History     Tobacco Use    Smoking status: Never    Smokeless tobacco: Never   Substance Use Topics    Alcohol use: No    Drug use: No     Review of Systems   Constitutional:  Negative for fever.   HENT:  Negative for sore throat.    Respiratory:  Negative for shortness of breath.    Cardiovascular:  Negative for chest pain.   Gastrointestinal:  Negative for abdominal pain, nausea and vomiting.   Genitourinary:  Negative for dysuria.   Musculoskeletal:  Positive for arthralgias and myalgias. Negative for back pain.   Skin:  Negative for rash.   Neurological:  Negative for weakness.   Hematological:  Does not bruise/bleed easily.       Physical Exam     Initial Vitals [23 1440]   BP Pulse Resp Temp SpO2   115/73 74 19 98.2 °F (36.8 °C) 96 %      MAP       --         Physical Exam    Nursing note and vitals reviewed.  Constitutional: She appears well-developed and  well-nourished.   HENT:   Head: Normocephalic and atraumatic.   Nose: Nose normal.   Mouth/Throat: Oropharynx is clear and moist.   Eyes: Conjunctivae and EOM are normal. Pupils are equal, round, and reactive to light.   Neck: Neck supple. No thyromegaly present. No tracheal deviation present.   Normal range of motion.  Cardiovascular:  Normal rate, regular rhythm, normal heart sounds and intact distal pulses.     Exam reveals no gallop and no friction rub.       No murmur heard.  Pulmonary/Chest: No stridor. No respiratory distress.   Course bilateral breath sounds no adventitious sounds   Abdominal: Abdomen is soft. Bowel sounds are normal. She exhibits no mass. There is no rebound and no guarding.   Musculoskeletal:         General: No edema. Normal range of motion.        Arms:       Cervical back: Normal range of motion and neck supple.     Lymphadenopathy:     She has no cervical adenopathy.   Neurological: She is alert and oriented to person, place, and time. She has normal strength and normal reflexes. GCS score is 15. GCS eye subscore is 4. GCS verbal subscore is 5. GCS motor subscore is 6.   Skin: Skin is warm and dry. Capillary refill takes less than 2 seconds.   Psychiatric: She has a normal mood and affect.         ED Course   Splint Application    Date/Time: 12/16/2023 4:55 PM    Performed by: Gatito Pichardo MD  Authorized by: Gatito Pichardo MD  Consent Done: Yes  Consent: Verbal consent obtained.  Consent given by: patient  Location details: left arm  Splint type: Shoulder immobilizer.  Post-procedure: The splinted body part was neurovascularly unchanged following the procedure.  Patient tolerance: Patient tolerated the procedure well with no immediate complications        Labs Reviewed   CBC W/ AUTO DIFFERENTIAL - Abnormal; Notable for the following components:       Result Value    Lymph # 0.8 (*)     Gran % 77.9 (*)     Lymph % 14.0 (*)     All other components within normal limits    COMPREHENSIVE METABOLIC PANEL - Abnormal; Notable for the following components:    Sodium 134 (*)     Alkaline Phosphatase 40 (*)     All other components within normal limits          Imaging Results              X-Ray Shoulder 2 or More Views Left (In process)                      X-Ray Humerus 2 View Left (In process)                      Medications   morphine injection 4 mg (4 mg Intravenous Given 12/16/23 1609)   ondansetron injection 4 mg (4 mg Intravenous Given 12/16/23 1600)     Medical Decision Making  Amount and/or Complexity of Data Reviewed  Labs: ordered.  Radiology: ordered.    Risk  Prescription drug management.                          Medical Decision Making:   Initial Assessment:   58-year-old female with history of anxiety hypothyroidism.  Patient presents emergency department with complaint of left shoulder pain.  Patient states that she was walking tripped and fell directly onto left shoulder.  Now she has 10/10 pain unable to abduct her shoulder.  She denies any head injury, denies any additional trauma including no neck or back injury, denies any other musculoskeletal complaints.  Differential Diagnosis:   Shoulder dislocation, AC separation, humeral neck fracture, humerus fracture, shoulder contusion  Clinical Tests:   Lab Tests: Ordered and Reviewed  Radiological Study: Ordered and Reviewed  ED Management:  Patient found with spiral oblique minimally displaced proximal humeral neck fracture.  Positive neurovascularly intact.  Patient was placed in a shoulder immobilizer.  She would need to follow up with Orthopedics next week.  She was given Norco as needed for pain.  She is return to emergency department if problems persist worsens or additional concerns.             Clinical Impression:  Final diagnoses:  [M25.512] Acute pain of left shoulder (Primary)  [S42.215A] Closed nondisplaced fracture of surgical neck of left humerus, unspecified fracture morphology, initial encounter           ED Disposition Condition    Discharge Stable          ED Prescriptions       Medication Sig Dispense Start Date End Date Auth. Provider    HYDROcodone-acetaminophen (NORCO) 5-325 mg per tablet Take 1 tablet by mouth every 6 (six) hours as needed for Pain. 16 tablet 12/16/2023 -- Gatito Pichardo MD          Follow-up Information       Follow up With Specialties Details Why Contact Info    Austen Magaña MD Sports Medicine, Orthopedic Surgery Schedule an appointment as soon as possible for a visit in 1 week For recheck/continuing care [address] [place] [address] [address]  [place] [address]  [phone]               Gatito Pichardo MD  12/16/23 1659       Gatito Pichardo MD  12/16/23 1653       Gatito Pichardo MD  12/16/23 5314

## 2023-12-17 NOTE — ED PROVIDER NOTES
Encounter Date: 2023       History     Chief Complaint   Patient presents with    Medication Refill     Patient was seen earlier today and diagnosed with left proximal humerus fracture.  Her pain medication was called into a pharmacy that is now closed.  No new trauma.      Review of patient's allergies indicates:  No Known Allergies  Past Medical History:   Diagnosis Date    Anxiety     Hypothyroidism      Past Surgical History:   Procedure Laterality Date    AUGMENTATION OF BREAST      breast augmentation  10 years ago     SECTION      hemmorhiodectomy      tummy tuck      10 years ago     Family History   Problem Relation Age of Onset    Diabetes Mother     COPD Mother     Anxiety disorder Son     Colon cancer Paternal Grandfather      Social History     Tobacco Use    Smoking status: Never    Smokeless tobacco: Never   Substance Use Topics    Alcohol use: No    Drug use: No     Review of Systems   Neurological:  Negative for syncope.       Physical Exam     Initial Vitals [23]   BP Pulse Resp Temp SpO2   118/70 69 16 97.2 °F (36.2 °C) 100 %      MAP       --         Physical Exam    Nursing note and vitals reviewed.  Constitutional: She is not diaphoretic. No distress.   HENT:   Head: Normocephalic and atraumatic.   Eyes: Conjunctivae are normal.   Neck:   Normal range of motion.  Abdominal: There is no abdominal tenderness.   Musculoskeletal:      Cervical back: Normal range of motion.      Comments: Left arm in sling and swath.  Good range of motion of fingers and wrist.  Capillary refill less than 2 seconds.  Sensation intact.  There is tenderness over left deltoid area.     Neurological: She is alert.   No gross deficits   Skin: No rash noted.   Psychiatric: She has a normal mood and affect.         ED Course   Procedures  Labs Reviewed - No data to display       Imaging Results    None          Medications   HYDROcodone-acetaminophen 5-325 mg per tablet 1 tablet (has no  administration in time range)     Medical Decision Making  Patient here earlier with proximal humerus fracture.  Patient continues to be in pain.  She is unable to get her prescription until tomorrow.  Will give a pain pill here another prescription.  Her arm is neurovascular intact.  Patient given orthopedist information.    Risk  Prescription drug management.                                      Clinical Impression:  Final diagnoses:  [V83.439B] Closed displaced fracture of surgical neck of left humerus with routine healing, unspecified fracture morphology, subsequent encounter (Primary)          ED Disposition Condition    Discharge Stable          ED Prescriptions       Medication Sig Dispense Start Date End Date Auth. Provider    HYDROcodone-acetaminophen (NORCO) 5-325 mg per tablet Take 1 tablet by mouth every 4 (four) hours as needed for Pain. 12 tablet 12/16/2023 -- Luis Blandon MD          Follow-up Information       Follow up With Specialties Details Why Contact Info Additional Information    [facility] - Emergency Dept Emergency Medicine  If symptoms worsen [address] [facility] [address]  [phone] 1st floor    Garland Fernandez MD Orthopedic Surgery, Surgery Schedule an appointment as soon as possible for a visit   [address] [place] [address] [address]Z [address]  [phone]                Luis Blandon MD  12/16/23 1959       Luis Blandon MD  12/16/23 2008

## 2023-12-18 ENCOUNTER — TELEPHONE (OUTPATIENT)
Dept: ORTHOPEDICS | Facility: CLINIC | Age: 58
End: 2023-12-18
Payer: MEDICAID

## 2023-12-18 NOTE — TELEPHONE ENCOUNTER
----- Message from Austen Magaña MD sent at 12/18/2023  9:28 AM CST -----  Contact: pt  ok  ----- Message -----  From: Lauren Montez MA  Sent: 12/18/2023   9:17 AM CST  To: Austen Magaña MD    Please advise if ok to schedule   ----- Message -----  From: Mariam Lemus MA  Sent: 12/18/2023   9:08 AM CST  To: [facility]    ER follow up, FX left arm   Call back to schedule   Call back

## 2023-12-21 ENCOUNTER — CLINICAL SUPPORT (OUTPATIENT)
Dept: OBSTETRICS AND GYNECOLOGY | Facility: CLINIC | Age: 58
End: 2023-12-21
Payer: MEDICAID

## 2023-12-21 ENCOUNTER — OFFICE VISIT (OUTPATIENT)
Dept: ORTHOPEDICS | Facility: CLINIC | Age: 58
End: 2023-12-21
Payer: MEDICAID

## 2023-12-21 VITALS — WEIGHT: 130 LBS | HEIGHT: 62 IN | BODY MASS INDEX: 23.92 KG/M2 | RESPIRATION RATE: 18 BRPM

## 2023-12-21 DIAGNOSIS — S42.292D CLOSED 3-PART FRACTURE OF PROXIMAL HUMERUS WITH ROUTINE HEALING, LEFT: Primary | ICD-10-CM

## 2023-12-21 DIAGNOSIS — N95.1 SYMPTOMATIC MENOPAUSAL OR FEMALE CLIMACTERIC STATES: Primary | ICD-10-CM

## 2023-12-21 PROCEDURE — 23600 CLTX PROX HUMRL FX W/O MNPJ: CPT | Mod: PBBFAC,PO | Performed by: ORTHOPAEDIC SURGERY

## 2023-12-21 PROCEDURE — 99999PBSHW PR PBB SHADOW TECHNICAL ONLY FILED TO HB: Mod: PBBFAC,,,

## 2023-12-21 PROCEDURE — 23600 PR CLOSED RX PROX HUMERUS FRACTURE: ICD-10-PCS | Mod: S$PBB,LT,, | Performed by: ORTHOPAEDIC SURGERY

## 2023-12-21 PROCEDURE — 99204 PR OFFICE/OUTPT VISIT, NEW, LEVL IV, 45-59 MIN: ICD-10-PCS | Mod: 57,S$PBB,, | Performed by: ORTHOPAEDIC SURGERY

## 2023-12-21 PROCEDURE — 1159F MED LIST DOCD IN RCRD: CPT | Mod: CPTII,,, | Performed by: ORTHOPAEDIC SURGERY

## 2023-12-21 PROCEDURE — 99999 PR PBB SHADOW E&M-EST. PATIENT-LVL III: CPT | Mod: PBBFAC,,, | Performed by: ORTHOPAEDIC SURGERY

## 2023-12-21 PROCEDURE — 23600 CLTX PROX HUMRL FX W/O MNPJ: CPT | Mod: S$PBB,LT,, | Performed by: ORTHOPAEDIC SURGERY

## 2023-12-21 PROCEDURE — 3008F PR BODY MASS INDEX (BMI) DOCUMENTED: ICD-10-PCS | Mod: CPTII,,, | Performed by: ORTHOPAEDIC SURGERY

## 2023-12-21 PROCEDURE — 1159F PR MEDICATION LIST DOCUMENTED IN MEDICAL RECORD: ICD-10-PCS | Mod: CPTII,,, | Performed by: ORTHOPAEDIC SURGERY

## 2023-12-21 PROCEDURE — 96372 THER/PROPH/DIAG INJ SC/IM: CPT | Mod: PBBFAC

## 2023-12-21 PROCEDURE — 3008F BODY MASS INDEX DOCD: CPT | Mod: CPTII,,, | Performed by: ORTHOPAEDIC SURGERY

## 2023-12-21 PROCEDURE — 99999PBSHW PR PBB SHADOW TECHNICAL ONLY FILED TO HB: ICD-10-PCS | Mod: PBBFAC,,,

## 2023-12-21 PROCEDURE — 99213 OFFICE O/P EST LOW 20 MIN: CPT | Mod: PBBFAC,PO,25 | Performed by: ORTHOPAEDIC SURGERY

## 2023-12-21 PROCEDURE — 99204 OFFICE O/P NEW MOD 45 MIN: CPT | Mod: 57,S$PBB,, | Performed by: ORTHOPAEDIC SURGERY

## 2023-12-21 PROCEDURE — 99999 PR PBB SHADOW E&M-EST. PATIENT-LVL III: ICD-10-PCS | Mod: PBBFAC,,, | Performed by: ORTHOPAEDIC SURGERY

## 2023-12-21 RX ORDER — HYDROCODONE BITARTRATE AND ACETAMINOPHEN 5; 325 MG/1; MG/1
1 TABLET ORAL EVERY 6 HOURS PRN
Qty: 28 TABLET | Refills: 0 | Status: SHIPPED | OUTPATIENT
Start: 2023-12-21

## 2023-12-21 RX ADMIN — TESTOSTERONE CYPIONATE 76 MG: 200 INJECTION, SOLUTION INTRAMUSCULAR at 11:12

## 2023-12-21 NOTE — PROGRESS NOTES
Past Medical History:   Diagnosis Date    Anxiety     Hypothyroidism        Past Surgical History:   Procedure Laterality Date    AUGMENTATION OF BREAST      breast augmentation  10 years ago     SECTION      hemmorhiodectomy      tummy tuck      10 years ago       Current Outpatient Medications   Medication Sig    ALPRAZolam (XANAX) 0.5 MG tablet Take 1 tablet (0.5 mg total) by mouth nightly as needed for Anxiety.    EScitalopram oxalate (LEXAPRO) 10 MG tablet Take 1 tablet (10 mg total) by mouth once daily.    fluticasone propionate (FLONASE) 50 mcg/actuation nasal spray SHAKE LIQUID AND USE 1 SPRAY(50 MCG) IN EACH NOSTRIL TWICE DAILY    HYDROcodone-acetaminophen (NORCO) 5-325 mg per tablet Take 1 tablet by mouth every 6 (six) hours as needed for Pain.    HYDROcodone-acetaminophen (NORCO) 5-325 mg per tablet Take 1 tablet by mouth every 4 (four) hours as needed for Pain.    SYNTHROID 75 mcg tablet Take 75 mcg by mouth before breakfast.    valACYclovir (VALTREX) 1000 MG tablet Take 2,000 mg by mouth 2 (two) times daily.    VITAMIN D3 25 mcg (1,000 unit) tablet Take 1,000 Units by mouth once daily.     Current Facility-Administered Medications   Medication    testosterone cypionate injection 76 mg       Review of patient's allergies indicates:  No Known Allergies    Family History   Problem Relation Age of Onset    Diabetes Mother     COPD Mother     Anxiety disorder Son     Colon cancer Paternal Grandfather        Social History     Socioeconomic History    Marital status:    Tobacco Use    Smoking status: Never    Smokeless tobacco: Never   Substance and Sexual Activity    Alcohol use: No    Drug use: No    Sexual activity: Yes     Partners: Male     Birth control/protection: None     Social Determinants of Health     Food Insecurity: No Food Insecurity (2022)    Hunger Vital Sign     Worried About Running Out of Food in the Last Year: Never true     Ran Out of Food in the Last Year: Never  true   Transportation Needs: No Transportation Needs (6/14/2022)    PRAPARE - Transportation     Lack of Transportation (Medical): No     Lack of Transportation (Non-Medical): No   Physical Activity: Sufficiently Active (6/14/2022)    Exercise Vital Sign     Days of Exercise per Week: 5 days     Minutes of Exercise per Session: 30 min   Stress: Stress Concern Present (6/14/2022)    [employer] [employer] - Occupational Stress Questionnaire     Feeling of Stress : To some extent   Social Connections: Unknown (6/14/2022)    Social Connection and Isolation Panel [NHANES]     Frequency of Communication with Friends and Family: More than three times a week     Frequency of Social Gatherings with Friends and Family: More than three times a week     Active Member of Clubs or Organizations: Yes     Attends Club or Organization Meetings: More than 4 times per year     Marital Status:    Housing Stability: High Risk (6/14/2022)    Housing Stability Vital Sign     Unable to Pay for Housing in the Last Year: Yes     Unstable Housing in the Last Year: No       Chief Complaint:   Chief Complaint   Patient presents with    Right Shoulder - Pain       History of present illness:  58-year-old female seen for emergency room follow-up after a fall on December 16th where she injured her left shoulder.  Patient tripped over a fan and landed onto her left shoulder.  Pain is 6/10.  Wearing a shoulder immobilizer and a sling      Review of Systems:    Constitution: Negative for chills, fever, and sweats.  Negative for unexplained weight loss.    HENT:  Negative for headaches and blurry vision.    Cardiovascular:Negative for chest pain or irregular heart beat. Negative for hypertension.    Respiratory:  Negative for cough and shortness of breath.    Gastrointestinal: Negative for abdominal pain, heartburn, melena, nausea, and vomitting.    Genitourinary:  Negative bladder incontinence and  dysuria.    Musculoskeletal:  See HPI    Neurological: Negative for numbness.    Psychiatric/Behavioral: Negative for depression.  The patient is not nervous/anxious.      Endocrine: Negative for polyuria    Hematologic/Lymphatic: Negative for bleeding problem.  Does not bruise/bleed easily.    Skin: Negative for poor would healing and rash      Physical Examination:    Vital Signs:    Vitals:    12/21/23 0819   Resp: 18       Body mass index is 23.78 kg/m².    This a well-developed, well nourished patient in no acute distress.  They are alert and oriented and cooperative to examination.  Pt. walks without an antalgic gait.      Examination left shoulder shows moderate bruising and swelling.  Patient is able to wiggle her fingers and thumb.  Brisk capillary refill and intact light touch sensation in all digits.    X-rays:  X-rays left shoulder and humerus are available for review which show a 3 part proximal humerus fracture in acceptable alignment     Assessment::  Left 3 part proximal humerus fracture    Plan:  Reviewed the findings with her today.  Fracture is well-aligned without significant displacement.  Recommended non operative treatment.  Placed her in an UltraSling today.  I will see her back in 3 weeks with another x-ray of the left shoulder.  We will start some physical therapy and stop the sling at that point.    All previous pertinent notes including ER visits, physical therapy visits, other orthopedic visits as well as other care for the same musculoskeletal problem were reviewed.  All pertinent lab values and previous imaging was reviewed pertinent to the current visit.    This note was created using [employer] voice recognition software that occasionally misinterpreted phrases or words.    Consult note is delivered via Epic messaging service.

## 2023-12-21 NOTE — PROGRESS NOTES
Here for hormone therapy injection, no complaints at this time, Injection given as ordered, tolerated well, no report of pain prior to or after injection. Return to clinic as scheduled.     Site - [facility]    Testosterone  76  mg    Discussed with Dr Vo on lab values, orders given to continue current dose.    Clinic Supplied Medication

## 2024-01-02 ENCOUNTER — OFFICE VISIT (OUTPATIENT)
Dept: URGENT CARE | Facility: CLINIC | Age: 59
End: 2024-01-02
Payer: MEDICAID

## 2024-01-02 VITALS
SYSTOLIC BLOOD PRESSURE: 110 MMHG | DIASTOLIC BLOOD PRESSURE: 64 MMHG | TEMPERATURE: 98 F | HEART RATE: 100 BPM | HEIGHT: 62 IN | WEIGHT: 135 LBS | OXYGEN SATURATION: 99 % | BODY MASS INDEX: 24.84 KG/M2 | RESPIRATION RATE: 18 BRPM

## 2024-01-02 DIAGNOSIS — R09.81 NASAL CONGESTION: ICD-10-CM

## 2024-01-02 DIAGNOSIS — R11.10 VOMITING, UNSPECIFIED VOMITING TYPE, UNSPECIFIED WHETHER NAUSEA PRESENT: ICD-10-CM

## 2024-01-02 DIAGNOSIS — R11.0 NAUSEA: ICD-10-CM

## 2024-01-02 DIAGNOSIS — K52.9 GASTROENTERITIS: Primary | ICD-10-CM

## 2024-01-02 LAB
CTP QC/QA: YES
CTP QC/QA: YES
FLUAV AG NPH QL: NEGATIVE
FLUBV AG NPH QL: NEGATIVE
SARS-COV-2 AG RESP QL IA.RAPID: NEGATIVE

## 2024-01-02 PROCEDURE — 87804 INFLUENZA ASSAY W/OPTIC: CPT | Mod: QW,,,

## 2024-01-02 PROCEDURE — 87811 SARS-COV-2 COVID19 W/OPTIC: CPT | Mod: QW,S$GLB,,

## 2024-01-02 PROCEDURE — 99213 OFFICE O/P EST LOW 20 MIN: CPT | Mod: S$GLB,,,

## 2024-01-02 RX ORDER — ONDANSETRON 4 MG/1
4 TABLET, ORALLY DISINTEGRATING ORAL EVERY 8 HOURS PRN
Qty: 30 TABLET | Refills: 0 | Status: SHIPPED | OUTPATIENT
Start: 2024-01-02 | End: 2024-01-12

## 2024-01-02 RX ORDER — FLUTICASONE PROPIONATE 50 MCG
1 SPRAY, SUSPENSION (ML) NASAL DAILY
Qty: 15.8 ML | Refills: 0 | Status: SHIPPED | OUTPATIENT
Start: 2024-01-02

## 2024-01-02 NOTE — PROGRESS NOTES
"Subjective:      Patient ID: Leobardo Rueda is a 58 y.o. female.    Vitals:  height is 5' 2" (1.575 m) and weight is 61.2 kg (135 lb). Her temperature is 98.2 °F (36.8 °C). Her blood pressure is 110/64 and her pulse is 100. Her respiration is 18 and oxygen saturation is 99%.     Chief Complaint: Emesis and Diarrhea    Emesis   This is a new problem. The current episode started today. The problem has been gradually worsening. The emesis has an appearance of stomach contents. Associated symptoms include chills, diarrhea and myalgias. Pertinent negatives include no abdominal pain or fever. Associated symptoms comments: fatigue. She has tried nothing (pepto) for the symptoms. The treatment provided no relief.   Diarrhea   This is a new problem. The current episode started today. The problem has been rapidly worsening. Associated symptoms include chills, myalgias and vomiting. Pertinent negatives include no abdominal pain or fever. Nothing aggravates the symptoms. There are no known risk factors. She has tried nothing for the symptoms. The treatment provided no relief.       Constitution: Positive for chills and fatigue. Negative for fever.   HENT: Negative.     Neck: neck negative.   Cardiovascular: Negative.    Respiratory: Negative.     Gastrointestinal:  Positive for nausea, vomiting and diarrhea. Negative for abdominal pain and rectal pain.   Musculoskeletal:  Positive for muscle ache.   Skin: Negative.    Neurological: Negative.    Psychiatric/Behavioral: Negative.        Objective:     Physical Exam   Constitutional: She is oriented to person, place, and time. She appears well-developed.   HENT:   Head: Normocephalic and atraumatic.   Ears:   Right Ear: External ear normal.   Left Ear: External ear normal.   Nose: Nose normal.   Mouth/Throat: Mucous membranes are normal.   Eyes: Conjunctivae and lids are normal.   Neck: Trachea normal. Neck supple.   Cardiovascular: Normal rate, regular rhythm and normal heart " sounds.   Pulmonary/Chest: Effort normal and breath sounds normal. No respiratory distress.   Abdominal: Normal appearance. She exhibits no distension and no mass. Soft. There is no abdominal tenderness. There is no rebound.   Musculoskeletal: Normal range of motion.         General: Normal range of motion.   Neurological: She is alert and oriented to person, place, and time. She has normal strength. She displays no weakness.   Skin: Skin is warm, dry, intact, not diaphoretic and not pale.   Psychiatric: Her speech is normal and behavior is normal. Mood, judgment and thought content normal.   Nursing note and vitals reviewed.      Assessment:     1. Gastroenteritis    2. Vomiting, unspecified vomiting type, unspecified whether nausea present    3. Nausea    4. Nasal congestion        Plan:       Gastroenteritis    Vomiting, unspecified vomiting type, unspecified whether nausea present  -     SARS Coronavirus 2 Antigen, POCT Manual Read  -     POCT Influenza A/B Rapid Antigen    Nausea  -     ondansetron (ZOFRAN-ODT) 4 MG TbDL; Take 1 tablet (4 mg total) by mouth every 8 (eight) hours as needed (nausea).  Dispense: 30 tablet; Refill: 0    Nasal congestion  -     fluticasone propionate (FLONASE) 50 mcg/actuation nasal spray; 1 spray (50 mcg total) by Each Nostril route once daily.  Dispense: 15.8 mL; Refill: 0      COVID: neg  FLU: neg    Discussed medication with patient who acknowledges understanding and is agreeable to POC. Follow up with primary care. Increase fluid intake. Red flags for ER discussed.

## 2024-01-05 ENCOUNTER — PATIENT MESSAGE (OUTPATIENT)
Dept: ADMINISTRATIVE | Facility: HOSPITAL | Age: 59
End: 2024-01-05
Payer: MEDICAID

## 2024-01-05 DIAGNOSIS — S42.292D CLOSED 3-PART FRACTURE OF PROXIMAL HUMERUS WITH ROUTINE HEALING, LEFT: Primary | ICD-10-CM

## 2024-01-11 ENCOUNTER — OFFICE VISIT (OUTPATIENT)
Dept: ORTHOPEDICS | Facility: CLINIC | Age: 59
End: 2024-01-11
Payer: MEDICAID

## 2024-01-11 ENCOUNTER — HOSPITAL ENCOUNTER (OUTPATIENT)
Dept: RADIOLOGY | Facility: HOSPITAL | Age: 59
Discharge: HOME | End: 2024-01-11
Attending: ORTHOPAEDIC SURGERY
Payer: MEDICAID

## 2024-01-11 VITALS — WEIGHT: 135 LBS | RESPIRATION RATE: 18 BRPM | BODY MASS INDEX: 24.84 KG/M2 | HEIGHT: 62 IN

## 2024-01-11 DIAGNOSIS — S42.292D CLOSED 3-PART FRACTURE OF PROXIMAL HUMERUS WITH ROUTINE HEALING, LEFT: Primary | ICD-10-CM

## 2024-01-11 DIAGNOSIS — S42.292D CLOSED 3-PART FRACTURE OF PROXIMAL HUMERUS WITH ROUTINE HEALING, LEFT: ICD-10-CM

## 2024-01-11 PROCEDURE — 99024 POSTOP FOLLOW-UP VISIT: CPT | Mod: S$PBB,,, | Performed by: ORTHOPAEDIC SURGERY

## 2024-01-11 PROCEDURE — 73030 X-RAY EXAM OF SHOULDER: CPT | Mod: TC,PO,LT

## 2024-01-11 PROCEDURE — 73030 X-RAY EXAM OF SHOULDER: CPT | Mod: 26,LT,, | Performed by: RADIOLOGY

## 2024-01-11 PROCEDURE — 3008F BODY MASS INDEX DOCD: CPT | Mod: CPTII,,, | Performed by: ORTHOPAEDIC SURGERY

## 2024-01-11 PROCEDURE — 1159F MED LIST DOCD IN RCRD: CPT | Mod: CPTII,,, | Performed by: ORTHOPAEDIC SURGERY

## 2024-01-11 PROCEDURE — 99213 OFFICE O/P EST LOW 20 MIN: CPT | Mod: PBBFAC,PO | Performed by: ORTHOPAEDIC SURGERY

## 2024-01-11 PROCEDURE — 99999 PR PBB SHADOW E&M-EST. PATIENT-LVL III: CPT | Mod: PBBFAC,,, | Performed by: ORTHOPAEDIC SURGERY

## 2024-01-12 ENCOUNTER — CLINICAL SUPPORT (OUTPATIENT)
Dept: REHABILITATION | Facility: HOSPITAL | Age: 59
End: 2024-01-12
Attending: ORTHOPAEDIC SURGERY
Payer: MEDICAID

## 2024-01-12 DIAGNOSIS — M25.619 DECREASED RANGE OF MOTION OF SHOULDER, UNSPECIFIED LATERALITY: Primary | ICD-10-CM

## 2024-01-12 DIAGNOSIS — S42.292D CLOSED 3-PART FRACTURE OF PROXIMAL HUMERUS WITH ROUTINE HEALING, LEFT: ICD-10-CM

## 2024-01-12 PROCEDURE — 97161 PT EVAL LOW COMPLEX 20 MIN: CPT | Mod: PN

## 2024-01-12 NOTE — PLAN OF CARE
[facility]  Physical Therapy Initial Evaluation    Date: 2024   Name: Leobardo Rueda  Clinic Number: [id number]    Therapy Diagnosis:   Encounter Diagnoses   Name Primary?    Closed 3-part fracture of proximal humerus with routine healing, left     Decreased range of motion of shoulder, unspecified laterality Yes     Physician: Austen Magaña,*    Physician Orders: PT Eval and Treat   Medical Diagnosis from Referral: S42.292D (ICD-10-CM) - Closed 3-part fracture of proximal humerus with routine healing, left   Evaluation Date: 2024  Authorization Period Expiration: 1/10/2025  Plan of Care Expiration: 2024  Visit # / Visits authorized:     Time In: 800 am  Time Out: 850 am  Total Appointment Time (timed & untimed codes): 50 minutes    Precautions: Standard    Subjective   Date of onset: 2023  History of current condition - Leobardo reports: she fell at work and injured her L shoulder. She states since the injury she has been having shoulder and elbow pain. She denies any numbness/tingling down the arm. She states she has been wearing the sling mostly but at homes tries to stay out of it. She states she can move her elbow but her arm hurts when she moves her shoulder. 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 Hopes to get back to work and exercise which consists of free weights. She denies any previous injury to shoulder/neck. She denies any other injuries from fall or LOC.      Medical History:   Past Medical History:   Diagnosis Date    Anxiety     Hypothyroidism        Surgical History:   Leobardo Rueda  has a past surgical history that includes  section (); tummy tuck; breast augmentation (10 years ago); hemmorhiodectomy; and Augmentation of breast.    Medications:   Leobardo has a current medication list which includes the following prescription(s): alprazolam, escitalopram oxalate, fluticasone propionate,  hydrocodone-acetaminophen, hydrocodone-acetaminophen, ondansetron, synthroid, valacyclovir, and vitamin d3.    Allergies:   Review of patient's allergies indicates:  No Known Allergies     Imaging, 1/5/2024: X-ray FINDINGS:  There is a subacute fracture of the proximal humerus including the greater tuberosity.  No detrimental change in alignment.  Incomplete bony bridging.  Relative preservation of joint spaces.  Degenerative disc disease at several midthoracic levels.       Prior Therapy: N  Social History:  lives with their family  Occupation:   Prior Level of Function: I  Current Level of Function: limited LUE function and pain    Pain:  Current 2/10, worst 6/10, best 0/10   Location: left shoulder    Description: Aching  Aggravating Factors: Flexing and Lifting  Easing Factors: rest    Pts goals: decrease pain; return to work/exercises    Objective   Observation: calm and cooperative    Posture:    T-spine: kyphotic   Protracted L scapula    Passive Range of Motion:   Shoulder Left Right   Flexion 80; painful full   ER at 0 25; painful full   ER at 90 NT 90   IR NT 70 at 90 abd      Active Range of Motion:   Apley's Scratch Test: full and pain-free on RUE    Behind Head: N/a   Opp Shoulder: n/a   Behind Back: n/a    Shoulder Left Right   Flexion 45; painful 180   ER at 0 20 45                 UE Strength Testing  (R) UE   (L) UE     Shoulder ER 4/5 Shoulder ER 3+/5   Shoulder IR 4+/5 Shoulder IR 4-/5       Joint Mobility:    (L) posterior GH mobility: limited   (L) inferior GH mobility: limited    Sensation: WNL      Scapular Control/Dyskinesis:    Normal / Subtle / Obvious  Comments    Left  obvious Increased upward rotation               Limitation/Restriction for FOTO Shoulder Survey    Therapist reviewed FOTO scores for Leobardo Rueda on 1/12/2024.   FOTO documents entered into EPIC - see Media section.    Score: 41%  Predicted: 65%       TREATMENT   Treatment Time In: 430pm  Treatment Time Out:  450pm  Total Treatment time (time-based codes) separate from Evaluation: 20 minutes    Leobardo received therapeutic exercises to develop strength, endurance, ROM, flexibility and posture for 12 minutes including:    Seated Scap retraction 2 x 10; 3s holds   Seated Table slides into flexion on slide board 2 x 10   Supine AAROm hand together into flexion; pain-free 2 x 10   Supine AAROM with towel into ER in scaption 2 x 10   Education - HEP / no lifting more than 1lb (coffee cup)    Leobardo received the following manual therapy techniques: Joint mobilizations were applied to the: (L) shoulder for 8 minutes, including:    Manual pendulums   Posterior glide; grade2        Home Exercises and Patient Education Provided    Education provided:   - Home Exercise Program    Written Home Exercises Provided: yes.  Exercises were reviewed and Leobardo was able to demonstrate them prior to the end of the session.  Leobardo demonstrated good  understanding of the education provided.     See EMR under Patient Instructions for exercises provided 1/12/2024.    Assessment   Leobardo is a 59 y.o. female referred to outpatient Physical Therapy with a medical diagnosis of Closed 3-part fracture of proximal humerus with routine healing, left  Pt presents with limited and painful shoulder ROM, UE weakness, abnormal posture, and functional limitations of difficulty performing ADL's and work activities. Leobardo would benefit from skilled PT to address these Impairments ans facilitate a return to PLOF. Pt reminded to avoid lifting more than coffee cup and to not push past pain with ROM exercises to allow for healing. Pt agreeable.     Pt prognosis is Good.   Pt will benefit from skilled outpatient Physical Therapy to address the deficits stated above and in the chart below, provide pt/family education, and to maximize pt's level of independence.     Plan of care discussed with patient: Yes  Pt's spiritual, cultural and educational needs considered  and patient is agreeable to the plan of care and goals as stated below:     Anticipated Barriers for therapy: none    Medical Necessity is demonstrated by the following  History  Co-morbidities and personal factors that may impact the plan of care Co-morbidities:   anxiety and hypothyroidism    Personal Factors:   no deficits     low   Examination  Body Structures and Functions, activity limitations and participation restrictions that may impact the plan of care Body Regions:   upper extremities  trunk    Body Systems:    gross symmetry  ROM  strength  gross coordinated movement    Participation Restrictions:   work    Activity limitations:   no deficits    General Tasks and Commands  no deficits    Communication  no deficits    Mobility  lifting and carrying objects  moving around using equipment (WC)  driving (bike, car, motorcycle)    Self care  caring for body parts (brushing teeth, shaving, grooming)  toileting  dressing    Domestic Life  doing house work (cleaning house, washing dishes, laundry)  assisting others    Interactions/Relationships  no deficits    Life Areas  employment    Community and Social Life  community life  recreation and leisure         low   Clinical Presentation stable and uncomplicated low   Decision Making/ Complexity Score: low       GOALS:   Short Term Goals:  4 weeks  1.Report decreased shoulder pain < / =  5/10  to increase tolerance for work - Progressing NOT MET  2. Increase AROM to 90 flexion and 30 ER at 0 abd -Progressing NOT MET  3. Increased strength by 1/3 MMT grade in shoulder strengthe to increase tolerance for ADL and work activities.- Progressing NOT MET  4. Pt to tolerate HEP to improve ROM and independence with ADL's- Progressing NOT MET     Long Term Goals: 12 weeks  1.Report decreased shoulder pain  < / =  2 /10  to increase tolerance for work- -Progressing NOT MET  2.Increase AROM to full pain free-Progressing NOT MET  3.Increase strength to >/= 4/5 in shoulder  strength to increase tolerance for ADL and work activities.-Progressing NOT MET  4. Pt goal: return to gym activities pain free -Progressing NOT MET    Plan   Plan of care Certification: 1/12/2024 to 4/5/2024.    Outpatient Physical Therapy 2 times weekly for 8 weeks and 1x/week for 4 weeks for a total of 12 weeks to include the following interventions: Manual Therapy, Moist Heat/ Ice, Neuromuscular Re-ed, Patient Education, Self Care, Therapeutic Activities, and Therapeutic Exercise.     John Clarke, PT

## 2024-01-19 ENCOUNTER — CLINICAL SUPPORT (OUTPATIENT)
Dept: OBSTETRICS AND GYNECOLOGY | Facility: CLINIC | Age: 59
End: 2024-01-19
Payer: MEDICAID

## 2024-01-19 ENCOUNTER — CLINICAL SUPPORT (OUTPATIENT)
Dept: REHABILITATION | Facility: HOSPITAL | Age: 59
End: 2024-01-19
Payer: MEDICAID

## 2024-01-19 DIAGNOSIS — M25.619 DECREASED RANGE OF MOTION OF SHOULDER, UNSPECIFIED LATERALITY: Primary | ICD-10-CM

## 2024-01-19 DIAGNOSIS — N95.1 SYMPTOMATIC MENOPAUSAL OR FEMALE CLIMACTERIC STATES: Primary | ICD-10-CM

## 2024-01-19 PROCEDURE — 96372 THER/PROPH/DIAG INJ SC/IM: CPT | Mod: PBBFAC

## 2024-01-19 PROCEDURE — 97110 THERAPEUTIC EXERCISES: CPT | Mod: PN

## 2024-01-19 PROCEDURE — 99999PBSHW PR PBB SHADOW TECHNICAL ONLY FILED TO HB: Mod: PBBFAC,,,

## 2024-01-19 RX ORDER — TESTOSTERONE CYPIONATE 200 MG/ML
76 INJECTION, SOLUTION INTRAMUSCULAR
Status: SHIPPED | OUTPATIENT
Start: 2024-01-19 | End: 2024-07-05

## 2024-01-19 RX ADMIN — TESTOSTERONE CYPIONATE 76 MG: 200 INJECTION, SOLUTION INTRAMUSCULAR at 11:01

## 2024-01-19 NOTE — PROGRESS NOTES
Physical Therapy Treatment Note     Name: Leobardo Rueda  Clinic Number: [id number]    Therapy Diagnosis:   Encounter Diagnosis   Name Primary?    Decreased range of motion of shoulder, unspecified laterality Yes     Physician: Austen Magaña,*    Visit Date: 1/19/2024    Physician Orders: PT Eval and Treat   Medical Diagnosis from Referral: S42.292D (ICD-10-CM) - Closed 3-part fracture of proximal humerus with routine healing, left   Evaluation Date: 1/12/2024  Authorization Period Expiration: 12/31/2024  Plan of Care Expiration: 4/5/2024  Visit # / Visits authorized: 1/20     Time In: 810 am  Time Out: 853 am  Total Appointment Time (timed & untimed codes): 43 minutes     Precautions: Standard    Subjective     Pt reports: she is still sore but has been trying to move it.    She was compliant with home exercise program.  Response to previous treatment: felt fine  Functional change: ongoing    Pain: 3/10  Location: left shoulder      Objective     AROM: 45 degrees in flexion initial, 75 degrees post session    Leobardo received therapeutic exercises to develop strength, endurance, ROM, flexibility and posture for 35 minutes including:    Supine AROM on towel support; elbow flex/ext x 20  Supine Submax Isometrics ER/IR in scaption 3 x 8; 5s each   Seated Scap clocks 2 x 10; Fwd/Bwd  Seated Pulley's in scaption 3 minutes   Seated Table slides into flexion on slide board 2 x 10   ER isometrics on wall 2 x 8; 5s holds; pain-free  IR isometrics on wall 2 x 8; 5s holds; pain-free  Standing Bicep curls 1# 3 x 8   Education - HEP / no lifting more than 1lb (coffee cup)     Leobardo received the following manual therapy techniques: Joint mobilizations were applied to the: (L) shoulder for 8 minutes, including:    Lateral distraction   Manual pendulums   Posterior glide; grade2      Home Exercises Provided and Patient Education Provided     Education provided:   - HEP  - no lifting heavier than a coffee cup  - pain-free  submax isometrics ER/IR on wall     Written Home Exercises Provided: yes.  Exercises were reviewed and Leobardo was able to demonstrate them prior to the end of the session.  Leobardo demonstrated good  understanding of the education provided.     See EMR under Patient Instructions for exercises provided 1/19/2024.    Assessment     Leobardo tolerated tx well today with improvement in AROM to 75 degrees within session. She continues to guard and report pain at end-range as expected. We added in submax isometrics today and she reported no pain with this up to 50%. We added this to HEP. Print out provided.      Leobardo Is progressing well towards her goals.   Pt prognosis is Good.     Pt will continue to benefit from skilled outpatient physical therapy to address the deficits listed in the problem list box on initial evaluation, provide pt/family education and to maximize pt's level of independence in the home and community environment.     Pt's spiritual, cultural and educational needs considered and pt agreeable to plan of care and goals.     Anticipated barriers to physical therapy: none       GOALS:   Short Term Goals:  4 weeks  1.Report decreased shoulder pain < / =  5/10  to increase tolerance for work - Progressing NOT MET  2. Increase AROM to 90 flexion and 30 ER at 0 abd -Progressing NOT MET  3. Increased strength by 1/3 MMT grade in shoulder strengthe to increase tolerance for ADL and work activities.- Progressing NOT MET  4. Pt to tolerate HEP to improve ROM and independence with ADL's- Progressing NOT MET     Long Term Goals: 12 weeks  1.Report decreased shoulder pain  < / =  2 /10  to increase tolerance for work- -Progressing NOT MET  2.Increase AROM to full pain free-Progressing NOT MET  3.Increase strength to >/= 4/5 in shoulder strength to increase tolerance for ADL and work activities.-Progressing NOT MET  4. Pt goal: return to gym activities pain free -Progressing NOT MET     Plan   Plan of care  Certification: 1/12/2024 to 4/5/2024.    John Clarke, PT,DPT, OCS

## 2024-01-22 ENCOUNTER — CLINICAL SUPPORT (OUTPATIENT)
Dept: REHABILITATION | Facility: HOSPITAL | Age: 59
End: 2024-01-22
Payer: MEDICAID

## 2024-01-22 DIAGNOSIS — M25.619 DECREASED RANGE OF MOTION OF SHOULDER, UNSPECIFIED LATERALITY: Primary | ICD-10-CM

## 2024-01-22 PROCEDURE — 97110 THERAPEUTIC EXERCISES: CPT | Mod: PN,CQ

## 2024-01-22 NOTE — PROGRESS NOTES
Physical Therapy Treatment Note     Name: Leobardo Rueda  Clinic Number: [id number]    Therapy Diagnosis:   Encounter Diagnosis   Name Primary?    Decreased range of motion of shoulder, unspecified laterality Yes     Physician: Austen Magaña,*    Visit Date: 1/22/2024    Physician Orders: PT Eval and Treat   Medical Diagnosis from Referral: S42.292D (ICD-10-CM) - Closed 3-part fracture of proximal humerus with routine healing, left   Evaluation Date: 1/12/2024  Authorization Period Expiration: 12/31/2024  Plan of Care Expiration: 4/5/2024  Visit # / Visits authorized: 2/20     Time In: 830 am  Time Out: 0915 am  Total Appointment Time (timed & untimed codes): 43 minutes     Precautions: Standard    Subjective     Pt reports: super achy feeling. She was able to put her hair up and put earrings on but had to put her head down by her knees to put her hair up. Attempted to flip a light switch at home but unable to lift her arm high enough     She was compliant with home exercise program.  Response to previous treatment: felt fine  Functional change: ongoing    Pain: 5/10  Location: left shoulder      Objective     AROM: 45 degrees in flexion initial, 75 degrees post session    Leobardo received therapeutic exercises to develop strength, endurance, ROM, flexibility and posture for 35 minutes including:    Supine AROM on towel support; elbow flex/ext x 20  Supine Submax Isometrics ER/IR in scaption 3 x 8; 5s each   Seated Scap clocks 2 x 10; Fwd/Bwd  Seated Pulley's in scaption 3 minutes   Seated Table slides into flexion on slide board 2 x 10   ER isometrics on wall 2 x 8; 5s holds; pain-free  IR isometrics on wall 2 x 8; 5s holds; pain-free  Standing Bicep curls 1# 3 x 8   Finger ladder x 10  Education - HEP / no lifting more than 1lb (coffee cup)     Leobardo received the following manual therapy techniques: Joint mobilizations were applied to the: (L) shoulder for 8 minutes, including:    Lateral distraction    Manual pendulums   Posterior glide; grade2      Home Exercises Provided and Patient Education Provided     Education provided:   - HEP  - no lifting heavier than a coffee cup  - pain-free submax isometrics ER/IR on wall     Written Home Exercises Provided: yes.  Exercises were reviewed and Leobardo was able to demonstrate them prior to the end of the session.  Leobardo demonstrated good  understanding of the education provided.     See EMR under Patient Instructions for exercises provided 1/19/2024.    Assessment     Leobardo noted with good tolerance to treatment this date. Continues with limited range of motion and pain at end ranges. Verbalizes tenderness to palpation to sub scap region therefore included scapular mobs. Leobardo was able to get to approx 90 degrees flexion on finger ladder but unable to sustain for multiple repetitions due to fatigue. Leobardo will continue to benefit from skilled Physical Therapy to maximize range of motion and strength gains able.     Leobardo Is progressing well towards her goals.   Pt prognosis is Good.     Pt will continue to benefit from skilled outpatient physical therapy to address the deficits listed in the problem list box on initial evaluation, provide pt/family education and to maximize pt's level of independence in the home and community environment.     Pt's spiritual, cultural and educational needs considered and pt agreeable to plan of care and goals.     Anticipated barriers to physical therapy: none       GOALS:   Short Term Goals:  4 weeks  1.Report decreased shoulder pain < / =  5/10  to increase tolerance for work - Progressing NOT MET  2. Increase AROM to 90 flexion and 30 ER at 0 abd -Progressing NOT MET  3. Increased strength by 1/3 MMT grade in shoulder strengthe to increase tolerance for ADL and work activities.- Progressing NOT MET  4. Pt to tolerate HEP to improve ROM and independence with ADL's- Progressing NOT MET     Long Term Goals: 12 weeks  1.Report  decreased shoulder pain  < / =  2 /10  to increase tolerance for work- -Progressing NOT MET  2.Increase AROM to full pain free-Progressing NOT MET  3.Increase strength to >/= 4/5 in shoulder strength to increase tolerance for ADL and work activities.-Progressing NOT MET  4. Pt goal: return to gym activities pain free -Progressing NOT MET     Plan   Plan of care Certification: 1/12/2024 to 4/5/2024.    Idalia Talbert, PTA

## 2024-01-25 ENCOUNTER — CLINICAL SUPPORT (OUTPATIENT)
Dept: REHABILITATION | Facility: HOSPITAL | Age: 59
End: 2024-01-25
Payer: MEDICAID

## 2024-01-25 DIAGNOSIS — M25.619 DECREASED RANGE OF MOTION OF SHOULDER, UNSPECIFIED LATERALITY: Primary | ICD-10-CM

## 2024-01-25 PROCEDURE — 97110 THERAPEUTIC EXERCISES: CPT | Mod: PN

## 2024-01-25 NOTE — PROGRESS NOTES
Physical Therapy Treatment Note     Name: Leobardo Rueda  Clinic Number: [id number]    Therapy Diagnosis:   Encounter Diagnosis   Name Primary?    Decreased range of motion of shoulder, unspecified laterality Yes       Physician: Austen Magaña,*    Visit Date: 1/25/2024    Physician Orders: PT Eval and Treat   Medical Diagnosis from Referral: S42.292D (ICD-10-CM) - Closed 3-part fracture of proximal humerus with routine healing, left   Evaluation Date: 1/12/2024  Authorization Period Expiration: 12/31/2024  Plan of Care Expiration: 4/5/2024  Visit # / Visits authorized: 3/20     Time In: 800 am  Time Out: 853 am  Total Appointment Time (timed & untimed codes): 53 minutes     Precautions: Standard    Subjective     Pt reports: she is doing more but feeling achy at the end of the day.     She was compliant with home exercise program.  Response to previous treatment: felt fine  Functional change: ongoing    Pain: 3/10  Location: left shoulder      Objective     AROM: 75 degrees in flexion initial, 90 degrees post session    Leobardo received therapeutic exercises to develop strength, endurance, ROM, flexibility and posture for 43 minutes including:    Supine resting at 45 abd on towel AAROM ER with T-bar x 30; 5s holds   Supine Hands together AAROM into flexion 3 x 8   Supine Submax Isometrics ER/IR in scaption 3 x 8; 5s each   Seated Arm prop at 90 abd x 3 minutes  Seated Pulley's in scaption 3 minutes   UBE 3 min Fwd/3min Bwd Level1  for ROM  ER isometrics on wall 3 x 8; 5s holds; pain-free  IR isometrics on wall 3 x 8; 5s holds; pain-free  Finger ladder x 10  Standing Shoulder Walkaway on mat 10x10s holds  Education - HEP / no lifting more than 1lb (coffee cup)     Leobardo received the following manual therapy techniques: Joint mobilizations were applied to the: (L) shoulder for 10 minutes, including:    Lateral distraction   Manual pendulums   Posterior glide; grade3  Inferior glide; grade 2     Home  Exercises Provided and Patient Education Provided     Education provided:   - HEP  - no lifting heavier than a coffee cup  - pain-free submax isometrics ER/IR on wall     Written Home Exercises Provided: yes.  Exercises were reviewed and Leobardo was able to demonstrate them prior to the end of the session.  Leobardo demonstrated good  understanding of the education provided.     See EMR under Patient Instructions for exercises provided 1/19/2024.    Assessment     Leobardo progressing well. She continues to report soreness as expected with repetitive AROM/AAROM. Encouraged her to use red/yellow/green light analogy at home to manage shoulder discomfort. Pt verbalized understanding. Flexion to 90 degrees AROM by end of session.     Leobardo Is progressing well towards her goals.   Pt prognosis is Good.     Pt will continue to benefit from skilled outpatient physical therapy to address the deficits listed in the problem list box on initial evaluation, provide pt/family education and to maximize pt's level of independence in the home and community environment.     Pt's spiritual, cultural and educational needs considered and pt agreeable to plan of care and goals.     Anticipated barriers to physical therapy: none       GOALS:   Short Term Goals:  4 weeks  1.Report decreased shoulder pain < / =  5/10  to increase tolerance for work - Progressing NOT MET  2. Increase AROM to 90 flexion and 30 ER at 0 abd -Progressing NOT MET  3. Increased strength by 1/3 MMT grade in shoulder strengthe to increase tolerance for ADL and work activities.- Progressing NOT MET  4. Pt to tolerate HEP to improve ROM and independence with ADL's- Progressing NOT MET     Long Term Goals: 12 weeks  1.Report decreased shoulder pain  < / =  2 /10  to increase tolerance for work- -Progressing NOT MET  2.Increase AROM to full pain free-Progressing NOT MET  3.Increase strength to >/= 4/5 in shoulder strength to increase tolerance for ADL and work  activities.-Progressing NOT MET  4. Pt goal: return to gym activities pain free -Progressing NOT MET     Plan   Plan of care Certification: 1/12/2024 to 4/5/2024.    John Clarke, PT

## 2024-01-29 ENCOUNTER — CLINICAL SUPPORT (OUTPATIENT)
Dept: REHABILITATION | Facility: HOSPITAL | Age: 59
End: 2024-01-29
Payer: MEDICAID

## 2024-01-29 DIAGNOSIS — M25.619 DECREASED RANGE OF MOTION OF SHOULDER, UNSPECIFIED LATERALITY: Primary | ICD-10-CM

## 2024-01-29 PROCEDURE — 97110 THERAPEUTIC EXERCISES: CPT | Mod: PN,CQ

## 2024-01-29 NOTE — PROGRESS NOTES
Physical Therapy Treatment Note     Name: Leobardo Rueda  Clinic Number: [id number]    Therapy Diagnosis:   No diagnosis found.      Physician: Austen Magaña,*    Visit Date: 1/29/2024    Physician Orders: PT Eval and Treat   Medical Diagnosis from Referral: S42.292D (ICD-10-CM) - Closed 3-part fracture of proximal humerus with routine healing, left   Evaluation Date: 1/12/2024  Authorization Period Expiration: 12/31/2024  Plan of Care Expiration: 4/5/2024  Visit # / Visits authorized: 4/20     Time In: 800 am  Time Out: 853 am  Total Appointment Time (timed & untimed codes): 53 minutes     Precautions: Standard    Subjective     Pt reports: she feels like her arm is moving better and post treatment she verbalized she had an easier time with UBE.      She was compliant with home exercise program.  Response to previous treatment: felt fine  Functional change: ongoing    Pain: 3/10  Location: left shoulder      Objective     AROM: 75 degrees in flexion initial, 90 degrees post session    Leobardo received therapeutic exercises to develop strength, endurance, ROM, flexibility and posture for 43 minutes including:    Supine resting at 45 abd on towel AAROM ER with T-bar x 30; 5s holds   Supine Hands together AAROM into flexion 3 x 8   Supine Submax Isometrics ER/IR in scaption 3 x 8; 5s each   Seated Arm prop at 90 abd x 3 minutes  Seated Pulley's in scaption 3 minutes   UBE 3 min Fwd/3min Bwd Level1  for ROM  ER isometrics on wall 3 x 8; 5s holds; pain-free  IR isometrics on wall 3 x 8; 5s holds; pain-free  Finger ladder x 10  Standing Shoulder Walkaway on mat 10x10s holds  Education - HEP / no lifting more than 1lb (coffee cup)     Leobardo received the following manual therapy techniques: Joint mobilizations were applied to the: (L) shoulder for 10 minutes, including:    Lateral distraction   Manual pendulums   Posterior glide; grade3  Inferior glide; grade 2     Home Exercises Provided and Patient Education  Provided     Education provided:   - HEP  - no lifting heavier than a coffee cup  - pain-free submax isometrics ER/IR on wall     Written Home Exercises Provided: yes.  Exercises were reviewed and Leobardo was able to demonstrate them prior to the end of the session.  Leobardo demonstrated good  understanding of the education provided.     See EMR under Patient Instructions for exercises provided 1/19/2024.    Assessment     Leobardo tolerated treatment well. Experiences muscle fatigue as expected. Range of motion continues to be 90 degrees post treatment. Will continue to benefit from skilled Physical Therapy to improve range of motion and strength as able.      Leobardo Is progressing well towards her goals.   Pt prognosis is Good.     Pt will continue to benefit from skilled outpatient physical therapy to address the deficits listed in the problem list box on initial evaluation, provide pt/family education and to maximize pt's level of independence in the home and community environment.     Pt's spiritual, cultural and educational needs considered and pt agreeable to plan of care and goals.     Anticipated barriers to physical therapy: none       GOALS:   Short Term Goals:  4 weeks  1.Report decreased shoulder pain < / =  5/10  to increase tolerance for work - Progressing NOT MET  2. Increase AROM to 90 flexion and 30 ER at 0 abd -Progressing NOT MET  3. Increased strength by 1/3 MMT grade in shoulder strengthe to increase tolerance for ADL and work activities.- Progressing NOT MET  4. Pt to tolerate HEP to improve ROM and independence with ADL's- Progressing NOT MET     Long Term Goals: 12 weeks  1.Report decreased shoulder pain  < / =  2 /10  to increase tolerance for work- -Progressing NOT MET  2.Increase AROM to full pain free-Progressing NOT MET  3.Increase strength to >/= 4/5 in shoulder strength to increase tolerance for ADL and work activities.-Progressing NOT MET  4. Pt goal: return to gym activities pain free  -Progressing NOT MET     Plan   Plan of care Certification: 1/12/2024 to 4/5/2024.    Idalia Talbert, PTA

## 2024-02-01 ENCOUNTER — CLINICAL SUPPORT (OUTPATIENT)
Dept: REHABILITATION | Facility: HOSPITAL | Age: 59
End: 2024-02-01
Payer: MEDICAID

## 2024-02-01 DIAGNOSIS — M25.619 DECREASED RANGE OF MOTION OF SHOULDER, UNSPECIFIED LATERALITY: Primary | ICD-10-CM

## 2024-02-01 PROCEDURE — 97110 THERAPEUTIC EXERCISES: CPT | Mod: PN,CQ

## 2024-02-01 NOTE — PROGRESS NOTES
"  Physical Therapy Treatment Note     Name: Leobardo Rueda  Clinic Number: [id number]    Therapy Diagnosis:   Encounter Diagnosis   Name Primary?    Decreased range of motion of shoulder, unspecified laterality Yes         Physician: Austen Magaña,*    Visit Date: 2/1/2024    Physician Orders: PT Eval and Treat   Medical Diagnosis from Referral: S42.292D (ICD-10-CM) - Closed 3-part fracture of proximal humerus with routine healing, left   Evaluation Date: 1/12/2024  Authorization Period Expiration: 12/31/2024  Plan of Care Expiration: 4/5/2024  Visit # / Visits authorized: 5/20     Time In: 800 am  Time Out: 853 am  Total Appointment Time (timed & untimed codes): 53 minutes     Precautions: Standard    Subjective     Pt reports: "I think I over did it yesterday because its really sore today." 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 Had a really busy day.     She was compliant with home exercise program.  Response to previous treatment: felt fine  Functional change: ongoing    Pain: 3/10  Location: left shoulder      Objective     AROM: 75 degrees in flexion initial, 90 degrees post session    Leobardo received therapeutic exercises to develop strength, endurance, ROM, flexibility and posture for 43 minutes including:    Supine resting at 45 abd on towel AAROM ER with T-bar x 30; 5s holds   Supine Hands together AAROM into flexion 3 x 8   Supine Submax Isometrics ER/IR in scaption 3 x 8; 5s each   Seated Arm prop at 90 abd x 3 minutes  Seated Pulley's in scaption 3 minutes   UBE 3 min Fwd/3min Bwd Level1  for ROM  ER isometrics on wall 3 x 8; 5s holds; pain-free  IR isometrics on wall 3 x 8; 5s holds; pain-free  Finger ladder x 10  Standing Shoulder Walkaway on mat 10x10s holds  Education - HEP / no lifting more than 1lb (coffee cup)     Leobardo received the following manual therapy techniques: Joint mobilizations were applied to the: (L) shoulder for 10 minutes, including:    Lateral " distraction   Manual pendulums   Posterior glide; grade3  Inferior glide; grade 2     Home Exercises Provided and Patient Education Provided     Education provided:   - HEP  - no lifting heavier than a coffee cup  - pain-free submax isometrics ER/IR on wall     Written Home Exercises Provided: yes.  Exercises were reviewed and Leobardo was able to demonstrate them prior to the end of the session.  Leobardo demonstrated good  understanding of the education provided.     See EMR under Patient Instructions for exercises provided 1/19/2024.    Assessment     Leobardo arrives to clinic with more symptomology this date. She struggled more with exercises today possibly due to subjective reporting. Continued focus on improving range of motion and strength to tolerance. Will progress as able.     Leobardo Is progressing well towards her goals.   Pt prognosis is Good.     Pt will continue to benefit from skilled outpatient physical therapy to address the deficits listed in the problem list box on initial evaluation, provide pt/family education and to maximize pt's level of independence in the home and community environment.     Pt's spiritual, cultural and educational needs considered and pt agreeable to plan of care and goals.     Anticipated barriers to physical therapy: none       GOALS:   Short Term Goals:  4 weeks  1.Report decreased shoulder pain < / =  5/10  to increase tolerance for work - Progressing NOT MET  2. Increase AROM to 90 flexion and 30 ER at 0 abd -Progressing NOT MET  3. Increased strength by 1/3 MMT grade in shoulder strengthe to increase tolerance for ADL and work activities.- Progressing NOT MET  4. Pt to tolerate HEP to improve ROM and independence with ADL's- Progressing NOT MET     Long Term Goals: 12 weeks  1.Report decreased shoulder pain  < / =  2 /10  to increase tolerance for work- -Progressing NOT MET  2.Increase AROM to full pain free-Progressing NOT MET  3.Increase strength to >/= 4/5 in shoulder  strength to increase tolerance for ADL and work activities.-Progressing NOT MET  4. Pt goal: return to gym activities pain free -Progressing NOT MET     Plan   Plan of care Certification: 1/12/2024 to 4/5/2024.    Idalia Talbert, PTA

## 2024-02-02 DIAGNOSIS — S42.292D CLOSED 3-PART FRACTURE OF PROXIMAL HUMERUS WITH ROUTINE HEALING, LEFT: Primary | ICD-10-CM

## 2024-02-05 ENCOUNTER — OFFICE VISIT (OUTPATIENT)
Dept: ORTHOPEDICS | Facility: CLINIC | Age: 59
End: 2024-02-05
Payer: MEDICAID

## 2024-02-05 ENCOUNTER — CLINICAL SUPPORT (OUTPATIENT)
Dept: REHABILITATION | Facility: HOSPITAL | Age: 59
End: 2024-02-05
Payer: MEDICAID

## 2024-02-05 ENCOUNTER — HOSPITAL ENCOUNTER (OUTPATIENT)
Dept: RADIOLOGY | Facility: HOSPITAL | Age: 59
Discharge: HOME | End: 2024-02-05
Attending: ORTHOPAEDIC SURGERY
Payer: MEDICAID

## 2024-02-05 VITALS — HEIGHT: 62 IN | BODY MASS INDEX: 24.84 KG/M2 | WEIGHT: 135 LBS

## 2024-02-05 DIAGNOSIS — M25.619 DECREASED RANGE OF MOTION OF SHOULDER, UNSPECIFIED LATERALITY: Primary | ICD-10-CM

## 2024-02-05 DIAGNOSIS — S42.292D CLOSED 3-PART FRACTURE OF PROXIMAL HUMERUS WITH ROUTINE HEALING, LEFT: Primary | ICD-10-CM

## 2024-02-05 DIAGNOSIS — S42.292D CLOSED 3-PART FRACTURE OF PROXIMAL HUMERUS WITH ROUTINE HEALING, LEFT: ICD-10-CM

## 2024-02-05 PROCEDURE — 99024 POSTOP FOLLOW-UP VISIT: CPT | Mod: S$PBB,,, | Performed by: ORTHOPAEDIC SURGERY

## 2024-02-05 PROCEDURE — 99999 PR PBB SHADOW E&M-EST. PATIENT-LVL II: CPT | Mod: PBBFAC,,, | Performed by: ORTHOPAEDIC SURGERY

## 2024-02-05 PROCEDURE — 73030 X-RAY EXAM OF SHOULDER: CPT | Mod: TC,LT

## 2024-02-05 PROCEDURE — 3008F BODY MASS INDEX DOCD: CPT | Mod: CPTII,,, | Performed by: ORTHOPAEDIC SURGERY

## 2024-02-05 PROCEDURE — 99212 OFFICE O/P EST SF 10 MIN: CPT | Mod: PBBFAC,PO | Performed by: ORTHOPAEDIC SURGERY

## 2024-02-05 PROCEDURE — 73030 X-RAY EXAM OF SHOULDER: CPT | Mod: 26,LT,, | Performed by: RADIOLOGY

## 2024-02-05 PROCEDURE — 97110 THERAPEUTIC EXERCISES: CPT | Mod: PN,CQ

## 2024-02-05 PROCEDURE — 1160F RVW MEDS BY RX/DR IN RCRD: CPT | Mod: CPTII,,, | Performed by: ORTHOPAEDIC SURGERY

## 2024-02-05 PROCEDURE — 1159F MED LIST DOCD IN RCRD: CPT | Mod: CPTII,,, | Performed by: ORTHOPAEDIC SURGERY

## 2024-02-05 NOTE — PROGRESS NOTES
Past Medical History:   Diagnosis Date    Anxiety     Hypothyroidism        Past Surgical History:   Procedure Laterality Date    AUGMENTATION OF BREAST      breast augmentation  10 years ago     SECTION      hemmorhiodectomy      tummy tuck      10 years ago       Current Outpatient Medications   Medication Sig    ALPRAZolam (XANAX) 0.5 MG tablet Take 1 tablet (0.5 mg total) by mouth nightly as needed for Anxiety.    EScitalopram oxalate (LEXAPRO) 10 MG tablet Take 1 tablet (10 mg total) by mouth once daily.    fluticasone propionate (FLONASE) 50 mcg/actuation nasal spray 1 spray (50 mcg total) by Each Nostril route once daily.    HYDROcodone-acetaminophen (NORCO) 5-325 mg per tablet Take 1 tablet by mouth every 4 (four) hours as needed for Pain.    HYDROcodone-acetaminophen (NORCO) 5-325 mg per tablet Take 1 tablet by mouth every 6 (six) hours as needed for Pain.    SYNTHROID 75 mcg tablet Take 75 mcg by mouth before breakfast.    valACYclovir (VALTREX) 1000 MG tablet Take 2,000 mg by mouth 2 (two) times daily.    VITAMIN D3 25 mcg (1,000 unit) tablet Take 1,000 Units by mouth once daily.     Current Facility-Administered Medications   Medication    testosterone cypionate injection 76 mg       Review of patient's allergies indicates:  No Known Allergies    Family History   Problem Relation Age of Onset    Diabetes Mother     COPD Mother     Anxiety disorder Son     Colon cancer Paternal Grandfather        Social History     Socioeconomic History    Marital status:    Tobacco Use    Smoking status: Never    Smokeless tobacco: Never   Substance and Sexual Activity    Alcohol use: No    Drug use: No    Sexual activity: Yes     Partners: Male     Birth control/protection: None     Social Determinants of Health     Food Insecurity: No Food Insecurity (2022)    Hunger Vital Sign     Worried About Running Out of Food in the Last Year: Never true     Ran Out of Food in the Last Year: Never true    Transportation Needs: No Transportation Needs (6/14/2022)    PRAPARE - Transportation     Lack of Transportation (Medical): No     Lack of Transportation (Non-Medical): No   Physical Activity: Sufficiently Active (6/14/2022)    Exercise Vital Sign     Days of Exercise per Week: 5 days     Minutes of Exercise per Session: 30 min   Stress: Stress Concern Present (6/14/2022)    [employer] - Occupational Stress Questionnaire     Feeling of Stress : To some extent   Social Connections: Unknown (6/14/2022)    Social Connection and Isolation Panel [NHANES]     Frequency of Communication with Friends and Family: More than three times a week     Frequency of Social Gatherings with Friends and Family: More than three times a week     Active Member of Clubs or Organizations: Yes     Attends Club or Organization Meetings: More than 4 times per year     Marital Status:    Housing Stability: High Risk (6/14/2022)    Housing Stability Vital Sign     Unable to Pay for Housing in the Last Year: Yes     Unstable Housing in the Last Year: No       Chief Complaint:   Chief Complaint   Patient presents with    Left Shoulder - Pain       History of present illness:  59-year-old female seen for emergency room follow-up after a fall on December 16th where she injured her left shoulder.  Patient tripped over a fan and landed onto her left shoulder.  Pain is 3/10.  Doing the physical therapy.  Making slow progress.      Review of Systems:  Musculoskeletal:  See HPI      Physical Examination:    Vital Signs:    There were no vitals filed for this visit.      Body mass index is 24.69 kg/m².    This a well-developed, well nourished patient in no acute distress.  They are alert and oriented and cooperative to examination.  Pt. walks without an antalgic gait.      Examination left shoulder shows mild swelling.  Patient is able to wiggle her fingers and thumb.  Brisk capillary refill and intact light touch  sensation in all digits.  Forward flexion of about 105° with external rotation of 70°.    X-rays:  X-rays left shoulder s are ordered and review which show a 3 part proximal humerus fracture in acceptable alignment with minimal displacement but no significant change from previous.  Early callus formation noted.     Assessment::  Left 3 part proximal humerus fracture    Plan:  Reviewed the findings with her today.  Continue the physical therapy.  I will see her back in 6 weeks with another x-ray of the left shoulder    All previous pertinent notes including ER visits, physical therapy visits, other orthopedic visits as well as other care for the same musculoskeletal problem were reviewed.  All pertinent lab values and previous imaging was reviewed pertinent to the current visit.    This note was created using [employer] voice recognition software that occasionally misinterpreted phrases or words.    Consult note is delivered via Epic messaging service.

## 2024-02-05 NOTE — PROGRESS NOTES
"  Physical Therapy Treatment Note     Name: Leobardo Rueda  Clinic Number: [id number]    Therapy Diagnosis:   Encounter Diagnosis   Name Primary?    Decreased range of motion of shoulder, unspecified laterality Yes         Physician: Austen Magaña,*    Visit Date: 2/5/2024    Physician Orders: PT Eval and Treat   Medical Diagnosis from Referral: S42.292D (ICD-10-CM) - Closed 3-part fracture of proximal humerus with routine healing, left   Evaluation Date: 1/12/2024  Authorization Period Expiration: 12/31/2024  Plan of Care Expiration: 4/5/2024  Visit # / Visits authorized: 6/20     Time In: 0830 am  Time Out: 0930 am  Total Appointment Time (timed & untimed codes): 53 minutes     Precautions: Standard    Subjective     Pt reports: "It feels pretty not terrible this morning." Has an appointment with her ortho doctor at 1:45 and will be getting x-rays done today as well.     She was compliant with home exercise program.  Response to previous treatment: felt fine  Functional change: ongoing    Pain: 3/10  Location: left shoulder      Objective     AROM: 75 degrees in flexion initial, 90 degrees post session    Leobardo received therapeutic exercises to develop strength, endurance, ROM, flexibility and posture for 43 minutes including:    Supine resting at 45 abd on towel AAROM ER with T-bar x 30; 5s holds   Supine Hands together AAROM into flexion 3 x 8   Supine short arm flexion to 90 degrees 2 x 10 repetitions   Supine Submax Isometrics ER/IR in scaption 3 x 8; 5s each   Seated Arm prop at 90 abd x 3 minutes  Seated Pulley's in scaption 3 minutes   UBE 3 min Fwd/3min Bwd Level1  for ROM  ER isometrics on wall 3 x 8; 5s holds; pain-free  IR isometrics on wall 3 x 8; 5s holds; pain-free  Finger ladder x 10  Standing Shoulder Walkaway on mat 10x10s holds  Education - HEP / no lifting more than 1lb (coffee cup)     Leobardo received the following manual therapy techniques: Joint mobilizations were applied to the: (L) " shoulder for 10 minutes, including:    Lateral distraction   Manual pendulums   Posterior glide; grade3  Inferior glide; grade 2     Home Exercises Provided and Patient Education Provided     Education provided:   - HEP  - no lifting heavier than a coffee cup  - pain-free submax isometrics ER/IR on wall     Written Home Exercises Provided: yes.  Exercises were reviewed and Leobardo was able to demonstrate them prior to the end of the session.  Leobardo demonstrated good  understanding of the education provided.     See EMR under Patient Instructions for exercises provided 1/19/2024.    Assessment     Leobardo was able to incorporate active short arm flexion to about 90 degrees today. Range of motion continues to be limited to 90 degrees of flexion post manual. Will progress as able.     Leobardo Is progressing well towards her goals.   Pt prognosis is Good.     Pt will continue to benefit from skilled outpatient physical therapy to address the deficits listed in the problem list box on initial evaluation, provide pt/family education and to maximize pt's level of independence in the home and community environment.     Pt's spiritual, cultural and educational needs considered and pt agreeable to plan of care and goals.     Anticipated barriers to physical therapy: none       GOALS:   Short Term Goals:  4 weeks  1.Report decreased shoulder pain < / =  5/10  to increase tolerance for work - Progressing NOT MET  2. Increase AROM to 90 flexion and 30 ER at 0 abd -Progressing NOT MET  3. Increased strength by 1/3 MMT grade in shoulder strengthe to increase tolerance for ADL and work activities.- Progressing NOT MET  4. Pt to tolerate HEP to improve ROM and independence with ADL's- Progressing NOT MET     Long Term Goals: 12 weeks  1.Report decreased shoulder pain  < / =  2 /10  to increase tolerance for work- -Progressing NOT MET  2.Increase AROM to full pain free-Progressing NOT MET  3.Increase strength to >/= 4/5 in shoulder  strength to increase tolerance for ADL and work activities.-Progressing NOT MET  4. Pt goal: return to gym activities pain free -Progressing NOT MET     Plan   Plan of care Certification: 1/12/2024 to 4/5/2024.    Idalia Talbert, PTA

## 2024-02-08 ENCOUNTER — CLINICAL SUPPORT (OUTPATIENT)
Dept: REHABILITATION | Facility: HOSPITAL | Age: 59
End: 2024-02-08
Payer: MEDICAID

## 2024-02-08 DIAGNOSIS — M25.619 DECREASED RANGE OF MOTION OF SHOULDER, UNSPECIFIED LATERALITY: Primary | ICD-10-CM

## 2024-02-08 PROCEDURE — 97110 THERAPEUTIC EXERCISES: CPT | Mod: PN,CQ

## 2024-02-08 NOTE — PROGRESS NOTES
Physical Therapy Treatment Note     Name: Leobardo Rueda  Clinic Number: [id number]    Therapy Diagnosis:   Encounter Diagnosis   Name Primary?    Decreased range of motion of shoulder, unspecified laterality Yes           Physician: Austen Magaña,*    Visit Date: 2/8/2024    Physician Orders: PT Eval and Treat   Medical Diagnosis from Referral: S42.292D (ICD-10-CM) - Closed 3-part fracture of proximal humerus with routine healing, left   Evaluation Date: 1/12/2024  Authorization Period Expiration: 12/31/2024  Plan of Care Expiration: 4/5/2024  Visit # / Visits authorized: 7/20     Time In: 0815 am (late arrival)  Time Out: 0900 am  Total Appointment Time (timed & untimed codes): 45 minutes     Precautions: Standard    Subjective     Pt reports: If she sits for too long it causes her shoulder to ache. Reports she slept poorly last night due to discomfort and arrived late as a result.      She was compliant with home exercise program.  Response to previous treatment: felt fine  Functional change: ongoing    Pain: 3/10  Location: left shoulder      Objective     AROM: 75 degrees in flexion initial, 100 degrees post session in gravity minimized    Leobardo received therapeutic exercises to develop strength, endurance, ROM, flexibility and posture for 30 minutes including:    Supine resting at 45 abd on towel AAROM ER with T-bar x 30; 5s holds   Supine Hands together AAROM into flexion 3 x 8   Supine short arm flexion to 90 degrees 2 x 10 repetitions   Sidelying flexion with dowel 2 x 10 repetitions   Supine Submax Isometrics ER/IR in scaption 3 x 8; 5s each   Seated Arm prop at 90 abd x 3 minutes  Seated Pulley's in scaption 3 minutes   UBE 3 min Fwd/3min Bwd Level1  for ROM  ER isometrics on wall 3 x 8; 5s holds; pain-free  IR isometrics on wall 3 x 8; 5s holds; pain-free  Finger ladder x 10  Standing Shoulder Walkaway on mat 10x10s holds  Education - HEP / no lifting more than 1lb (coffee cup)     Leobardo  received the following manual therapy techniques: Joint mobilizations were applied to the: (L) shoulder for 10 minutes, including:    Lateral distraction   Manual pendulums   Posterior glide; grade3  Inferior glide; grade 2     Home Exercises Provided and Patient Education Provided     Education provided:   - HEP  - no lifting heavier than a coffee cup  - pain-free submax isometrics ER/IR on wall     Written Home Exercises Provided: yes.  Exercises were reviewed and Leobardo was able to demonstrate them prior to the end of the session.  Leobardo demonstrated good  understanding of the education provided.     See EMR under Patient Instructions for exercises provided 1/19/2024.    Assessment     Despite subjective reports, Leobardo noted with mild improvement in range of motion this am ranging between  degrees of flexion. Was able to incorporate more gravity minimized flexion with decent tolerance. Will continue to progress as able.     Leobardo Is progressing well towards her goals.   Pt prognosis is Good.     Pt will continue to benefit from skilled outpatient physical therapy to address the deficits listed in the problem list box on initial evaluation, provide pt/family education and to maximize pt's level of independence in the home and community environment.     Pt's spiritual, cultural and educational needs considered and pt agreeable to plan of care and goals.     Anticipated barriers to physical therapy: none       GOALS:   Short Term Goals:  4 weeks  1.Report decreased shoulder pain < / =  5/10  to increase tolerance for work - Progressing NOT MET  2. Increase AROM to 90 flexion and 30 ER at 0 abd -Progressing NOT MET  3. Increased strength by 1/3 MMT grade in shoulder strengthe to increase tolerance for ADL and work activities.- Progressing NOT MET  4. Pt to tolerate HEP to improve ROM and independence with ADL's- Progressing NOT MET     Long Term Goals: 12 weeks  1.Report decreased shoulder pain  < / =  2 /10   to increase tolerance for work- -Progressing NOT MET  2.Increase AROM to full pain free-Progressing NOT MET  3.Increase strength to >/= 4/5 in shoulder strength to increase tolerance for ADL and work activities.-Progressing NOT MET  4. Pt goal: return to gym activities pain free -Progressing NOT MET     Plan   Plan of care Certification: 1/12/2024 to 4/5/2024.    Idalia Talbert, PTA

## 2024-02-16 ENCOUNTER — CLINICAL SUPPORT (OUTPATIENT)
Dept: REHABILITATION | Facility: HOSPITAL | Age: 59
End: 2024-02-16
Payer: MEDICAID

## 2024-02-16 DIAGNOSIS — M25.619 DECREASED RANGE OF MOTION OF SHOULDER, UNSPECIFIED LATERALITY: Primary | ICD-10-CM

## 2024-02-16 PROCEDURE — 97110 THERAPEUTIC EXERCISES: CPT | Mod: PN

## 2024-02-16 NOTE — PROGRESS NOTES
Physical Therapy Treatment Note and Progress Note     Name: Leobardo Rueda  Clinic Number: [id number]    Therapy Diagnosis:   Encounter Diagnosis   Name Primary?    Decreased range of motion of shoulder, unspecified laterality Yes     Physician: Austen Magaña,*    Visit Date: 2/16/2024    Physician Orders: PT Eval and Treat   Medical Diagnosis from Referral: S42.292D (ICD-10-CM) - Closed 3-part fracture of proximal humerus with routine healing, left   Evaluation Date: 1/12/2024  Authorization Period Expiration: 12/31/2024  Plan of Care Expiration: 4/5/2024  Visit # / Visits authorized: 8/20     Time In: 800 am  Time Out: 853 am  Total Appointment Time (timed & untimed codes): 53 minutes     Precautions: Standard    Subjective     Pt reports: she feels like she is improving but it is slow.      She was compliant with home exercise program.  Response to previous treatment: felt fine  Functional change: ongoing    Pain: 3/10  Location: left shoulder      Objective     AROM Shoulder    Flexion: 90 degrees   ER at 0 abd: 30 degrees    ER at 45 abd: 35 degrees  MMT:LUE   ER: 4-/5   IR: 4/5     Leobardo received therapeutic exercises to develop strength, endurance, ROM, flexibility and posture for 43 minutes including:    Assessment as above  Supine resting at 45 abd on towel AAROM ER with T-bar x 30; 5s holds   Supine resting at 90 abd on towel AAROM ER with T-bar x 30; 5s holds   Supine Hands together AAROM into flexion 3 x 8   Seated Lat stretch 3 x 30s   Supine short arm flexion to 90 degrees 2 x 10 repetitions   Seated Arm prop at 90 abd x 3 minutes    UBE 3 min Fwd/3min Bwd Level 1 for ROM  ER walkout YTB 3 x 10   IR walkout RTB 3 x 10   Education - HEP / no lifting more than 1lb (coffee cup)     Leobardo received the following manual therapy techniques: Joint mobilizations were applied to the: (L) shoulder for 10 minutes, including:    Lateral distraction   Manual pendulums   Posterior glide; grade3  Inferior  glide; grade 3  Contract relax lat 5 x 8s     Home Exercises Provided and Patient Education Provided     Education provided:   - HEP  - no lifting heavier than a coffee cup  - pain-free submax isometrics ER/IR on wall     Written Home Exercises Provided: yes.  Exercises were reviewed and Leobardo was able to demonstrate them prior to the end of the session.  Leobardo demonstrated good  understanding of the education provided.     See EMR under Patient Instructions for exercises provided 1/19/2024.    Assessment     Re-assessment was performed today on Leobardo who demonstrates improvement in AROM and strength as well as activity tolerance. She is progressing slow as expected with a healing fracture, most irritable into abduction. She remains appropriate for skilled PT to address these deficits and facilitate a return to PLOF. Pt provided updated HEP and therabands. Pt reminded to avoid any lifting more than coffee cup/light dish to shoulder height    Leobardo Is progressing well towards her goals.   Pt prognosis is Good.     Pt will continue to benefit from skilled outpatient physical therapy to address the deficits listed in the problem list box on initial evaluation, provide pt/family education and to maximize pt's level of independence in the home and community environment.     Pt's spiritual, cultural and educational needs considered and pt agreeable to plan of care and goals.     Anticipated barriers to physical therapy: none       GOALS:   Short Term Goals:  4 weeks  1.Report decreased shoulder pain < / =  5/10  to increase tolerance for work - Progressing NOT MET  2. Increase AROM to 90 flexion and 30 ER at 0 abd -Progressing NOT MET  3. Increased strength by 1/3 MMT grade in shoulder strengthe to increase tolerance for ADL and work activities.- Progressing NOT MET  4. Pt to tolerate HEP to improve ROM and independence with ADL's- Progressing NOT MET     Long Term Goals: 12 weeks  1.Report decreased shoulder pain  <  / =  2 /10  to increase tolerance for work- -Progressing NOT MET  2.Increase AROM to full pain free-Progressing NOT MET  3.Increase strength to >/= 4/5 in shoulder strength to increase tolerance for ADL and work activities.-Progressing NOT MET  4. Pt goal: return to gym activities pain free -Progressing NOT MET     Plan   Plan of care Certification: 1/12/2024 to 4/5/2024.    John Clarke, PT

## 2024-02-19 ENCOUNTER — CLINICAL SUPPORT (OUTPATIENT)
Dept: REHABILITATION | Facility: HOSPITAL | Age: 59
End: 2024-02-19
Payer: MEDICAID

## 2024-02-19 DIAGNOSIS — M25.619 DECREASED RANGE OF MOTION OF SHOULDER, UNSPECIFIED LATERALITY: Primary | ICD-10-CM

## 2024-02-19 PROCEDURE — 97110 THERAPEUTIC EXERCISES: CPT | Mod: PN,CQ

## 2024-02-19 NOTE — PROGRESS NOTES
Physical Therapy Treatment Note and Progress Note     Name: Leobardo Rueda  Clinic Number: [id number]    Therapy Diagnosis:   Encounter Diagnosis   Name Primary?    Decreased range of motion of shoulder, unspecified laterality Yes     Physician: Austen Magaña,*    Visit Date: 2/19/2024    Physician Orders: PT Eval and Treat   Medical Diagnosis from Referral: S42.292D (ICD-10-CM) - Closed 3-part fracture of proximal humerus with routine healing, left   Evaluation Date: 1/12/2024  Authorization Period Expiration: 12/31/2024  Plan of Care Expiration: 4/5/2024  Visit # / Visits authorized: 9/20     Time In: 0807 am (late arrival)   Time Out: 0900 am  Total Appointment Time (timed & untimed codes): 53 minutes     Precautions: Standard    Subjective     Pt reports: she feels like her last visit was her best visit yet.      She was compliant with home exercise program.  Response to previous treatment: felt fine  Functional change: ongoing    Pain: 3/10  Location: left shoulder      Objective     AROM Shoulder    Flexion: 90 degrees   ER at 0 abd: 30 degrees    ER at 45 abd: 35 degrees  MMT:LUE   ER: 4-/5   IR: 4/5     Leobardo received therapeutic exercises to develop strength, endurance, ROM, flexibility and posture for 43 minutes including:    Assessment as above  Supine resting at 45 abd on towel AAROM ER with T-bar x 30; 5s holds   Supine resting at 90 abd on towel AAROM ER with T-bar x 30; 5s holds   Supine Hands together AAROM into flexion 3 x 8   Seated Lat stretch 3 x 30s   Supine short arm flexion to 90 degrees 2 x 10 repetitions   Seated Arm prop at 90 abd x 3 minutes    UBE 3 min Fwd/3min Bwd Level 1 for ROM  ER walkout YTB 3 x 10   IR walkout RTB 3 x 10   Education - HEP / no lifting more than 1lb (coffee cup)     Leobardo received the following manual therapy techniques: Joint mobilizations were applied to the: (L) shoulder for 10 minutes, including:    Lateral distraction   Manual pendulums   Posterior  glide; grade3  Inferior glide; grade 3  Contract relax lat 5 x 8s     Home Exercises Provided and Patient Education Provided     Education provided:   - HEP  - no lifting heavier than a coffee cup  - pain-free submax isometrics ER/IR on wall     Written Home Exercises Provided: yes.  Exercises were reviewed and Leobardo was able to demonstrate them prior to the end of the session.  Leobardo demonstrated good  understanding of the education provided.     See EMR under Patient Instructions for exercises provided 1/19/2024.    Assessment     Leobardo noted with good tolerance to treatment. Range of motion continues to be her greatest limitation. Continued focus on improving RTC strength within available range. Will progress as able.     Leobardo Is progressing well towards her goals.   Pt prognosis is Good.     Pt will continue to benefit from skilled outpatient physical therapy to address the deficits listed in the problem list box on initial evaluation, provide pt/family education and to maximize pt's level of independence in the home and community environment.     Pt's spiritual, cultural and educational needs considered and pt agreeable to plan of care and goals.     Anticipated barriers to physical therapy: none       GOALS:   Short Term Goals:  4 weeks  1.Report decreased shoulder pain < / =  5/10  to increase tolerance for work - Progressing NOT MET  2. Increase AROM to 90 flexion and 30 ER at 0 abd -Progressing NOT MET  3. Increased strength by 1/3 MMT grade in shoulder strengthe to increase tolerance for ADL and work activities.- Progressing NOT MET  4. Pt to tolerate HEP to improve ROM and independence with ADL's- Progressing NOT MET     Long Term Goals: 12 weeks  1.Report decreased shoulder pain  < / =  2 /10  to increase tolerance for work- -Progressing NOT MET  2.Increase AROM to full pain free-Progressing NOT MET  3.Increase strength to >/= 4/5 in shoulder strength to increase tolerance for ADL and work  activities.-Progressing NOT MET  4. Pt goal: return to gym activities pain free -Progressing NOT MET     Plan   Plan of care Certification: 1/12/2024 to 4/5/2024.    Idalia Talbert, PTA

## 2024-02-21 ENCOUNTER — CLINICAL SUPPORT (OUTPATIENT)
Dept: REHABILITATION | Facility: HOSPITAL | Age: 59
End: 2024-02-21
Payer: MEDICAID

## 2024-02-21 DIAGNOSIS — M25.619 DECREASED RANGE OF MOTION OF SHOULDER, UNSPECIFIED LATERALITY: Primary | ICD-10-CM

## 2024-02-21 PROCEDURE — 97110 THERAPEUTIC EXERCISES: CPT | Mod: PN

## 2024-02-21 NOTE — PROGRESS NOTES
Physical Therapy Treatment Note      Name: Leobardo Rueda  Clinic Number: [id number]    Therapy Diagnosis:   Encounter Diagnosis   Name Primary?    Decreased range of motion of shoulder, unspecified laterality Yes       Physician: Austen Magaña,*    Visit Date: 2/21/2024    Physician Orders: PT Eval and Treat   Medical Diagnosis from Referral: S42.292D (ICD-10-CM) - Closed 3-part fracture of proximal humerus with routine healing, left   Evaluation Date: 1/12/2024  Authorization Period Expiration: 12/31/2024  Plan of Care Expiration: 4/5/2024  Visit # / Visits authorized: 10/20     Time In: 815 am (patient 15 min late)   Time Out: 908 am  Total Appointment Time (timed & untimed codes): 53 minutes     Precautions: Standard    Subjective     Pt reports: she was sore the day after last session but feels fine now.      She was compliant with home exercise program.  Response to previous treatment: felt fine  Functional change: ongoing    Pain: 3/10  Location: left shoulder      Objective     AROM Shoulder:   Flexion: 90 degrees   ER at 0 abd: 30 degrees    ER at 45 abd: 35 degrees  MMT: LUE   ER: 4-/5   IR: 4/5     PT Tech assisted with session    Leobardo received therapeutic exercises to develop strength, endurance, ROM, flexibility and posture for 48 minutes including:    Standing ER wall stretch at 0 abd x 20; 5s holds   Supine resting at 45 abd on towel AAROM ER with T-bar x 30; 5s holds   Supine resting at 90 abd on towel AAROM ER with T-bar x 30; 5s holds   Supine Serratus Punch 3 x 8   Standing Lat stretch 2 x 10; 10s holds   Finger Ladder; x 10; 5s holds    UBE 3 min Fwd/3min Bwd Level 1 for ROM  ER walkout YTB 3 x 10   IR walkout RTB 3 x 10   No Money 3 x 10 YTB   Education - HEP / no lifting more than 1lb (coffee cup)     Leobardo received the following manual therapy techniques: Joint mobilizations were applied to the: (L) shoulder for 5 minutes, including:    Lateral distraction   Manual pendulums    Posterior glide; grade3  Inferior glide; grade 3  Contract relax lat 5 x 8s     Home Exercises Provided and Patient Education Provided     Education provided:   - HEP  - no lifting heavier than a coffee cup  - pain-free submax isometrics ER/IR on wall     Written Home Exercises Provided: yes.  Exercises were reviewed and Leobardo was able to demonstrate them prior to the end of the session.  Leobardo demonstrated good  understanding of the education provided.     See EMR under Patient Instructions for exercises provided 1/19/2024.    Assessment     Leobardo noted with good tolerance to treatment. She arrived late but I was able to accommodate a full session. Continuing to gradually load. Recommend continuing to light stretch into ER to promote mobility and to continue isometrics/light resistance(pain-free) to improves strength.     Leobardo Is progressing well towards her goals.   Pt prognosis is Good.     Pt will continue to benefit from skilled outpatient physical therapy to address the deficits listed in the problem list box on initial evaluation, provide pt/family education and to maximize pt's level of independence in the home and community environment.     Pt's spiritual, cultural and educational needs considered and pt agreeable to plan of care and goals.     Anticipated barriers to physical therapy: none       GOALS:   Short Term Goals:  4 weeks  1.Report decreased shoulder pain < / =  5/10  to increase tolerance for work - Progressing NOT MET  2. Increase AROM to 90 flexion and 30 ER at 0 abd -Progressing NOT MET  3. Increased strength by 1/3 MMT grade in shoulder strengthe to increase tolerance for ADL and work activities.- Progressing NOT MET  4. Pt to tolerate HEP to improve ROM and independence with ADL's- Progressing NOT MET     Long Term Goals: 12 weeks  1.Report decreased shoulder pain  < / =  2 /10  to increase tolerance for work- -Progressing NOT MET  2.Increase AROM to full pain free-Progressing NOT  MET  3.Increase strength to >/= 4/5 in shoulder strength to increase tolerance for ADL and work activities.-Progressing NOT MET  4. Pt goal: return to gym activities pain free -Progressing NOT MET     Plan   Plan of care Certification: 1/12/2024 to 4/5/2024.    John Clarke, PT

## 2024-02-22 ENCOUNTER — LAB VISIT (OUTPATIENT)
Dept: LAB | Facility: OTHER | Age: 59
End: 2024-02-22
Attending: OBSTETRICS & GYNECOLOGY
Payer: MEDICAID

## 2024-02-22 ENCOUNTER — OFFICE VISIT (OUTPATIENT)
Dept: OBSTETRICS AND GYNECOLOGY | Facility: CLINIC | Age: 59
End: 2024-02-22
Payer: MEDICAID

## 2024-02-22 VITALS — HEIGHT: 62 IN | BODY MASS INDEX: 24.69 KG/M2

## 2024-02-22 DIAGNOSIS — F41.9 ANXIETY: ICD-10-CM

## 2024-02-22 DIAGNOSIS — Z01.419 ENCOUNTER FOR GYNECOLOGICAL EXAMINATION: Primary | ICD-10-CM

## 2024-02-22 DIAGNOSIS — N95.1 SYMPTOMATIC MENOPAUSAL OR FEMALE CLIMACTERIC STATES: ICD-10-CM

## 2024-02-22 DIAGNOSIS — Z12.31 ENCOUNTER FOR SCREENING MAMMOGRAM FOR MALIGNANT NEOPLASM OF BREAST: ICD-10-CM

## 2024-02-22 PROCEDURE — 99999 PR PBB SHADOW E&M-EST. PATIENT-LVL III: CPT | Mod: PBBFAC,,, | Performed by: OBSTETRICS & GYNECOLOGY

## 2024-02-22 PROCEDURE — 36415 COLL VENOUS BLD VENIPUNCTURE: CPT | Performed by: OBSTETRICS & GYNECOLOGY

## 2024-02-22 PROCEDURE — 3008F BODY MASS INDEX DOCD: CPT | Mod: CPTII,,, | Performed by: OBSTETRICS & GYNECOLOGY

## 2024-02-22 PROCEDURE — 1159F MED LIST DOCD IN RCRD: CPT | Mod: CPTII,,, | Performed by: OBSTETRICS & GYNECOLOGY

## 2024-02-22 PROCEDURE — 99213 OFFICE O/P EST LOW 20 MIN: CPT | Mod: PBBFAC | Performed by: OBSTETRICS & GYNECOLOGY

## 2024-02-22 PROCEDURE — 99396 PREV VISIT EST AGE 40-64: CPT | Mod: S$PBB,,, | Performed by: OBSTETRICS & GYNECOLOGY

## 2024-02-22 PROCEDURE — 84402 ASSAY OF FREE TESTOSTERONE: CPT | Performed by: OBSTETRICS & GYNECOLOGY

## 2024-02-22 RX ORDER — ALPRAZOLAM 0.5 MG/1
0.5 TABLET ORAL NIGHTLY PRN
Qty: 30 TABLET | Refills: 1 | Status: SHIPPED | OUTPATIENT
Start: 2024-02-22

## 2024-02-22 NOTE — PROGRESS NOTES
HPI: Pt is a 59 y.o.  female who presents for routine annual exam. She is  and on HRT with Testosterone only. We started this in 5/2023. Currently on 76 mg monthly and doing well. Only concern was mild hair loss and increased size and sensation of her clitoris ONLY on the day she receives her injection. Feeling and size resolves after 1-2 days. Started Nutrafol vitamins 2 days ago to help with hair.     Pap/HPV 8/10/2021 NORMAL  MMG 5/26/2022 NORMAL; was scheduled for repeat in December 2023 but she broke her shoulder and has been splinted since then so she cancelled it. She will reschedule once she is done with PT and can lift her arm above her head.   DXA 5/19/23 Osteopenia    Labs 12/8/23:  E2 12.7 (she is not on estrogen)  Free T = 2.2    Last injection was on 1/19/24 and she is currently due (5 weeks out from last one).       ROS:  GENERAL: Feeling well overall. Denies fever or chills.   SKIN: Denies rash or lesions.   HEAD: Denies head injury or headache.   NODES: Denies enlarged lymph nodes.   CHEST: Denies chest pain or shortness of breath.   CARDIOVASCULAR: Denies palpitations or left sided chest pain.   ABDOMEN: No abdominal pain, constipation, diarrhea, nausea or vomiting   URINARY: No dysuria, hematuria, or burning on urination.  REPRODUCTIVE: See HPI.   BREASTS: Denies pain, lumps, or nipple discharge.   HEMATOLOGIC: No easy bruisability or excessive bleeding.   MUSCULOSKELETAL: Denies joint pain or swelling.   NEUROLOGIC: Denies syncope or weakness.   PSYCHIATRIC: Denies acute depression or anxiety     PE:   APPEARANCE: Well nourished, well developed, White female in no acute distress.  NODES: no inguinal lymphadenopathy  BREASTS: Symmetrical, no skin changes or visible lesions. No palpable masses, nipple discharge or adenopathy bilaterally.  ABDOMEN: Soft. No tenderness or masses. No distention.   VULVA: No lesions. Normal external female genitalia. No clitoromegaly.   URETHRAL MEATUS: Normal size  and location, no lesions, no prolapse.  URETHRA: No masses, tenderness, or prolapse.  VAGINA: Moist. No lesions or lacerations noted. No abnormal discharge present. No odor present.   CERVIX: No lesions or discharge. No cervical motion tenderness.   UTERUS: Normal size, regular shape, mobile, non-tender.  ADNEXA: No tenderness. No fullness or masses palpated in the adnexal regions.   ANUS PERINEUM: Normal.      Diagnosis:  1. Encounter for gynecological examination    2. Encounter for screening mammogram for malignant neoplasm of breast    3. Symptomatic menopausal or female climacteric states    4. Anxiety        Plan:     Orders Placed This Encounter    Mammo Digital Screening Bilat w/ [facility]    Testosterone, Free    ALPRAZolam (XANAX) 0.5 MG tablet     - Pap/HPV up to date  - MMG reordered  - Free Testosterone level today given her symptom of hair loss. If level <1.0 will stick with 76 mg dose and see if Nutrafol helps with mild hair loss. If level > 1.0 will decrease to 50 mg since she is 5 weeks out from last injection.     Patient was counseled today on the new ACS guidelines for cervical cytology screening as well as the current recommendations for breast cancer screening. She was counseled to follow up with her PCP for other routine health maintenance.     Follow-up with me in 1 year for routine exam

## 2024-02-26 ENCOUNTER — CLINICAL SUPPORT (OUTPATIENT)
Dept: REHABILITATION | Facility: HOSPITAL | Age: 59
End: 2024-02-26
Payer: MEDICAID

## 2024-02-26 DIAGNOSIS — M25.619 DECREASED RANGE OF MOTION OF SHOULDER, UNSPECIFIED LATERALITY: Primary | ICD-10-CM

## 2024-02-26 LAB — TESTOST FREE SERPL-MCNC: 0.6 PG/ML

## 2024-02-26 PROCEDURE — 97110 THERAPEUTIC EXERCISES: CPT | Mod: PN,CQ

## 2024-02-26 NOTE — PROGRESS NOTES
"  Physical Therapy Treatment Note      Name: Leobardo Rueda  Clinic Number: [id number]    Therapy Diagnosis:   Encounter Diagnosis   Name Primary?    Decreased range of motion of shoulder, unspecified laterality Yes       Physician: Austen Magaña,*    Visit Date: 2/26/2024    Physician Orders: PT Eval and Treat   Medical Diagnosis from Referral: S42.292D (ICD-10-CM) - Closed 3-part fracture of proximal humerus with routine healing, left   Evaluation Date: 1/12/2024  Authorization Period Expiration: 12/31/2024  Plan of Care Expiration: 4/5/2024  Visit # / Visits authorized: 11/20     Time In: 812 am (patient 12 min late)   Time Out: 900 am  Total Appointment Time (timed & untimed codes): 45 minutes  Physical Therapy technician assisted with treatment under direct supervision of treating therapist.      Precautions: Standard    Subjective     Pt reports: "I did something stupid on Friday. I tried clasping my bra behind my back." Reports being in pain Friday through the weekend as a result.      She was compliant with home exercise program.  Response to previous treatment: felt fine  Functional change: ongoing    Pain: 3/10  Location: left shoulder      Objective     AROM Shoulder:   Flexion: 90 degrees   ER at 0 abd: 30 degrees    ER at 45 abd: 35 degrees  MMT: LUE   ER: 4-/5   IR: 4/5     PT Tech assisted with session    Leobardo received therapeutic exercises to develop strength, endurance, ROM, flexibility and posture for 48 minutes including:    Standing ER wall stretch at 0 abd x 20; 5s holds   Supine resting at 45 abd on towel AAROM ER with T-bar x 30; 5s holds   Supine resting at 90 abd on towel AAROM ER with T-bar x 30; 5s holds   Supine Serratus Punch 3 x 8   Standing Lat stretch 2 x 10; 10s holds   Finger Ladder; x 10; 5s holds    UBE 3 min Fwd/3min Bwd Level 1 for ROM  ER walkout YTB 3 x 10   IR walkout RTB 3 x 10   No Money 3 x 10 YTB   Education - HEP / no lifting more than 1lb (coffee cup)   "   Leobardo received the following manual therapy techniques: Joint mobilizations were applied to the: (L) shoulder for 5 minutes, including:    Lateral distraction   Manual pendulums   Posterior glide; grade3  Inferior glide; grade 3  Contract relax lat 5 x 8s     Home Exercises Provided and Patient Education Provided     Education provided:   - HEP  - no lifting heavier than a coffee cup  - pain-free submax isometrics ER/IR on wall     Written Home Exercises Provided: yes.  Exercises were reviewed and Leobardo was able to demonstrate them prior to the end of the session.  Leobardo demonstrated good  understanding of the education provided.     See EMR under Patient Instructions for exercises provided 1/19/2024.    Assessment     Leobardo noted with good tolerance to treatment. Due to subjective reporting treatment was not progressed. She struggled with range of motion this date but improve as range of motion progressed. Will continue to benefit from skilled Physical Therapy to maximize range of motion and strength as fracture continues to heal.     Leobardo Is progressing well towards her goals.   Pt prognosis is Good.     Pt will continue to benefit from skilled outpatient physical therapy to address the deficits listed in the problem list box on initial evaluation, provide pt/family education and to maximize pt's level of independence in the home and community environment.     Pt's spiritual, cultural and educational needs considered and pt agreeable to plan of care and goals.     Anticipated barriers to physical therapy: none       GOALS:   Short Term Goals:  4 weeks  1.Report decreased shoulder pain < / =  5/10  to increase tolerance for work - Progressing NOT MET  2. Increase AROM to 90 flexion and 30 ER at 0 abd -Progressing NOT MET  3. Increased strength by 1/3 MMT grade in shoulder strengthe to increase tolerance for ADL and work activities.- Progressing NOT MET  4. Pt to tolerate HEP to improve ROM and independence  with ADL's- Progressing NOT MET     Long Term Goals: 12 weeks  1.Report decreased shoulder pain  < / =  2 /10  to increase tolerance for work- -Progressing NOT MET  2.Increase AROM to full pain free-Progressing NOT MET  3.Increase strength to >/= 4/5 in shoulder strength to increase tolerance for ADL and work activities.-Progressing NOT MET  4. Pt goal: return to gym activities pain free -Progressing NOT MET     Plan   Plan of care Certification: 1/12/2024 to 4/5/2024.    Idalia Talbert, PTA

## 2024-02-27 ENCOUNTER — PATIENT MESSAGE (OUTPATIENT)
Dept: OBSTETRICS AND GYNECOLOGY | Facility: CLINIC | Age: 59
End: 2024-02-27
Payer: MEDICAID

## 2024-02-28 ENCOUNTER — CLINICAL SUPPORT (OUTPATIENT)
Dept: REHABILITATION | Facility: HOSPITAL | Age: 59
End: 2024-02-28
Payer: MEDICAID

## 2024-02-28 DIAGNOSIS — M25.619 DECREASED RANGE OF MOTION OF SHOULDER, UNSPECIFIED LATERALITY: Primary | ICD-10-CM

## 2024-02-28 PROCEDURE — 97110 THERAPEUTIC EXERCISES: CPT | Mod: PN

## 2024-02-28 NOTE — PROGRESS NOTES
Physical Therapy Treatment Note      Name: Leobardo Rueda  Clinic Number: [id number]    Therapy Diagnosis:   Encounter Diagnosis   Name Primary?    Decreased range of motion of shoulder, unspecified laterality Yes     Physician: Austen Magaña,*    Visit Date: 2/28/2024    Physician Orders: PT Eval and Treat   Medical Diagnosis from Referral: S42.292D (ICD-10-CM) - Closed 3-part fracture of proximal humerus with routine healing, left   Evaluation Date: 1/12/2024  Authorization Period Expiration: 12/31/2024  Plan of Care Expiration: 4/5/2024  Visit # / Visits authorized: 11/20     Time In: 807 am  Time Out: 900 am  Total Appointment Time (timed & untimed codes): 53 minutes       Precautions: Standard    Subjective     Pt reports: she felt okay after last session. States shoulder feels stiff  She was compliant with home exercise program.  Response to previous treatment: felt fine  Functional change: ongoing    Pain: 3/10  Location: left shoulder      Objective     10+ weeks since initial injury    AROM Shoulder:   Flexion: 95 degrees (passive: 108 degrees)   ER at 0 abd: 30 degrees    ER at 45 abd: 35 degrees; empty  MMT: LUE   ER: 4-/5   IR: 4/5   Muscle Length: Lat tightness       Leobardo received therapeutic exercises to develop strength, endurance, ROM, flexibility and posture for 38 minutes including:    Supine AAROM with hands clasped with LTR to R for lat stretch x 20; 5s holds - added to HEP  Standing ER wall stretch at 0 abd x 20; 5s holds   Supine resting at 45 abd on towel ER to IR neutral x 20; 5s holds    Submax Ismetrics with joint centered ER-IR x 10; 3s each following manual  Seated AAROM with cable pull down GTB x 20   Standing Stair railing assisted serratus landmines x 20; 5s holds    ER walkout YTB 3 x 10; cueing for neutral wrist  No Money 3 x 10 YTB; 3s holds  Education - HEP / no lifting more than 1lb (coffee cup) / added LTR+AAROM flexion to HEP      Leobardo received the following manual  therapy techniques: Joint mobilizations were applied to the: (L) shoulder for 15 minutes, including:    Lateral distraction   Manual pendulums   Posterior glide; grade3  Inferior glide; grade 3  Contract relax lat 5 x 8s     Home Exercises Provided and Patient Education Provided     Education provided:   - HEP  - no lifting heavier than a coffee cup    Written Home Exercises Provided: yes.  Exercises were reviewed and Leobardo was able to demonstrate them prior to the end of the session.  Leobardo demonstrated good  understanding of the education provided.     See EMR under Patient Instructions for exercises provided 1/19/2024.    Assessment     Leobardo continued to be limited with significant tightness into ER and elevation. RTC also remains weak, epecially ER. She demonstrate shrug sign due to lack of humeral depression with elevation. She would continue to benefit from MT+TE to improve strength/ROM of her shoulder. Passively, she was able to obtain 108 degrees with an empty end-feel. Updated HEP provided with good patient understanding. Encouraged her to increase frequency of supine routine to promote more stretching of the anterior/inferior capsule. 3/4 STG's met.     Leobardo Is progressing well towards her goals.   Pt prognosis is Good.     Pt will continue to benefit from skilled outpatient physical therapy to address the deficits listed in the problem list box on initial evaluation, provide pt/family education and to maximize pt's level of independence in the home and community environment.     Pt's spiritual, cultural and educational needs considered and pt agreeable to plan of care and goals.     Anticipated barriers to physical therapy: none       GOALS:   Short Term Goals:  4 weeks  1.Report decreased shoulder pain < / =  5/10  to increase tolerance for work - MET  2. Increase AROM to 90 flexion and 30 ER at 0 abd - MET  3. Increased strength by 1/3 MMT grade in shoulder strengthe to increase tolerance for ADL  and work activities.- Progressing NOT MET  4. Pt to tolerate HEP to improve ROM and independence with ADL's-  MET     Long Term Goals: 12 weeks  1.Report decreased shoulder pain  < / =  2 /10  to increase tolerance for work- -Progressing NOT MET  2.Increase AROM to full pain free-Progressing NOT MET  3.Increase strength to >/= 4/5 in shoulder strength to increase tolerance for ADL and work activities.-Progressing NOT MET  4. Pt goal: return to gym activities pain free -Progressing NOT MET     Plan   Plan of care Certification: 1/12/2024 to 4/5/2024.    John Clarke, PT

## 2024-02-29 ENCOUNTER — CLINICAL SUPPORT (OUTPATIENT)
Dept: OBSTETRICS AND GYNECOLOGY | Facility: CLINIC | Age: 59
End: 2024-02-29
Payer: MEDICAID

## 2024-02-29 DIAGNOSIS — N95.1 SYMPTOMATIC MENOPAUSAL OR FEMALE CLIMACTERIC STATES: Primary | ICD-10-CM

## 2024-02-29 PROCEDURE — 99999PBSHW PR PBB SHADOW TECHNICAL ONLY FILED TO HB: Mod: PBBFAC,,,

## 2024-02-29 PROCEDURE — 96372 THER/PROPH/DIAG INJ SC/IM: CPT | Mod: PBBFAC

## 2024-02-29 RX ADMIN — TESTOSTERONE CYPIONATE 76 MG: 200 INJECTION, SOLUTION INTRAMUSCULAR at 03:02

## 2024-03-04 ENCOUNTER — CLINICAL SUPPORT (OUTPATIENT)
Dept: REHABILITATION | Facility: HOSPITAL | Age: 59
End: 2024-03-04
Payer: MEDICAID

## 2024-03-04 DIAGNOSIS — S42.292D CLOSED 3-PART FRACTURE OF PROXIMAL HUMERUS WITH ROUTINE HEALING, LEFT: ICD-10-CM

## 2024-03-04 DIAGNOSIS — M25.619 DECREASED RANGE OF MOTION OF SHOULDER, UNSPECIFIED LATERALITY: Primary | ICD-10-CM

## 2024-03-04 PROCEDURE — 97110 THERAPEUTIC EXERCISES: CPT | Mod: PN,CQ

## 2024-03-04 NOTE — PROGRESS NOTES
Physical Therapy Treatment Note      Name: Leobardo Rueda  Clinic Number: [id number]    Therapy Diagnosis:   Encounter Diagnoses   Name Primary?    Decreased range of motion of shoulder, unspecified laterality Yes    Closed 3-part fracture of proximal humerus with routine healing, left        Physician: Austen Magaña,*    Visit Date: 3/4/2024    Physician Orders: PT Eval and Treat   Medical Diagnosis from Referral: S42.292D (ICD-10-CM) - Closed 3-part fracture of proximal humerus with routine healing, left   Evaluation Date: 1/12/2024  Authorization Period Expiration: 12/31/2024  Plan of Care Expiration: 4/5/2024  Visit # / Visits authorized: 12/20     Time In: 813 am (patient late)  Time Out: 853 am  Total Appointment Time (timed & untimed codes): 45 minutes       Precautions: Standard    Subjective     Pt reports: shoulder is still tight, but doing alright.   She was compliant with home exercise program.  Response to previous treatment: no issues   Functional change: ongoing    Pain: 3/10  Location: left shoulder      Objective     10+ weeks since initial injury    AROM Shoulder:   Flexion: 95 degrees (passive: 108 degrees)   ER at 0 abd: 30 degrees    ER at 45 abd: 35 degrees; empty  MMT: LUE   ER: 4-/5   IR: 4/5   Muscle Length: Lat tightness       Leobardo received therapeutic exercises to develop strength, endurance, ROM, flexibility and posture for 30 minutes including:    Supine AAROM with hands clasped with LTR to R for lat stretch x 20; 5s holds - added to HEP  Standing ER wall stretch at 0 abd x 20; 5s holds   Supine resting at 45 abd on towel ER to IR neutral x 20; 5s holds    Submax Ismetrics with joint centered ER-IR x 10; 3s each following manual  Seated AAROM with cable pull down GTB x 20   Standing Stair railing assisted serratus landmines x 20; 5s holds    ER walkout YTB 3 x 10; cueing for neutral wrist  No Money 3 x 10 YTB; 3s holds  Education - HEP / no lifting more than 1lb (coffee cup) /  added LTR+AAROM flexion to HEP      Leobardo received the following manual therapy techniques: Joint mobilizations were applied to the: (L) shoulder for 15 minutes, including:    Lateral distraction   Manual pendulums   Posterior glide; grade3  Inferior glide; grade 3  Contract relax lat 5 x 8s     Home Exercises Provided and Patient Education Provided     Education provided:   - HEP  - no lifting heavier than a coffee cup    Written Home Exercises Provided: yes.  Exercises were reviewed and Leobardo was able to demonstrate them prior to the end of the session.  Leobardo demonstrated good  understanding of the education provided.     See EMR under Patient Instructions for exercises provided 1/19/2024.    Assessment     Leobardo continues to have increased tightness and tenderness with left shoulder range of motion. Favorable response to manual. Compensations still noted with left shoulder elevation.     Leobardo Is progressing well towards her goals.   Pt prognosis is Good.     Pt will continue to benefit from skilled outpatient physical therapy to address the deficits listed in the problem list box on initial evaluation, provide pt/family education and to maximize pt's level of independence in the home and community environment.     Pt's spiritual, cultural and educational needs considered and pt agreeable to plan of care and goals.     Anticipated barriers to physical therapy: none       GOALS:   Short Term Goals:  4 weeks  1.Report decreased shoulder pain < / =  5/10  to increase tolerance for work - MET  2. Increase AROM to 90 flexion and 30 ER at 0 abd - MET  3. Increased strength by 1/3 MMT grade in shoulder strengthe to increase tolerance for ADL and work activities.- Progressing NOT MET  4. Pt to tolerate HEP to improve ROM and independence with ADL's-  MET     Long Term Goals: 12 weeks  1.Report decreased shoulder pain  < / =  2 /10  to increase tolerance for work- -Progressing NOT MET  2.Increase AROM to full pain  free-Progressing NOT MET  3.Increase strength to >/= 4/5 in shoulder strength to increase tolerance for ADL and work activities.-Progressing NOT MET  4. Pt goal: return to gym activities pain free -Progressing NOT MET     Plan   Plan of care Certification: 1/12/2024 to 4/5/2024.    [place]any [place], PTA

## 2024-03-06 ENCOUNTER — CLINICAL SUPPORT (OUTPATIENT)
Dept: REHABILITATION | Facility: HOSPITAL | Age: 59
End: 2024-03-06
Payer: MEDICAID

## 2024-03-06 DIAGNOSIS — M25.619 DECREASED RANGE OF MOTION OF SHOULDER, UNSPECIFIED LATERALITY: Primary | ICD-10-CM

## 2024-03-06 PROCEDURE — 97110 THERAPEUTIC EXERCISES: CPT | Mod: PN

## 2024-03-06 NOTE — PROGRESS NOTES
Physical Therapy Treatment Note      Name: Leobardo Rueda  Clinic Number: [id number]    Therapy Diagnosis:   Encounter Diagnosis   Name Primary?    Decreased range of motion of shoulder, unspecified laterality Yes     Physician: Austen Magaña,*    Visit Date: 3/6/2024    Physician Orders: PT Eval and Treat   Medical Diagnosis from Referral: S42.292D (ICD-10-CM) - Closed 3-part fracture of proximal humerus with routine healing, left   Evaluation Date: 1/12/2024  Authorization Period Expiration: 12/31/2024  Plan of Care Expiration: 4/5/2024  Visit # / Visits authorized: 13/20     Time In: 820 am (patient 16 minutes late)  Time Out: 900 am  Total Appointment Time (timed & untimed codes): 40 minutes    Precautions: Standard    Subjective     Pt reports: shoulder is still tight. She states she is doing her exercises daily. States she is late because she had to drive here.   She was compliant with home exercise program.  Response to previous treatment: no issues   Functional change: ongoing    Pain: 3/10  Location: left shoulder      Objective     10+ weeks since initial injury    AROM Shoulder:   Flexion: 105 degrees (passive: 125 degrees)   ER at 0 abd: 30 degrees    ER at 45 abd: 35 degrees; empty  MMT: LUE   ER: 4-/5   IR: 4/5   Muscle Length: Lat tightness     Leobardo received therapeutic exercises to develop strength, endurance, ROM, flexibility and posture for 28 minutes including:    Supine AAROM with hands clasped with LTR to R for lat stretch x 20; 5s holds   Seated Arm prop at 90 abd 2# for inferior glide x 4 minutes   Supine resting at 80 abd on towel ER with dowel x 20; 5s holds    Submax Ismetrics with joint centered in scaption ER-IR 3 x 10; 3s each following manual  Standing Stair railing assisted serratus landmines x 20; 5s holds    ER walkout YTB 3 x 10; cueing for neutral wrist  IR walkout YTB 3 x 10; cueing for neutral wrist  ER isometric into UE flexion 3 x 10 YTB  Education - HEP / no lifting  more than 1lb (coffee cup)     Leobardo received the following manual therapy techniques: Joint mobilizations were applied to the: (L) shoulder for 12 minutes, including:    Lateral distraction   Manual pendulums   Posterior glide; grade3  Inferior glide; grade 3  Contract relax lat 5 x 8s     Home Exercises Provided and Patient Education Provided     Education provided:   - HEP  - no lifting heavier than a coffee cup    Written Home Exercises Provided: yes.  Exercises were reviewed and Leobardo was able to demonstrate them prior to the end of the session.  Leobardo demonstrated good  understanding of the education provided.     See EMR under Patient Instructions for exercises provided 1/19/2024.    Assessment     Leobardo continues to have increased tightness and tenderness with left shoulder range of motion. Improved AROM by 10 degrees today which is encouraging. Informed her to continue lat stretch at home as well as IR/ER walkouts.     Leobardo Is progressing well towards her goals.   Pt prognosis is Good.     Pt will continue to benefit from skilled outpatient physical therapy to address the deficits listed in the problem list box on initial evaluation, provide pt/family education and to maximize pt's level of independence in the home and community environment.     Pt's spiritual, cultural and educational needs considered and pt agreeable to plan of care and goals.     Anticipated barriers to physical therapy: none       GOALS:   Short Term Goals:  4 weeks  1.Report decreased shoulder pain < / =  5/10  to increase tolerance for work - MET  2. Increase AROM to 90 flexion and 30 ER at 0 abd - MET  3. Increased strength by 1/3 MMT grade in shoulder strengthe to increase tolerance for ADL and work activities.- Progressing NOT MET  4. Pt to tolerate HEP to improve ROM and independence with ADL's-  MET     Long Term Goals: 12 weeks  1.Report decreased shoulder pain  < / =  2 /10  to increase tolerance for work- -Progressing  NOT MET  2.Increase AROM to full pain free-Progressing NOT MET  3.Increase strength to >/= 4/5 in shoulder strength to increase tolerance for ADL and work activities.-Progressing NOT MET  4. Pt goal: return to gym activities pain free -Progressing NOT MET     Plan   Plan of care Certification: 1/12/2024 to 4/5/2024.    John Clarke, PT

## 2024-03-11 DIAGNOSIS — M25.512 LEFT SHOULDER PAIN, UNSPECIFIED CHRONICITY: Primary | ICD-10-CM

## 2024-03-13 ENCOUNTER — CLINICAL SUPPORT (OUTPATIENT)
Dept: REHABILITATION | Facility: HOSPITAL | Age: 59
End: 2024-03-13
Payer: MEDICAID

## 2024-03-13 DIAGNOSIS — M25.619 DECREASED RANGE OF MOTION OF SHOULDER, UNSPECIFIED LATERALITY: Primary | ICD-10-CM

## 2024-03-13 PROCEDURE — 97110 THERAPEUTIC EXERCISES: CPT | Mod: PN

## 2024-03-13 NOTE — PROGRESS NOTES
Physical Therapy Treatment Note and Progress Note     Name: Leobardo Rueda  Clinic Number: [id number]    Therapy Diagnosis:   Encounter Diagnosis   Name Primary?    Decreased range of motion of shoulder, unspecified laterality Yes       Physician: Austen Magaña,*    Visit Date: 3/13/2024    Physician Orders: PT Eval and Treat   Medical Diagnosis from Referral: S42.292D (ICD-10-CM) - Closed 3-part fracture of proximal humerus with routine healing, left   Evaluation Date: 1/12/2024  Authorization Period Expiration: 12/31/2024  Plan of Care Expiration: 5/10/2024  Visit # / Visits authorized: 14/20     Time In: 807 am (pt 7 min late)  Time Out: 900 am  Total Appointment Time (timed & untimed codes): 53 minutes    Precautions: Standard    Subjective     Pt reports: shoulder is still tight. She states pain is less at this point.     She was compliant with home exercise program.  Response to previous treatment: no issues   Functional change: ongoing    Pain: 1/10  Location: left shoulder      Objective     PT tech assited with session    AROM Shoulder:   Flexion: 108 degrees (passive: 127 degrees)   ER at 0 abd: 40 degrees    ER at 45 abd: 40 degrees; firm   ER at 90 abd: 5 degrees; firm   MMT: LUE   ER: 4-/5   IR: 4/5   Muscle Length: mild tightness of lat    Leobardo received therapeutic exercises to develop strength, endurance, ROM, flexibility and posture for 33 minutes including:    Assessment above  UBE 3 min Fwd/3min Bwd Level 2   Supine AAROM with hands clasped  x 20; 5s holds   Seated Arm prop at 90 abd 3# for inferior glide x 4 minutes   SL ER 3 x 8   Supine resting at 80 abd on towel ER with dowel x 20; 10s holds    Submax Ismetrics with joint centered in scaption ER-IR 3 x 10; 3s each following manual  Standing Stair railing assisted serratus landmines x 20; 5s holds    ER walkout YTB 3 x 10; cueing for neutral wrist  IR walkout YTB 3 x 10; cueing for neutral wrist  Walking with BUE flexion; hands  together 4 x 10 yards  Education - HEP / no lifting more than 1lb (coffee cup)     Leobardo received the following manual therapy techniques: Joint mobilizations were applied to the: (L) shoulder for 10 minutes, including:    Lateral distraction   Manual pendulums   Posterior glide; grade3  Inferior glide; grade 3  Contract relax lat 5 x 8s     Home Exercises Provided and Patient Education Provided     Education provided:   - HEP  - no lifting heavier than a coffee cup    Written Home Exercises Provided: yes.  Exercises were reviewed and Leobardo was able to demonstrate them prior to the end of the session.  Leobardo demonstrated good  understanding of the education provided.     See EMR under Patient Instructions for exercises provided 1/19/2024.    Assessment     Leobardo continues to lack significant ROM into ER (firm end-feel) and elevation (empty). She also lacks strength, notably into ER. I believe she is appropriate for an extension of PT at 2x/week for 8 more weeks for further development of these impairments to improve her function and reach her goals. Pt is agreeable to this POC decision. I encourage the patient to continue HEP 2-3x/day and to continue to arrive to PT on time so we have the full session to work with. Pt verbalized understanding.     Leobardo Is progressing well towards her goals.   Pt prognosis is Good.     Pt will continue to benefit from skilled outpatient physical therapy to address the deficits listed in the problem list box on initial evaluation, provide pt/family education and to maximize pt's level of independence in the home and community environment.     Pt's spiritual, cultural and educational needs considered and pt agreeable to plan of care and goals.     Anticipated barriers to physical therapy: none       GOALS:   Short Term Goals:  4 weeks  1.Report decreased shoulder pain < / =  5/10  to increase tolerance for work - MET  2. Increase AROM to 90 flexion and 30 ER at 0 abd - MET  3.  Increased strength by 1/3 MMT grade in shoulder strengthe to increase tolerance for ADL and work activities.- Progressing NOT MET  4. Pt to tolerate HEP to improve ROM and independence with ADL's-  MET     Long Term Goals: 12 weeks  1.Report decreased shoulder pain  < / =  2 /10  to increase tolerance for work- -Progressing NOT MET  2.Increase AROM to full pain free-Progressing NOT MET  3.Increase strength to >/= 4/5 in shoulder strength to increase tolerance for ADL and work activities.-Progressing NOT MET  4. Pt goal: return to gym activities pain free -Progressing NOT MET     Plan   Plan of care Certification: 1/12/2024 to 5/10/2024.    John Clarke, PT

## 2024-03-18 ENCOUNTER — OFFICE VISIT (OUTPATIENT)
Dept: ORTHOPEDICS | Facility: CLINIC | Age: 59
End: 2024-03-18
Payer: MEDICAID

## 2024-03-18 ENCOUNTER — HOSPITAL ENCOUNTER (OUTPATIENT)
Dept: RADIOLOGY | Facility: HOSPITAL | Age: 59
Discharge: HOME | End: 2024-03-18
Attending: ORTHOPAEDIC SURGERY
Payer: MEDICAID

## 2024-03-18 VITALS — WEIGHT: 134.94 LBS | RESPIRATION RATE: 18 BRPM | BODY MASS INDEX: 24.83 KG/M2 | HEIGHT: 62 IN

## 2024-03-18 DIAGNOSIS — M75.102 TEAR OF LEFT ROTATOR CUFF, UNSPECIFIED TEAR EXTENT, UNSPECIFIED WHETHER TRAUMATIC: ICD-10-CM

## 2024-03-18 DIAGNOSIS — M25.512 LEFT SHOULDER PAIN, UNSPECIFIED CHRONICITY: ICD-10-CM

## 2024-03-18 DIAGNOSIS — M25.512 LEFT SHOULDER PAIN, UNSPECIFIED CHRONICITY: Primary | ICD-10-CM

## 2024-03-18 DIAGNOSIS — M25.612 STIFFNESS OF SHOULDER JOINT, LEFT: ICD-10-CM

## 2024-03-18 DIAGNOSIS — S42.292D CLOSED 3-PART FRACTURE OF PROXIMAL HUMERUS WITH ROUTINE HEALING, LEFT: ICD-10-CM

## 2024-03-18 PROCEDURE — 99213 OFFICE O/P EST LOW 20 MIN: CPT | Mod: PBBFAC,25,PO | Performed by: ORTHOPAEDIC SURGERY

## 2024-03-18 PROCEDURE — 99024 POSTOP FOLLOW-UP VISIT: CPT | Mod: S$PBB,,, | Performed by: ORTHOPAEDIC SURGERY

## 2024-03-18 PROCEDURE — 73030 X-RAY EXAM OF SHOULDER: CPT | Mod: TC,PO,LT

## 2024-03-18 PROCEDURE — 3008F BODY MASS INDEX DOCD: CPT | Mod: CPTII,,, | Performed by: ORTHOPAEDIC SURGERY

## 2024-03-18 PROCEDURE — 1160F RVW MEDS BY RX/DR IN RCRD: CPT | Mod: CPTII,,, | Performed by: ORTHOPAEDIC SURGERY

## 2024-03-18 PROCEDURE — 99999 PR PBB SHADOW E&M-EST. PATIENT-LVL III: CPT | Mod: PBBFAC,,, | Performed by: ORTHOPAEDIC SURGERY

## 2024-03-18 PROCEDURE — 73030 X-RAY EXAM OF SHOULDER: CPT | Mod: 26,LT,, | Performed by: RADIOLOGY

## 2024-03-18 PROCEDURE — 1159F MED LIST DOCD IN RCRD: CPT | Mod: CPTII,,, | Performed by: ORTHOPAEDIC SURGERY

## 2024-03-18 NOTE — PROGRESS NOTES
Past Medical History:   Diagnosis Date    Anxiety     Hypothyroidism        Past Surgical History:   Procedure Laterality Date    AUGMENTATION OF BREAST      breast augmentation  10 years ago     SECTION      hemmorhiodectomy      tummy tuck      10 years ago       Current Outpatient Medications   Medication Sig    ALPRAZolam (XANAX) 0.5 MG tablet Take 1 tablet (0.5 mg total) by mouth nightly as needed for Anxiety.    EScitalopram oxalate (LEXAPRO) 10 MG tablet Take 1 tablet (10 mg total) by mouth once daily.    fluticasone propionate (FLONASE) 50 mcg/actuation nasal spray 1 spray (50 mcg total) by Each Nostril route once daily.    HYDROcodone-acetaminophen (NORCO) 5-325 mg per tablet Take 1 tablet by mouth every 4 (four) hours as needed for Pain.    HYDROcodone-acetaminophen (NORCO) 5-325 mg per tablet Take 1 tablet by mouth every 6 (six) hours as needed for Pain.    SYNTHROID 75 mcg tablet Take 75 mcg by mouth before breakfast.    valACYclovir (VALTREX) 1000 MG tablet Take 2,000 mg by mouth 2 (two) times daily.    VITAMIN D3 25 mcg (1,000 unit) tablet Take 1,000 Units by mouth once daily.     Current Facility-Administered Medications   Medication    testosterone cypionate injection 76 mg       Review of patient's allergies indicates:  No Known Allergies    Family History   Problem Relation Age of Onset    Diabetes Mother     COPD Mother     Anxiety disorder Son     Colon cancer Paternal Grandfather        Social History     Socioeconomic History    Marital status:    Tobacco Use    Smoking status: Never    Smokeless tobacco: Never   Substance and Sexual Activity    Alcohol use: No    Drug use: No    Sexual activity: Yes     Partners: Male     Birth control/protection: None     Social Determinants of Health     Food Insecurity: No Food Insecurity (2022)    Hunger Vital Sign     Worried About Running Out of Food in the Last Year: Never true     Ran Out of Food in the Last Year: Never true    Transportation Needs: No Transportation Needs (6/14/2022)    PRAPARE - Transportation     Lack of Transportation (Medical): No     Lack of Transportation (Non-Medical): No   Physical Activity: Sufficiently Active (6/14/2022)    Exercise Vital Sign     Days of Exercise per Week: 5 days     Minutes of Exercise per Session: 30 min   Stress: Stress Concern Present (6/14/2022)    Citizen of [place] of Occupational Health - Occupational Stress Questionnaire     Feeling of Stress : To some extent   Social Connections: Unknown (6/14/2022)    Social Connection and Isolation Panel [NHANES]     Frequency of Communication with Friends and Family: More than three times a week     Frequency of Social Gatherings with Friends and Family: More than three times a week     Active Member of Clubs or Organizations: Yes     Attends Club or Organization Meetings: More than 4 times per year     Marital Status:    Housing Stability: High Risk (6/14/2022)    Housing Stability Vital Sign     Unable to Pay for Housing in the Last Year: Yes     Unstable Housing in the Last Year: No       Chief Complaint:   Chief Complaint   Patient presents with    Follow-up     Follow up left shoulder proximal humerus fracture. Doi 12/16/23       History of present illness:  59-year-old female seen for emergency room follow-up after a fall on December 16th where she injured her left shoulder.  Patient tripped over a fan and landed onto her left shoulder.  Pain is 3/10.  Doing the physical therapy.  Making making some progress but not very rapidly.  Most of her pain is particularly with abduction.      Review of Systems:  Musculoskeletal:  See HPI      Physical Examination:    Vital Signs:    Vitals:    03/18/24 1357   Resp: 18         Body mass index is 24.68 kg/m².    This a well-developed, well nourished patient in no acute distress.  They are alert and oriented and cooperative to examination.  Pt. walks without an antalgic gait.      Examination  left shoulder shows mild swelling.  Patient is able to wiggle her fingers and thumb.  Brisk capillary refill and intact light touch sensation in all digits.  Forward flexion of about 105° with external rotation of 70°.  Seems to be about the same as it was at her last visit.    X-rays:  X-rays left shoulder are ordered and review which show a 3 part proximal humerus fracture in acceptable alignment with minimal displacement but no significant change from previous.  Continued callus formation noted.     Assessment::  Left 3 part proximal humerus fracture  Left shoulder stiffness    Plan:  Reviewed the findings with her today.  Continue the physical therapy.  I recommended getting an MRI to make sure she does not have any soft tissue issues such as a rotator cuff.  It is looking like we probably will need to go in there and do an arthroscopic lysis of adhesion as well as a manipulation.  Would like to know if there is anything further that needs to be surgically addressed when we do this.  I will see her back in 4 weeks    All previous pertinent notes including ER visits, physical therapy visits, other orthopedic visits as well as other care for the same musculoskeletal problem were reviewed.  All pertinent lab values and previous imaging was reviewed pertinent to the current visit.    This note was created using [employer] voice recognition software that occasionally misinterpreted phrases or words.    Consult note is delivered via Epic messaging service.

## 2024-03-20 ENCOUNTER — CLINICAL SUPPORT (OUTPATIENT)
Dept: REHABILITATION | Facility: HOSPITAL | Age: 59
End: 2024-03-20
Payer: MEDICAID

## 2024-03-20 DIAGNOSIS — M25.619 DECREASED RANGE OF MOTION OF SHOULDER, UNSPECIFIED LATERALITY: Primary | ICD-10-CM

## 2024-03-20 PROCEDURE — 97110 THERAPEUTIC EXERCISES: CPT | Mod: PN

## 2024-03-20 NOTE — PROGRESS NOTES
Physical Therapy Treatment Note      Name: Leobardo Rueda  Clinic Number: [id number]    Therapy Diagnosis:   Encounter Diagnosis   Name Primary?    Decreased range of motion of shoulder, unspecified laterality Yes     Physician: Austen Magaña,*    Visit Date: 3/20/2024    Physician Orders: PT Eval and Treat   Medical Diagnosis from Referral: S42.292D (ICD-10-CM) - Closed 3-part fracture of proximal humerus with routine healing, left   Evaluation Date: 1/12/2024  Authorization Period Expiration: 12/31/2024  Plan of Care Expiration: 5/10/2024  Visit # / Visits authorized: 15/20     Time In: 800 am  Time Out: 853 am  Total Appointment Time (timed & untimed codes): 53 minutes    Precautions: Standard    Subjective     Pt reports: shoulder is still tight. She states pain is less at this point.     She was compliant with home exercise program.  Response to previous treatment: no issues   Functional change: ongoing    Pain: 1/10  Location: left shoulder      Objective     PT tech assisted with session    AROM Shoulder:   Flexion: 110 degrees (passive: 127 degrees)   ER at 0 abd: 40 degrees    ER at 45 abd: 45 degrees; firm   ER at 90 abd: 20 degrees; firm   MMT: LUE   ER: 4-/5   IR: 4/5   Muscle Length: mild tightness of lat    Leobardo received therapeutic exercises to develop strength, endurance, ROM, flexibility and posture for 42 minutes including:    Assessment above  UBE 3 min Fwd/3min Bwd Level 2   Supine AAROM with hands clasped  x 20; 5s holds   Seated Arm prop at 90 abd 3# for inferior glide x 4 minutes   R SL Rotator Interval Stretch x 4 minutes   Supine Active ER to IR (neutral) at 45 abd x 20; 3s holds   SL ER 3 x 8   Supine resting at 80 abd on towel ER with dowel x 20; 10s holds    SL Serratus Landmines 3 x 10   Standing Stair railing assisted serratus landmines x 20; 5s holds    ER walkout YTB 3 x 10; cueing for neutral wrist  IR walkout YTB 3 x 10; cueing for neutral wrist  Walking with BUE flexion;  hands together 4 x 10 yards  Education - HEP / no lifting more than 1lb (coffee cup)     Leobardo received the following manual therapy techniques: Joint mobilizations were applied to the: (L) shoulder for 11 minutes, including:    Lateral distraction   Manual pendulums   Posterior glide; grade3  Inferior glide; grade 3  Contract relax lat 5 x 8s     Home Exercises Provided and Patient Education Provided     Education provided:   - HEP  - no lifting heavier than a coffee cup    Written Home Exercises Provided: yes.  Exercises were reviewed and Leobardo was able to demonstrate them prior to the end of the session.  Leobardo demonstrated good  understanding of the education provided.     See EMR under Patient Instructions for exercises provided 1/19/2024.    Assessment     Leobardo continues to lack significant ROM in capsular pattern. Signs of symptoms of Frozen shoulder considering where we started to now in this POC. Rotator interval stretch applied this session which improved ROM into ER. Added this to HEP. She is planning to get an Mri and possible manipulation after seeing her doctor. Will continue to progress as tolerated.     Leobardo Is progressing well towards her goals.   Pt prognosis is Good.     Pt will continue to benefit from skilled outpatient physical therapy to address the deficits listed in the problem list box on initial evaluation, provide pt/family education and to maximize pt's level of independence in the home and community environment.     Pt's spiritual, cultural and educational needs considered and pt agreeable to plan of care and goals.     Anticipated barriers to physical therapy: none       GOALS:   Short Term Goals:  4 weeks  1.Report decreased shoulder pain < / =  5/10  to increase tolerance for work - MET  2. Increase AROM to 90 flexion and 30 ER at 0 abd - MET  3. Increased strength by 1/3 MMT grade in shoulder strengthe to increase tolerance for ADL and work activities.- Progressing NOT  MET  4. Pt to tolerate HEP to improve ROM and independence with ADL's-  MET     Long Term Goals: 12 weeks  1.Report decreased shoulder pain  < / =  2 /10  to increase tolerance for work- -Progressing NOT MET  2.Increase AROM to full pain free-Progressing NOT MET  3.Increase strength to >/= 4/5 in shoulder strength to increase tolerance for ADL and work activities.-Progressing NOT MET  4. Pt goal: return to gym activities pain free -Progressing NOT MET     Plan   Plan of care Certification: 1/12/2024 to 5/10/2024.    John Clarke, PT

## 2024-03-25 ENCOUNTER — HOSPITAL ENCOUNTER (OUTPATIENT)
Dept: RADIOLOGY | Facility: HOSPITAL | Age: 59
Discharge: HOME | End: 2024-03-25
Attending: ORTHOPAEDIC SURGERY
Payer: MEDICAID

## 2024-03-25 DIAGNOSIS — M25.512 LEFT SHOULDER PAIN, UNSPECIFIED CHRONICITY: ICD-10-CM

## 2024-03-25 DIAGNOSIS — M75.102 TEAR OF LEFT ROTATOR CUFF, UNSPECIFIED TEAR EXTENT, UNSPECIFIED WHETHER TRAUMATIC: ICD-10-CM

## 2024-03-25 PROCEDURE — 73221 MRI JOINT UPR EXTREM W/O DYE: CPT | Mod: 26,LT,, | Performed by: RADIOLOGY

## 2024-03-25 PROCEDURE — 73221 MRI JOINT UPR EXTREM W/O DYE: CPT | Mod: TC,LT

## 2024-03-27 ENCOUNTER — CLINICAL SUPPORT (OUTPATIENT)
Dept: REHABILITATION | Facility: HOSPITAL | Age: 59
End: 2024-03-27
Payer: MEDICAID

## 2024-03-27 DIAGNOSIS — M25.619 DECREASED RANGE OF MOTION OF SHOULDER, UNSPECIFIED LATERALITY: Primary | ICD-10-CM

## 2024-03-27 PROCEDURE — 97110 THERAPEUTIC EXERCISES: CPT | Mod: PN,CQ

## 2024-03-27 NOTE — PROGRESS NOTES
Physical Therapy Treatment Note      Name: Leobardo Rueda  Clinic Number: [id number]    Therapy Diagnosis:   Encounter Diagnosis   Name Primary?    Decreased range of motion of shoulder, unspecified laterality Yes     Physician: Austen Magaña,*    Visit Date: 3/27/2024    Physician Orders: PT Eval and Treat   Medical Diagnosis from Referral: S42.292D (ICD-10-CM) - Closed 3-part fracture of proximal humerus with routine healing, left   Evaluation Date: 1/12/2024  Authorization Period Expiration: 12/31/2024  Plan of Care Expiration: 5/10/2024  Visit # / Visits authorized: 16/20     Time In: 800 am  Time Out: 853 am  Total Appointment Time (timed & untimed codes): 53 minutes    Precautions: Standard    Subjective     Pt reports: she got her MRI done on Monday. Just waiting to speak with her doctor about her results.     She was compliant with home exercise program.  Response to previous treatment: no issues   Functional change: ongoing    Pain: 1/10  Location: left shoulder      Objective       AROM Shoulder:   Flexion: 110 degrees (passive: 127 degrees)   ER at 0 abd: 40 degrees    ER at 45 abd: 45 degrees; firm   ER at 90 abd: 20 degrees; firm   MMT: LUE   ER: 4-/5   IR: 4/5   Muscle Length: mild tightness of lat    Leobardo received therapeutic exercises to develop strength, endurance, ROM, flexibility and posture for 42 minutes including:    Assessment above  UBE 3 min Fwd/3min Bwd Level 2   Supine AAROM with hands clasped  x 20; 5s holds   Seated Arm prop at 90 abd 3# for inferior glide x 4 minutes   R SL Rotator Interval Stretch x 4 minutes   Supine Active ER to IR (neutral) at 45 abd x 20; 3s holds   SL ER 3 x 8   Supine resting at 80 abd on towel ER with dowel x 20; 10s holds    SL Serratus Landmines 3 x 10   Standing Stair railing assisted serratus landmines x 20; 5s holds    ER walkout YTB 3 x 10; cueing for neutral wrist  IR walkout YTB 3 x 10; cueing for neutral wrist  Walking with BUE flexion; hands  together 4 x 10 yards  Education - HEP / no lifting more than 1lb (coffee cup)     Leobardo received the following manual therapy techniques: Joint mobilizations were applied to the: (L) shoulder for 11 minutes, including:    Lateral distraction   Manual pendulums   Posterior glide; grade3  Inferior glide; grade 3  Contract relax lat 5 x 8s     Home Exercises Provided and Patient Education Provided     Education provided:   - HEP  - no lifting heavier than a coffee cup    Written Home Exercises Provided: yes.  Exercises were reviewed and Leobardo was able to demonstrate them prior to the end of the session.  Leobardo demonstrated good  understanding of the education provided.     See EMR under Patient Instructions for exercises provided 1/19/2024.    Assessment     Responds well to rotator interval stretch but requires intermittent breaks at times. Unable to apply weight at this time due to reports of discomfort with it. All exercises as performed with in tolerance. She notes with mild improvement in shoulder flexion post manual as per patient reports. Will continue to progress range of motion and strength within patients tolerance.     Leobardo Is progressing well towards her goals.   Pt prognosis is Good.     Pt will continue to benefit from skilled outpatient physical therapy to address the deficits listed in the problem list box on initial evaluation, provide pt/family education and to maximize pt's level of independence in the home and community environment.     Pt's spiritual, cultural and educational needs considered and pt agreeable to plan of care and goals.     Anticipated barriers to physical therapy: none       GOALS:   Short Term Goals:  4 weeks  1.Report decreased shoulder pain < / =  5/10  to increase tolerance for work - MET  2. Increase AROM to 90 flexion and 30 ER at 0 abd - MET  3. Increased strength by 1/3 MMT grade in shoulder strengthe to increase tolerance for ADL and work activities.- Progressing NOT  MET  4. Pt to tolerate HEP to improve ROM and independence with ADL's-  MET     Long Term Goals: 12 weeks  1.Report decreased shoulder pain  < / =  2 /10  to increase tolerance for work- -Progressing NOT MET  2.Increase AROM to full pain free-Progressing NOT MET  3.Increase strength to >/= 4/5 in shoulder strength to increase tolerance for ADL and work activities.-Progressing NOT MET  4. Pt goal: return to gym activities pain free -Progressing NOT MET     Plan   Plan of care Certification: 1/12/2024 to 5/10/2024.    Idalia Talbert, PTA

## 2024-04-01 ENCOUNTER — TELEPHONE (OUTPATIENT)
Dept: OBSTETRICS AND GYNECOLOGY | Facility: CLINIC | Age: 59
End: 2024-04-01
Payer: MEDICAID

## 2024-04-03 ENCOUNTER — CLINICAL SUPPORT (OUTPATIENT)
Dept: REHABILITATION | Facility: HOSPITAL | Age: 59
End: 2024-04-03
Payer: MEDICAID

## 2024-04-03 DIAGNOSIS — M25.619 DECREASED RANGE OF MOTION OF SHOULDER, UNSPECIFIED LATERALITY: Primary | ICD-10-CM

## 2024-04-03 PROCEDURE — 97110 THERAPEUTIC EXERCISES: CPT | Mod: PN

## 2024-04-03 NOTE — PROGRESS NOTES
Physical Therapy Treatment Note and Progress Note     Name: Leobardo Rueda  Clinic Number: [id number]    Therapy Diagnosis:   Encounter Diagnosis   Name Primary?    Decreased range of motion of shoulder, unspecified laterality Yes     Physician: Austen Magaña,*    Visit Date: 4/3/2024    Physician Orders: PT Eval and Treat   Medical Diagnosis from Referral: S42.292D (ICD-10-CM) - Closed 3-part fracture of proximal humerus with routine healing, left   Evaluation Date: 1/12/2024  Authorization Period Expiration: 12/31/2024  Plan of Care Expiration: 5/10/2024  Visit # / Visits authorized: 17/20     Time In: 800 am  Time Out: 853 am  Total Appointment Time (timed & untimed codes): 53 minutes    Precautions: Standard    Subjective     Pt reports: she still has not spoken with her doctor yet regarding her MRI.     She was compliant with home exercise program.  Response to previous treatment: no issues   Functional change: ongoing    Pain: 1/10  Location: left shoulder      Objective       AROM Shoulder:   Flexion: 100 degrees standing (supine: 130 degrees)   ER at 0 abd: 45 degrees    ER at 45 abd: 40 degrees; empty   ER at 90 abd: 20 degrees; firm and painful    Leobardo received therapeutic exercises to develop strength, endurance, ROM, flexibility and posture for 39 minutes including:    Assessment above  UBE 3 min Fwd/3min Bwd Level 2   Supine AAROM with hands clasped x 20; 5s holds   Supine No Money with YTB 3 x 10   Supine Serratus Punch 3 x 8 with towel support in 45 abd   Finger Ladder AAROM x 10 up/down  Standing Stair railing assisted serratus landmines x 20; 5s holds    ER walkout YTB 3 x 10; cueing for neutral wrist  IR walkout YTB 3 x 10; cueing for neutral wrist  Education - HEP / no lifting more than 1lb (coffee cup)    Not today:   R SL Rotator Interval Stretch x 4 minutes      Leobardo received the following manual therapy techniques: Joint mobilizations were applied to the: (L) shoulder for 14  minutes, including:    Lateral distraction   Manual pendulums   Posterior glide; grade3  Inferior glide at 90 abd; grade 3  ER MWM at 90 abd     Home Exercises Provided and Patient Education Provided     Education provided:   - HEP  - no lifting heavier than a coffee cup    Written Home Exercises Provided: yes.  Exercises were reviewed and Leobardo was able to demonstrate them prior to the end of the session.  Leobardo demonstrated good  understanding of the education provided.     See EMR under Patient Instructions for exercises provided 1/19/2024.    Assessment     Leobardo continues to lack significant ROM into ER in elevated ranges with only 20 degrees of ER at 90 abd. Since implementation on rotator interval stretch, ER at 0/45 has improved and feels less guarded. She continues to be a good candidate for PT to promote ROM/strength/coordination to promote return to PLOF. She requires verbal and tactile cues to perform most exercises correctly. She is set to see MD on 4/18 with review of imaging. Will continue to progress as tolerated. Pt reminded to spend increased time resting with arm at 90 abd for up to 5 minutes 3x/day with new home exercise provided. Pt informed to have  assist in set up for exercise.     Leobardo Is progressing well towards her goals.   Pt prognosis is Good.     Pt will continue to benefit from skilled outpatient physical therapy to address the deficits listed in the problem list box on initial evaluation, provide pt/family education and to maximize pt's level of independence in the home and community environment.     Pt's spiritual, cultural and educational needs considered and pt agreeable to plan of care and goals.     Anticipated barriers to physical therapy: none       GOALS:   Short Term Goals:  4 weeks  1.Report decreased shoulder pain < / =  5/10  to increase tolerance for work - MET  2. Increase AROM to 90 flexion and 30 ER at 0 abd - MET  3. Increased strength by 1/3 MMT grade in  shoulder strengthe to increase tolerance for ADL and work activities.- Progressing NOT MET  4. Pt to tolerate HEP to improve ROM and independence with ADL's-  MET     Long Term Goals: 12 weeks  1.Report decreased shoulder pain  < / =  2 /10  to increase tolerance for work- -Progressing NOT MET  2.Increase AROM to full pain free-Progressing NOT MET  3.Increase strength to >/= 4/5 in shoulder strength to increase tolerance for ADL and work activities.-Progressing NOT MET  4. Pt goal: return to gym activities pain free -Progressing NOT MET     Plan   Plan of care Certification: 1/12/2024 to 5/10/2024.    John Clarke, PT

## 2024-04-04 ENCOUNTER — TELEPHONE (OUTPATIENT)
Dept: OBSTETRICS AND GYNECOLOGY | Facility: CLINIC | Age: 59
End: 2024-04-04
Payer: MEDICAID

## 2024-04-05 ENCOUNTER — CLINICAL SUPPORT (OUTPATIENT)
Dept: OBSTETRICS AND GYNECOLOGY | Facility: CLINIC | Age: 59
End: 2024-04-05
Payer: MEDICAID

## 2024-04-05 DIAGNOSIS — N95.1 SYMPTOMATIC MENOPAUSAL OR FEMALE CLIMACTERIC STATES: Primary | ICD-10-CM

## 2024-04-05 PROCEDURE — 96372 THER/PROPH/DIAG INJ SC/IM: CPT | Mod: PBBFAC

## 2024-04-05 PROCEDURE — 99999PBSHW PR PBB SHADOW TECHNICAL ONLY FILED TO HB: Mod: PBBFAC,,,

## 2024-04-05 RX ADMIN — TESTOSTERONE CYPIONATE 76 MG: 200 INJECTION, SOLUTION INTRAMUSCULAR at 09:04

## 2024-04-08 ENCOUNTER — CLINICAL SUPPORT (OUTPATIENT)
Dept: REHABILITATION | Facility: HOSPITAL | Age: 59
End: 2024-04-08
Payer: MEDICAID

## 2024-04-08 DIAGNOSIS — M25.619 DECREASED RANGE OF MOTION OF SHOULDER, UNSPECIFIED LATERALITY: Primary | ICD-10-CM

## 2024-04-08 PROCEDURE — 97110 THERAPEUTIC EXERCISES: CPT | Mod: PN,CQ

## 2024-04-08 NOTE — PROGRESS NOTES
"  Physical Therapy Treatment Note and Progress Note     Name: Leobardo Rueda  Clinic Number: [id number]    Therapy Diagnosis:   Encounter Diagnosis   Name Primary?    Decreased range of motion of shoulder, unspecified laterality Yes     Physician: Austen Magaña,*    Visit Date: 4/8/2024    Physician Orders: PT Eval and Treat   Medical Diagnosis from Referral: S42.292D (ICD-10-CM) - Closed 3-part fracture of proximal humerus with routine healing, left   Evaluation Date: 1/12/2024  Authorization Period Expiration: 12/31/2024  Plan of Care Expiration: 5/10/2024  Visit # / Visits authorized: 18/20     Time In: 805 am  Time Out: 0900 am  Total Appointment Time (timed & untimed codes): 53 minutes  Physical Therapy technician assisted with treatment under direct supervision of treating therapist.    Precautions: Standard    Subjective     Pt reports: soreness following last treatment session but "nothing bad."     She was compliant with home exercise program.  Response to previous treatment: no issues   Functional change: ongoing    Pain: 1/10  Location: left shoulder      Objective       AROM Shoulder:   Flexion: 100 degrees standing (supine: 130 degrees)   ER at 0 abd: 45 degrees    ER at 45 abd: 40 degrees; empty   ER at 90 abd: 20 degrees; firm and painful    Leobardo received therapeutic exercises to develop strength, endurance, ROM, flexibility and posture for 39 minutes including:    Assessment above  UBE 3 min Fwd/3min Bwd Level 2   Supine AAROM with hands clasped x 20; 5s holds   Supine No Money with YTB 3 x 10   Supine Serratus Punch 3 x 8 with towel support in 45 abd   Finger Ladder AAROM x 10 up/down  Standing Stair railing assisted serratus landmines x 20; 5s holds    ER walkout YTB 3 x 10; cueing for neutral wrist  IR walkout YTB 3 x 10; cueing for neutral wrist  Education - HEP / no lifting more than 1lb (coffee cup)    Not today:   R SL Rotator Interval Stretch x 4 minutes      Leobardo received the " following manual therapy techniques: Joint mobilizations were applied to the: (L) shoulder for 14 minutes, including:    Lateral distraction   Manual pendulums   Posterior glide; grade3  Inferior glide at 90 abd; grade 3  ER MWM at 90 abd     Home Exercises Provided and Patient Education Provided     Education provided:   - HEP  - no lifting heavier than a coffee cup    Written Home Exercises Provided: yes.  Exercises were reviewed and Leobardo was able to demonstrate them prior to the end of the session.  Leobardo demonstrated good  understanding of the education provided.     See EMR under Patient Instructions for exercises provided 1/19/2024.    Assessment     Leobardo notes with good tolerance to treatment. Continues to focus on improving range of motion and strengthen within tolerance to improve ability to perform over head activities. Will continue to progress as per patients tolerance.     Leobardo Is progressing well towards her goals.   Pt prognosis is Good.     Pt will continue to benefit from skilled outpatient physical therapy to address the deficits listed in the problem list box on initial evaluation, provide pt/family education and to maximize pt's level of independence in the home and community environment.     Pt's spiritual, cultural and educational needs considered and pt agreeable to plan of care and goals.     Anticipated barriers to physical therapy: none       GOALS:   Short Term Goals:  4 weeks  1.Report decreased shoulder pain < / =  5/10  to increase tolerance for work - MET  2. Increase AROM to 90 flexion and 30 ER at 0 abd - MET  3. Increased strength by 1/3 MMT grade in shoulder strengthe to increase tolerance for ADL and work activities.- Progressing NOT MET  4. Pt to tolerate HEP to improve ROM and independence with ADL's-  MET     Long Term Goals: 12 weeks  1.Report decreased shoulder pain  < / =  2 /10  to increase tolerance for work- -Progressing NOT MET  2.Increase AROM to full pain  free-Progressing NOT MET  3.Increase strength to >/= 4/5 in shoulder strength to increase tolerance for ADL and work activities.-Progressing NOT MET  4. Pt goal: return to gym activities pain free -Progressing NOT MET     Plan   Plan of care Certification: 1/12/2024 to 5/10/2024.    Idalia Talbert, PTA

## 2024-04-10 ENCOUNTER — CLINICAL SUPPORT (OUTPATIENT)
Dept: REHABILITATION | Facility: HOSPITAL | Age: 59
End: 2024-04-10
Payer: MEDICAID

## 2024-04-10 DIAGNOSIS — M25.619 DECREASED RANGE OF MOTION OF SHOULDER, UNSPECIFIED LATERALITY: Primary | ICD-10-CM

## 2024-04-10 PROCEDURE — 97110 THERAPEUTIC EXERCISES: CPT | Mod: PN

## 2024-04-10 NOTE — PROGRESS NOTES
Physical Therapy Treatment Note     Name: Leobardo Rueda  Clinic Number: [id number]    Therapy Diagnosis:   Encounter Diagnosis   Name Primary?    Decreased range of motion of shoulder, unspecified laterality Yes     Physician: Austen Magaña,*    Visit Date: 4/10/2024    Physician Orders: PT Eval and Treat   Medical Diagnosis from Referral: S42.292D (ICD-10-CM) - Closed 3-part fracture of proximal humerus with routine healing, left   Evaluation Date: 1/12/2024  Authorization Period Expiration: 12/31/2024  Plan of Care Expiration: 5/10/2024  Visit # / Visits authorized: 19/20     Time In: 8:00 am  Time Out: 8:53 am  Total Appointment Time (timed & untimed codes): 53 minutes  Physical Therapy technician assisted with treatment under direct supervision of treating therapist.    Precautions: Standard    Subjective     Pt reports: she feels like she can bring her arm out to the side now better. States she has been doing her stretches every day. States she has not had a follow up     She was compliant with home exercise program.  Response to previous treatment: no issues   Functional change: ongoing    Pain: 1/10  Location: left shoulder      Objective       AROM Shoulder:   Flexion: 120 degrees standing (supine: 140 degrees)   ER at 0 abd: 45 degrees    ER at 45 abd: 40 degrees; empty   ER at 90 abd: 20 degrees; firm and painful    Leobardo received therapeutic exercises to develop strength, endurance, ROM, flexibility and posture for 40 minutes including:    Assessment above  UBE 3 min Fwd/3min Bwd Level 2   Seated Inf glide stretch with 4# weight x 3 minutes  Supine AAROM with hands clasped x 20; 5s holds   SL Serratus Landmines with dowel 2 x 10, 3s holds   SL ER with towel 3 x 10; 3s eccentric   Prone Scap retraction with arm resting at 90 flexion; 3 x 8     ER walkout RTB 3 x 10; cueing for neutral wrist  IR walkout GTB 3 x 10; cueing for neutral wrist  ER Wall Isometrics at 60 degrees elevation x 20; 3s  holds   Education - HEP / no lifting more than 1lb (coffee cup)    Not today:   R SL Rotator Interval Stretch x 4 minutes      Leobardo received the following manual therapy techniques: Joint mobilizations were applied to the: (L) shoulder for 13 minutes, including:    Posterior glide; grade 4  Inferior glide at 90 abd; grade 4  SL MWM with scapular downward rotation into flexion  ER MWM at 90 abd   Contract relax at 90 abd to improve ER 5 x 8s     Home Exercises Provided and Patient Education Provided     Education provided:   - HEP  - no lifting heavier than a coffee cup    Written Home Exercises Provided: yes.  Exercises were reviewed and Leobardo was able to demonstrate them prior to the end of the session.  Leobardo demonstrated good  understanding of the education provided.     See EMR under Patient Instructions for exercises provided 1/19/2024.    Assessment     Leobardo demonstrates significant improvement ROM to date. Flexion into AROM has improved by 20 degrees with less pain reported. She was able to tolerate more progressive loading this date of her RTC with SL ER as well as RTB/GTB walkouts. Will continue to progress as tolerated. Pt to see MD 4/18 for f/u. Will message him regarding progress prior to this appointment. HEP updated with updated strengthening exercises.     Leobardo Is progressing well towards her goals.   Pt prognosis is Good.     Pt will continue to benefit from skilled outpatient physical therapy to address the deficits listed in the problem list box on initial evaluation, provide pt/family education and to maximize pt's level of independence in the home and community environment.     Pt's spiritual, cultural and educational needs considered and pt agreeable to plan of care and goals.     Anticipated barriers to physical therapy: none       GOALS:   Short Term Goals:  4 weeks  1.Report decreased shoulder pain < / =  5/10  to increase tolerance for work - MET  2. Increase AROM to 90 flexion and  30 ER at 0 abd - MET  3. Increased strength by 1/3 MMT grade in shoulder strengthe to increase tolerance for ADL and work activities.- Progressing NOT MET  4. Pt to tolerate HEP to improve ROM and independence with ADL's-  MET     Long Term Goals: 12 weeks  1.Report decreased shoulder pain  < / =  2 /10  to increase tolerance for work- -Progressing NOT MET  2.Increase AROM to full pain free-Progressing NOT MET  3.Increase strength to >/= 4/5 in shoulder strength to increase tolerance for ADL and work activities.-Progressing NOT MET  4. Pt goal: return to gym activities pain free -Progressing NOT MET     Plan   Plan of care Certification: 1/12/2024 to 5/10/2024.    John Clarke, PT

## 2024-04-12 NOTE — PROGRESS NOTES
Past Medical History:   Diagnosis Date    Anxiety     Hypothyroidism        Past Surgical History:   Procedure Laterality Date    AUGMENTATION OF BREAST      breast augmentation  10 years ago     SECTION      hemmorhiodectomy      tummy tuck      10 years ago       Current Outpatient Medications   Medication Sig    ALPRAZolam (XANAX) 0.5 MG tablet Take 1 tablet (0.5 mg total) by mouth nightly as needed for Anxiety.    EScitalopram oxalate (LEXAPRO) 10 MG tablet Take 1 tablet (10 mg total) by mouth once daily.    fluticasone propionate (FLONASE) 50 mcg/actuation nasal spray 1 spray (50 mcg total) by Each Nostril route once daily.    HYDROcodone-acetaminophen (NORCO) 5-325 mg per tablet Take 1 tablet by mouth every 4 (four) hours as needed for Pain.    HYDROcodone-acetaminophen (NORCO) 5-325 mg per tablet Take 1 tablet by mouth every 6 (six) hours as needed for Pain.    ondansetron (ZOFRAN-ODT) 4 MG TbDL Take 1 tablet (4 mg total) by mouth every 8 (eight) hours as needed (nausea).    SYNTHROID 75 mcg tablet Take 75 mcg by mouth before breakfast.    valACYclovir (VALTREX) 1000 MG tablet Take 2,000 mg by mouth 2 (two) times daily.    VITAMIN D3 25 mcg (1,000 unit) tablet Take 1,000 Units by mouth once daily.     No current facility-administered medications for this visit.       Review of patient's allergies indicates:  No Known Allergies    Family History   Problem Relation Age of Onset    Diabetes Mother     COPD Mother     Anxiety disorder Son     Colon cancer Paternal Grandfather        Social History     Socioeconomic History    Marital status:    Tobacco Use    Smoking status: Never    Smokeless tobacco: Never   Substance and Sexual Activity    Alcohol use: No    Drug use: No    Sexual activity: Yes     Partners: Male     Birth control/protection: None     Social Determinants of Health     Food Insecurity: No Food Insecurity (2022)    Hunger Vital Sign     Worried About Running Out of Food  in the Last Year: Never true     Ran Out of Food in the Last Year: Never true   Transportation Needs: No Transportation Needs (6/14/2022)    PRAPARE - Transportation     Lack of Transportation (Medical): No     Lack of Transportation (Non-Medical): No   Physical Activity: Sufficiently Active (6/14/2022)    Exercise Vital Sign     Days of Exercise per Week: 5 days     Minutes of Exercise per Session: 30 min   Stress: Stress Concern Present (6/14/2022)    [employer] - Occupational Stress Questionnaire     Feeling of Stress : To some extent   Social Connections: Unknown (6/14/2022)    Social Connection and Isolation Panel [NHANES]     Frequency of Communication with Friends and Family: More than three times a week     Frequency of Social Gatherings with Friends and Family: More than three times a week     Active Member of Clubs or Organizations: Yes     Attends Club or Organization Meetings: More than 4 times per year     Marital Status:    Housing Stability: High Risk (6/14/2022)    Housing Stability Vital Sign     Unable to Pay for Housing in the Last Year: Yes     Unstable Housing in the Last Year: No       Chief Complaint:   No chief complaint on file.      History of present illness:  58-year-old female seen for emergency room follow-up after a fall on December 16th where she injured her left shoulder.  Patient tripped over a fan and landed onto her left shoulder.  Pain is 3/10.  Wearing a sling.      Review of Systems:  Musculoskeletal:  See HPI      Physical Examination:    Vital Signs:    There were no vitals filed for this visit.      There is no height or weight on file to calculate BMI.    This a well-developed, well nourished patient in no acute distress.  They are alert and oriented and cooperative to examination.  Pt. walks without an antalgic gait.      Examination left shoulder shows mild swelling.  Patient is able to wiggle her fingers and thumb.  Brisk capillary  refill and intact light touch sensation in all digits.    X-rays:  X-rays left shoulder and humerus are ordered and review which show a 3 part proximal humerus fracture in acceptable alignment with minimal displacement but no significant change from previous.  Early callus formation noted.     Assessment::  Left 3 part proximal humerus fracture    Plan:  Reviewed the findings with her today.  Fracture is well-aligned without significant displacement.  Recommended continue non operative treatment.  We will start some physical therapy and stop the sling.  Follow-up in a month with another x-ray of the left shoulder.    All previous pertinent notes including ER visits, physical therapy visits, other orthopedic visits as well as other care for the same musculoskeletal problem were reviewed.  All pertinent lab values and previous imaging was reviewed pertinent to the current visit.    This note was created using [employer] voice recognition software that occasionally misinterpreted phrases or words.    Consult note is delivered via Epic messaging service.       Pt to meet 80% estimated nutritional needs

## 2024-04-15 ENCOUNTER — TELEPHONE (OUTPATIENT)
Dept: ORTHOPEDICS | Facility: CLINIC | Age: 59
End: 2024-04-15
Payer: MEDICAID

## 2024-04-15 ENCOUNTER — CLINICAL SUPPORT (OUTPATIENT)
Dept: REHABILITATION | Facility: HOSPITAL | Age: 59
End: 2024-04-15
Payer: MEDICAID

## 2024-04-15 DIAGNOSIS — M25.619 DECREASED RANGE OF MOTION OF SHOULDER, UNSPECIFIED LATERALITY: Primary | ICD-10-CM

## 2024-04-15 PROCEDURE — 97110 THERAPEUTIC EXERCISES: CPT | Mod: PN,CQ

## 2024-04-15 NOTE — PROGRESS NOTES
"  Physical Therapy Treatment Note     Name: Leobardo Rueda  Clinic Number: [id number]    Therapy Diagnosis:   Encounter Diagnosis   Name Primary?    Decreased range of motion of shoulder, unspecified laterality Yes       Physician: Austen Magaña,*    Visit Date: 4/15/2024    Physician Orders: PT Eval and Treat   Medical Diagnosis from Referral: S42.292D (ICD-10-CM) - Closed 3-part fracture of proximal humerus with routine healing, left   Evaluation Date: 1/12/2024  Authorization Period Expiration: 12/31/2024  Plan of Care Expiration: 5/10/2024  Visit # / Visits authorized: 20/20     Time In: 8:00 am  Time Out: 8:53 am  Total Appointment Time (timed & untimed codes): 53 minutes  Physical Therapy technician assisted with treatment under direct supervision of treating therapist.    Precautions: Standard    Subjective     Pt reports: Doing well. Questions, "Will I ever be able to reach my arm up again?"    She was compliant with home exercise program.  Response to previous treatment: no issues   Functional change: ongoing    Pain: 1/10  Location: left shoulder      Objective       AROM Shoulder:   Flexion: 120 degrees standing (supine: 140 degrees)   ER at 0 abd: 45 degrees    ER at 45 abd: 40 degrees; empty   ER at 90 abd: 20 degrees; firm and painful    Leobardo received therapeutic exercises to develop strength, endurance, ROM, flexibility and posture for 40 minutes including:    Assessment above  UBE 3 min Fwd/3min Bwd Level 2   Seated Inf glide stretch with 4# weight x 3 minutes  Supine AAROM with hands clasped x 20; 5s holds   SL [employer] [place] with dowel 2 x 10, 3s holds   SL ER with towel 3 x 10; 3s eccentric   Prone Scap retraction with arm resting at 90 flexion; 3 x 8     ER walkout RTB 3 x 10; cueing for neutral wrist  IR walkout GTB 3 x 10; cueing for neutral wrist  ER Wall Isometrics at 60 degrees elevation x 20; 3s holds   Education - HEP / no lifting more than 1lb (coffee cup)    Not today:   R " SL Rotator Interval Stretch x 4 minutes      Leobardo received the following manual therapy techniques: Joint mobilizations were applied to the: (L) shoulder for 13 minutes, including:    Posterior glide; grade 4  Inferior glide at 90 abd; grade 4  SL MWM with scapular downward rotation into flexion  ER MWM at 90 abd   Contract relax at 90 abd to improve ER 5 x 8s     Home Exercises Provided and Patient Education Provided     Education provided:   - HEP  - no lifting heavier than a coffee cup    Written Home Exercises Provided: yes.  Exercises were reviewed and Leobardo was able to demonstrate them prior to the end of the session.  Leobardo demonstrated good  understanding of the education provided.     See EMR under Patient Instructions for exercises provided 1/19/2024.    Assessment     Leobardo noted with good tolerance to treatment. Continues to focus on range of motion and strength as tolerated.  Awaiting upcoming Medical Doctor appointment this Thursday 4/18. Will progress as able.       Leobardo Is progressing well towards her goals.   Pt prognosis is Good.     Pt will continue to benefit from skilled outpatient physical therapy to address the deficits listed in the problem list box on initial evaluation, provide pt/family education and to maximize pt's level of independence in the home and community environment.     Pt's spiritual, cultural and educational needs considered and pt agreeable to plan of care and goals.     Anticipated barriers to physical therapy: none       GOALS:   Short Term Goals:  4 weeks  1.Report decreased shoulder pain < / =  5/10  to increase tolerance for work - MET  2. Increase AROM to 90 flexion and 30 ER at 0 abd - MET  3. Increased strength by 1/3 MMT grade in shoulder strengthe to increase tolerance for ADL and work activities.- Progressing NOT MET  4. Pt to tolerate HEP to improve ROM and independence with ADL's-  MET     Long Term Goals: 12 weeks  1.Report decreased shoulder pain  < / =   2 /10  to increase tolerance for work- -Progressing NOT MET  2.Increase AROM to full pain free-Progressing NOT MET  3.Increase strength to >/= 4/5 in shoulder strength to increase tolerance for ADL and work activities.-Progressing NOT MET  4. Pt goal: return to gym activities pain free -Progressing NOT MET     Plan   Plan of care Certification: 1/12/2024 to 5/10/2024.    Idalia Talbert, PTA

## 2024-04-15 NOTE — TELEPHONE ENCOUNTER
I have left multiple voicemails for patient informing her that Dr. Magaña will not be in clinic Thursday and that I can help get her rescheduled.

## 2024-04-17 ENCOUNTER — CLINICAL SUPPORT (OUTPATIENT)
Dept: REHABILITATION | Facility: HOSPITAL | Age: 59
End: 2024-04-17
Payer: MEDICAID

## 2024-04-17 DIAGNOSIS — M25.619 DECREASED RANGE OF MOTION OF SHOULDER, UNSPECIFIED LATERALITY: Primary | ICD-10-CM

## 2024-04-17 PROCEDURE — 97110 THERAPEUTIC EXERCISES: CPT | Mod: PN,CQ

## 2024-04-17 NOTE — PROGRESS NOTES
Physical Therapy Treatment Note     Name: Leobardo Rueda  Clinic Number: [id number]    Therapy Diagnosis:   Encounter Diagnosis   Name Primary?    Decreased range of motion of shoulder, unspecified laterality Yes       Physician: Austen Magaña,*    Visit Date: 4/17/2024    Physician Orders: PT Eval and Treat   Medical Diagnosis from Referral: S42.292D (ICD-10-CM) - Closed 3-part fracture of proximal humerus with routine healing, left   Evaluation Date: 1/12/2024  Authorization Period Expiration: 12/31/2024  Plan of Care Expiration: 5/10/2024  Visit # / Visits authorized: 21/20     Time In: 8:00 am  Time Out: 8:53 am  Total Appointment Time (timed & untimed codes): 53 minutes      Precautions: Standard    Subjective     Pt reports: Improved tolerance to sidelying RTC stretch. Also reports she can now abduct her shoulder in standing which she wasn't able to do recently.     She was compliant with home exercise program.  Response to previous treatment: no issues   Functional change: ongoing    Pain: 1/10  Location: left shoulder      Objective       AROM Shoulder:   Flexion: 120 degrees standing (supine: 140 degrees)   ER at 0 abd: 45 degrees    ER at 45 abd: 40 degrees; empty   ER at 90 abd: 20 degrees; firm and painful    Leobardo received therapeutic exercises to develop strength, endurance, ROM, flexibility and posture for 40 minutes including:    Assessment above  UBE 3 min Fwd/3min Bwd Level 2   Seated Inf glide stretch with 4# weight x 3 minutes  Supine AAROM with hands clasped x 20; 5s holds   SL [employer] [place] with dowel 2 x 10, 3s holds   SL ER with towel 3 x 10; 3s eccentric   Prone Scap retraction with arm resting at 90 flexion; 3 x 8     ER walkout RTB 3 x 10; cueing for neutral wrist  IR walkout GTB 3 x 10; cueing for neutral wrist  ER Wall Isometrics at 60 degrees elevation x 20; 3s holds   Education - HEP / no lifting more than 1lb (coffee cup)    Not today:   R SL Rotator Interval Stretch x  4 minutes      Leobardo received the following manual therapy techniques: Joint mobilizations were applied to the: (L) shoulder for 13 minutes, including:    Posterior glide; grade 4  Inferior glide at 90 abd; grade 4  SL MWM with scapular downward rotation into flexion  ER MWM at 90 abd   Contract relax at 90 abd to improve ER 5 x 8s     Home Exercises Provided and Patient Education Provided     Education provided:   - HEP  - no lifting heavier than a coffee cup    Written Home Exercises Provided: yes.  Exercises were reviewed and Leobardo was able to demonstrate them prior to the end of the session.  Leobardo demonstrated good  understanding of the education provided.     See EMR under Patient Instructions for exercises provided 1/19/2024.    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 Will continue to progress as able.      Leobardo Is progressing well towards her goals.   Pt prognosis is Good.     Pt will continue to benefit from skilled outpatient physical therapy to address the deficits listed in the problem list box on initial evaluation, provide pt/family education and to maximize pt's level of independence in the home and community environment.     Pt's spiritual, cultural and educational needs considered and pt agreeable to plan of care and goals.     Anticipated barriers to physical therapy: none       GOALS:   Short Term Goals:  4 weeks  1.Report decreased shoulder pain < / =  5/10  to increase tolerance for work - MET  2. Increase AROM to 90 flexion and 30 ER at 0 abd - MET  3. Increased strength by 1/3 MMT grade in shoulder strengthe to increase tolerance for ADL and work activities.- Progressing NOT MET  4. Pt to tolerate HEP to improve ROM and independence with ADL's-  MET     Long Term Goals: 12 weeks  1.Report decreased shoulder pain  < / =  2 /10  to increase tolerance  for work- -Progressing NOT MET  2.Increase AROM to full pain free-Progressing NOT MET  3.Increase strength to >/= 4/5 in shoulder strength to increase tolerance for ADL and work activities.-Progressing NOT MET  4. Pt goal: return to gym activities pain free -Progressing NOT MET     Plan   Plan of care Certification: 1/12/2024 to 5/10/2024.    Idalia Talbert, PTA

## 2024-04-22 ENCOUNTER — CLINICAL SUPPORT (OUTPATIENT)
Dept: REHABILITATION | Facility: HOSPITAL | Age: 59
End: 2024-04-22
Payer: MEDICAID

## 2024-04-22 DIAGNOSIS — M25.619 DECREASED RANGE OF MOTION OF SHOULDER, UNSPECIFIED LATERALITY: Primary | ICD-10-CM

## 2024-04-22 PROCEDURE — 97110 THERAPEUTIC EXERCISES: CPT | Mod: PN,CQ

## 2024-04-22 NOTE — PROGRESS NOTES
Physical Therapy Treatment Note     Name: Leobardo Rueda  Clinic Number: [id number]    Therapy Diagnosis:   Encounter Diagnosis   Name Primary?    Decreased range of motion of shoulder, unspecified laterality Yes       Physician: Austen Magaña,*    Visit Date: 4/22/2024    Physician Orders: PT Eval and Treat   Medical Diagnosis from Referral: S42.292D (ICD-10-CM) - Closed 3-part fracture of proximal humerus with routine healing, left   Evaluation Date: 1/12/2024  Authorization Period Expiration: 12/31/2024  Plan of Care Expiration: 5/10/2024  Visit # / Visits authorized: 22/20     Time In: 8:00 am  Time Out: 8:53 am  Total Appointment Time (timed & untimed codes): 53 minutes  Physical Therapy technician assisted with treatment under direct supervision of treating therapist as needed.     Precautions: Standard    Subjective     Pt reports: She is doing well. Worried about her range of motion.     She was compliant with home exercise program.  Response to previous treatment: no issues   Functional change: ongoing    Pain: 1/10  Location: left shoulder      Objective       AROM Shoulder:   Flexion: 120 degrees standing (supine: 140 degrees)   ER at 0 abd: 45 degrees    ER at 45 abd: 40 degrees; empty   ER at 90 abd: 20 degrees; firm and painful    Leobardo received therapeutic exercises to develop strength, endurance, ROM, flexibility and posture for 40 minutes including:    Assessment above  UBE 3 min Fwd/3min Bwd Level 2   Seated Inf glide stretch with 4# weight x 3 minutes  Supine AAROM with hands clasped x 20; 5s holds   SL [employer] [place] with dowel 2 x 10, 3s holds   SL ER with towel 3 x 10; 3s eccentric   Prone Scap retraction with arm resting at 90 flexion; 3 x 8     ER walkout RTB 3 x 10; cueing for neutral wrist  IR walkout GTB 3 x 10; cueing for neutral wrist  ER Wall Isometrics at 60 degrees elevation x 20; 3s holds   Education - HEP / no lifting more than 1lb (coffee cup)    Not today:   R SL  Rotator Interval Stretch x 4 minutes      Leobardo received the following manual therapy techniques: Joint mobilizations were applied to the: (L) shoulder for 13 minutes, including:    Posterior glide; grade 4  Inferior glide at 90 abd; grade 4  SL MWM with scapular downward rotation into flexion  ER MWM at 90 abd   Contract relax at 90 abd to improve ER 5 x 8s     Home Exercises Provided and Patient Education Provided     Education provided:   - HEP  - no lifting heavier than a coffee cup    Written Home Exercises Provided: yes.  Exercises were reviewed and Leobardo was able to demonstrate them prior to the end of the session.  Leobardo demonstrated good  understanding of the education provided.     See EMR under Patient Instructions for exercises provided 1/19/2024.    Assessment     Leobardo is displayed improved shoulder extension this date. Still struggles with horizontal adduction. Incorporated posterior capsule stretch to improve this. She will continue to benefit from skilled Physical Therapy to improve shoulder range of motion to allow her to perform personal hygiene such as washing contralateral shoulder without pain or limitations.     Leobardo Is progressing well towards her goals.   Pt prognosis is Good.     Pt will continue to benefit from skilled outpatient physical therapy to address the deficits listed in the problem list box on initial evaluation, provide pt/family education and to maximize pt's level of independence in the home and community environment.     Pt's spiritual, cultural and educational needs considered and pt agreeable to plan of care and goals.     Anticipated barriers to physical therapy: none       GOALS:   Short Term Goals:  4 weeks  1.Report decreased shoulder pain < / =  5/10  to increase tolerance for work - MET  2. Increase AROM to 90 flexion and 30 ER at 0 abd - MET  3. Increased strength by 1/3 MMT grade in shoulder strengthe to increase tolerance for ADL and work activities.-  Progressing NOT MET  4. Pt to tolerate HEP to improve ROM and independence with ADL's-  MET     Long Term Goals: 12 weeks  1.Report decreased shoulder pain  < / =  2 /10  to increase tolerance for work- -Progressing NOT MET  2.Increase AROM to full pain free-Progressing NOT MET  3.Increase strength to >/= 4/5 in shoulder strength to increase tolerance for ADL and work activities.-Progressing NOT MET  4. Pt goal: return to gym activities pain free -Progressing NOT MET     Plan   Plan of care Certification: 1/12/2024 to 5/10/2024.    Idalia Talbert, PTA

## 2024-04-24 ENCOUNTER — CLINICAL SUPPORT (OUTPATIENT)
Dept: REHABILITATION | Facility: HOSPITAL | Age: 59
End: 2024-04-24
Payer: MEDICAID

## 2024-04-24 DIAGNOSIS — M25.619 DECREASED RANGE OF MOTION OF SHOULDER, UNSPECIFIED LATERALITY: Primary | ICD-10-CM

## 2024-04-24 PROCEDURE — 97110 THERAPEUTIC EXERCISES: CPT | Mod: PN,CQ

## 2024-04-24 NOTE — PROGRESS NOTES
Physical Therapy Treatment Note     Name: Leobardo Rueda  Clinic Number: [id number]    Therapy Diagnosis:   Encounter Diagnosis   Name Primary?    Decreased range of motion of shoulder, unspecified laterality Yes       Physician: Austen Magaña,*    Visit Date: 4/24/2024    Physician Orders: PT Eval and Treat   Medical Diagnosis from Referral: S42.292D (ICD-10-CM) - Closed 3-part fracture of proximal humerus with routine healing, left   Evaluation Date: 1/12/2024  Authorization Period Expiration: 12/31/2024  Plan of Care Expiration: 5/10/2024  Visit # / Visits authorized: 23/40     Time In: 8:00 am  Time Out: 8:53 am  Total Appointment Time (timed & untimed codes): 53 minutes    Precautions: Standard    Subjective     Pt reports: She is excited about finally seeing some improvement in her shoulder range of motion.     She was compliant with home exercise program.  Response to previous treatment: no issues   Functional change: ongoing    Pain: 1/10  Location: left shoulder      Objective       AROM Shoulder:   Flexion: 120 degrees standing (supine: 140 degrees)   ER at 0 abd: 45 degrees    ER at 45 abd: 40 degrees; empty   ER at 90 abd: 20 degrees; firm and painful    Leobardo received therapeutic exercises to develop strength, endurance, ROM, flexibility and posture for 40 minutes including:    Assessment above  UBE 3 min Fwd/3min Bwd Level 2   Seated Inf glide stretch with 4# weight x 3 minutes  Supine AAROM with hands clasped x 20; 5s holds   SL [employer] [place] with dowel 2 x 10, 3s holds   SL ER with towel 3 x 10; 3s eccentric   Prone Scap retraction with arm resting at 90 flexion; 3 x 8     ER walkout RTB 3 x 10; cueing for neutral wrist  IR walkout GTB 3 x 10; cueing for neutral wrist  ER Wall Isometrics at 60 degrees elevation x 20; 3s holds   Education - HEP / no lifting more than 1lb (coffee cup)    Not today:   R SL Rotator Interval Stretch x 4 minutes      Leobardo received the following manual  therapy techniques: Joint mobilizations were applied to the: (L) shoulder for 13 minutes, including:    Posterior glide; grade 4  Inferior glide at 90 abd; grade 4  SL MWM with scapular downward rotation into flexion  ER MWM at 90 abd   Contract relax at 90 abd to improve ER 5 x 8s     Home Exercises Provided and Patient Education Provided     Education provided:   - HEP  - no lifting heavier than a coffee cup    Written Home Exercises Provided: yes.  Exercises were reviewed and Leobardo was able to demonstrate them prior to the end of the session.  Leobardo demonstrated good  understanding of the education provided.     See EMR under Patient Instructions for exercises provided 1/19/2024.    Assessment     Leobardo flexion is slowly improving but presents with positive shrug sign. Her endurance appear to be improving as well as she isn't having to take as many rest breaks as previously. Leobardo will continue to benefit from skilled Physical Therapy to improve remaining range of motion and strength deficits as able.     Leobardo Is progressing well towards her goals.   Pt prognosis is Good.     Pt will continue to benefit from skilled outpatient physical therapy to address the deficits listed in the problem list box on initial evaluation, provide pt/family education and to maximize pt's level of independence in the home and community environment.     Pt's spiritual, cultural and educational needs considered and pt agreeable to plan of care and goals.     Anticipated barriers to physical therapy: none       GOALS:   Short Term Goals:  4 weeks  1.Report decreased shoulder pain < / =  5/10  to increase tolerance for work - MET  2. Increase AROM to 90 flexion and 30 ER at 0 abd - MET  3. Increased strength by 1/3 MMT grade in shoulder strengthe to increase tolerance for ADL and work activities.- Progressing NOT MET  4. Pt to tolerate HEP to improve ROM and independence with ADL's-  MET     Long Term Goals: 12 weeks  1.Report  decreased shoulder pain  < / =  2 /10  to increase tolerance for work- -Progressing NOT MET  2.Increase AROM to full pain free-Progressing NOT MET  3.Increase strength to >/= 4/5 in shoulder strength to increase tolerance for ADL and work activities.-Progressing NOT MET  4. Pt goal: return to gym activities pain free -Progressing NOT MET     Plan   Plan of care Certification: 1/12/2024 to 5/10/2024.    Idalia Talbert, PTA

## 2024-04-25 ENCOUNTER — OFFICE VISIT (OUTPATIENT)
Dept: ORTHOPEDICS | Facility: CLINIC | Age: 59
End: 2024-04-25
Payer: MEDICAID

## 2024-04-25 VITALS — BODY MASS INDEX: 24.83 KG/M2 | WEIGHT: 134.94 LBS | HEIGHT: 62 IN

## 2024-04-25 DIAGNOSIS — M25.512 LEFT SHOULDER PAIN, UNSPECIFIED CHRONICITY: Primary | ICD-10-CM

## 2024-04-25 DIAGNOSIS — S42.292D CLOSED 3-PART FRACTURE OF PROXIMAL HUMERUS WITH ROUTINE HEALING, LEFT: ICD-10-CM

## 2024-04-25 DIAGNOSIS — M25.612 STIFFNESS OF SHOULDER JOINT, LEFT: ICD-10-CM

## 2024-04-25 PROCEDURE — 3008F BODY MASS INDEX DOCD: CPT | Mod: CPTII,,, | Performed by: ORTHOPAEDIC SURGERY

## 2024-04-25 PROCEDURE — 1160F RVW MEDS BY RX/DR IN RCRD: CPT | Mod: CPTII,,, | Performed by: ORTHOPAEDIC SURGERY

## 2024-04-25 PROCEDURE — 99999 PR PBB SHADOW E&M-EST. PATIENT-LVL II: CPT | Mod: PBBFAC,,, | Performed by: ORTHOPAEDIC SURGERY

## 2024-04-25 PROCEDURE — 99212 OFFICE O/P EST SF 10 MIN: CPT | Mod: PBBFAC,PO | Performed by: ORTHOPAEDIC SURGERY

## 2024-04-25 PROCEDURE — 99214 OFFICE O/P EST MOD 30 MIN: CPT | Mod: S$PBB,,, | Performed by: ORTHOPAEDIC SURGERY

## 2024-04-25 PROCEDURE — 1159F MED LIST DOCD IN RCRD: CPT | Mod: CPTII,,, | Performed by: ORTHOPAEDIC SURGERY

## 2024-04-25 NOTE — PROGRESS NOTES
Past Medical History:   Diagnosis Date    Anxiety     Hypothyroidism        Past Surgical History:   Procedure Laterality Date    AUGMENTATION OF BREAST      breast augmentation  10 years ago     SECTION      hemmorhiodectomy      tummy tuck      10 years ago       Current Outpatient Medications   Medication Sig Dispense Refill    ALPRAZolam (XANAX) 0.5 MG tablet Take 1 tablet (0.5 mg total) by mouth nightly as needed for Anxiety. 30 tablet 1    EScitalopram oxalate (LEXAPRO) 10 MG tablet Take 1 tablet (10 mg total) by mouth once daily. 90 tablet 1    fluticasone propionate (FLONASE) 50 mcg/actuation nasal spray 1 spray (50 mcg total) by Each Nostril route once daily. 15.8 mL 0    HYDROcodone-acetaminophen (NORCO) 5-325 mg per tablet Take 1 tablet by mouth every 4 (four) hours as needed for Pain. 12 tablet 0    HYDROcodone-acetaminophen (NORCO) 5-325 mg per tablet Take 1 tablet by mouth every 6 (six) hours as needed for Pain. 28 tablet 0    SYNTHROID 75 mcg tablet Take 75 mcg by mouth before breakfast.      valACYclovir (VALTREX) 1000 MG tablet Take 2,000 mg by mouth 2 (two) times daily.      VITAMIN D3 25 mcg (1,000 unit) tablet Take 1,000 Units by mouth once daily.       Current Facility-Administered Medications   Medication Dose Route Frequency Provider Last Rate Last Admin    testosterone cypionate injection 76 mg  76 mg Intramuscular Q28 Days Idalia Vo MD   76 mg at 24 0910       Review of patient's allergies indicates:  No Known Allergies    Family History   Problem Relation Name Age of Onset    Diabetes Mother      COPD Mother      Anxiety disorder Son      Colon cancer Paternal Grandfather         Social History     Socioeconomic History    Marital status:    Tobacco Use    Smoking status: Never    Smokeless tobacco: Never   Substance and Sexual Activity    Alcohol use: No    Drug use: No    Sexual activity: Yes     Partners: Male     Birth control/protection: None      Social Determinants of Health     Food Insecurity: No Food Insecurity (6/14/2022)    Hunger Vital Sign     Worried About Running Out of Food in the Last Year: Never true     Ran Out of Food in the Last Year: Never true   Transportation Needs: No Transportation Needs (6/14/2022)    PRAPARE - Transportation     Lack of Transportation (Medical): No     Lack of Transportation (Non-Medical): No   Physical Activity: Sufficiently Active (6/14/2022)    Exercise Vital Sign     Days of Exercise per Week: 5 days     Minutes of Exercise per Session: 30 min   Stress: Stress Concern Present (6/14/2022)    [employer] [employer] - Occupational Stress Questionnaire     Feeling of Stress : To some extent   Social Connections: Unknown (6/14/2022)    Social Connection and Isolation Panel [NHANES]     Frequency of Communication with Friends and Family: More than three times a week     Frequency of Social Gatherings with Friends and Family: More than three times a week     Active Member of Clubs or Organizations: Yes     Attends Club or Organization Meetings: More than 4 times per year     Marital Status:    Housing Stability: High Risk (6/14/2022)    Housing Stability Vital Sign     Unable to Pay for Housing in the Last Year: Yes     Unstable Housing in the Last Year: No       Chief Complaint:   Chief Complaint   Patient presents with    Left Shoulder - Pain       History of present illness:  59-year-old female seen for emergency room follow-up after a fall on December 16th where she injured her left shoulder.  Patient tripped over a fan and landed onto her left shoulder.  Pain is 3/10.  Doing the physical therapy.  Making making some progress but not very rapidly.  Most of her pain is particularly with abduction.  MRI did not show a rotator cuff tear anything else that would necessarily change course.      Review of Systems:  Musculoskeletal:  See HPI      Physical Examination:    Vital Signs:    There  were no vitals filed for this visit.        Body mass index is 24.68 kg/m².    This a well-developed, well nourished patient in no acute distress.  They are alert and oriented and cooperative to examination.  Pt. walks without an antalgic gait.      Examination left shoulder shows mild swelling.  Patient is able to wiggle her fingers and thumb.  Brisk capillary refill and intact light touch sensation in all digits.  Forward flexion of about 105° with external rotation of 70°.  Seems to be about the same as it was at her last visit.    X-rays:  X-rays left shoulder are review which show a 3 part proximal humerus fracture in acceptable alignment with minimal displacement but no significant change from previous.  Continued callus formation noted.    MRI of the left shoulder is reviewed and interpreted:1. Subacute fracture of the proximal humerus which is mildly comminuted and displaced.  2. Mild degenerative change of the AC joint with associated rotator outlet narrowing.  3. Mild insertional tendinosis of supraspinatus.     Assessment::  Left 3 part proximal humerus fracture  Left shoulder stiffness    Plan:  Reviewed the findings with her today.  Continue the physical therapy.  If she is not improving then  we probably will need to go in there and do an arthroscopic lysis of adhesion as well as a manipulation.  Follow up in 2 months.    All previous pertinent notes including ER visits, physical therapy visits, other orthopedic visits as well as other care for the same musculoskeletal problem were reviewed.  All pertinent lab values and previous imaging was reviewed pertinent to the current visit.    This note was created using [employer] voice recognition software that occasionally misinterpreted phrases or words.    Consult note is delivered via Epic messaging service.

## 2024-04-29 ENCOUNTER — CLINICAL SUPPORT (OUTPATIENT)
Dept: REHABILITATION | Facility: HOSPITAL | Age: 59
End: 2024-04-29
Payer: MEDICAID

## 2024-04-29 DIAGNOSIS — M25.619 DECREASED RANGE OF MOTION OF SHOULDER, UNSPECIFIED LATERALITY: Primary | ICD-10-CM

## 2024-04-29 PROCEDURE — 97110 THERAPEUTIC EXERCISES: CPT | Mod: PN,CQ

## 2024-04-29 NOTE — PROGRESS NOTES
Physical Therapy Treatment Note     Name: Leobardo Rueda  Clinic Number: [id number]    Therapy Diagnosis:   Encounter Diagnosis   Name Primary?    Decreased range of motion of shoulder, unspecified laterality Yes       Physician: Austen Magaña,*    Visit Date: 4/29/2024    Physician Orders: PT Eval and Treat   Medical Diagnosis from Referral: S42.292D (ICD-10-CM) - Closed 3-part fracture of proximal humerus with routine healing, left   Evaluation Date: 1/12/2024  Authorization Period Expiration: 12/31/2024  Plan of Care Expiration: 5/10/2024  Visit # / Visits authorized: 25/40     Time In: 8:00 am  Time Out: 8:53 am  Total Appointment Time (timed & untimed codes): 53 minutes    Precautions: Standard    Subjective     Pt reports: Doctor is pleased with her progress thus far.     She was compliant with home exercise program.  Response to previous treatment: no issues   Functional change: ongoing    Pain: 1/10  Location: left shoulder      Objective       AROM Shoulder:   Flexion: 120 degrees standing (supine: 140 degrees)   ER at 0 abd: 45 degrees    ER at 45 abd: 40 degrees; empty   ER at 90 abd: 20 degrees; firm and painful    Leobardo received therapeutic exercises to develop strength, endurance, ROM, flexibility and posture for 40 minutes including:    Assessment above  UBE 3 min Fwd/3min Bwd Level 2   Seated Inf glide stretch with 4# weight x 3 minutes  Supine AAROM with hands clasped x 20; 5s holds   SL S[place] with dowel 2 x 10, 3s holds   SL ER with towel 3 x 10; 3s eccentric   Prone Scap retraction with arm resting at 90 flexion; 3 x 8     ER walkout RTB 3 x 10; cueing for neutral wrist  IR walkout GTB 3 x 10; cueing for neutral wrist  ER Wall Isometrics at 80 degrees elevation x 20; 3s holds   Adduction isometric at 80 degrees elevation x 20; 3s holds   Education - HEP / no lifting more than 1lb (coffee cup)    Not today:   R SL Rotator Interval Stretch x 4 minutes      Leobardo received the  following manual therapy techniques: Joint mobilizations were applied to the: (L) shoulder for 13 minutes, including:    Posterior glide; grade 4  Inferior glide at 90 abd; grade 4  SL MWM with scapular downward rotation into flexion  ER MWM at 90 abd   Contract relax at 90 abd to improve ER 5 x 8s     Home Exercises Provided and Patient Education Provided     Education provided:   - HEP  - no lifting heavier than a coffee cup    Written Home Exercises Provided: yes.  Exercises were reviewed and Leobardo was able to demonstrate them prior to the end of the session.  Leobardo demonstrated good  understanding of the education provided.     See EMR under Patient Instructions for exercises provided 1/19/2024.    Assessment     Leobardo was able to incorporate adduction/IR isometrics at 80 degrees elevation to improve contralateral hygiene. She struggles maintain 80 degrees of elevation but is able to self correct and complete. Will progress as able.     Leobardo Is progressing well towards her goals.   Pt prognosis is Good.     Pt will continue to benefit from skilled outpatient physical therapy to address the deficits listed in the problem list box on initial evaluation, provide pt/family education and to maximize pt's level of independence in the home and community environment.     Pt's spiritual, cultural and educational needs considered and pt agreeable to plan of care and goals.     Anticipated barriers to physical therapy: none       GOALS:   Short Term Goals:  4 weeks  1.Report decreased shoulder pain < / =  5/10  to increase tolerance for work - MET  2. Increase AROM to 90 flexion and 30 ER at 0 abd - MET  3. Increased strength by 1/3 MMT grade in shoulder strengthe to increase tolerance for ADL and work activities.- Progressing NOT MET  4. Pt to tolerate HEP to improve ROM and independence with ADL's-  MET     Long Term Goals: 12 weeks  1.Report decreased shoulder pain  < / =  2 /10  to increase tolerance for work-  -Progressing NOT MET  2.Increase AROM to full pain free-Progressing NOT MET  3.Increase strength to >/= 4/5 in shoulder strength to increase tolerance for ADL and work activities.-Progressing NOT MET  4. Pt goal: return to gym activities pain free -Progressing NOT MET     Plan   Plan of care Certification: 1/12/2024 to 5/10/2024.    Idalia Talbert, PTA

## 2024-05-09 ENCOUNTER — CLINICAL SUPPORT (OUTPATIENT)
Dept: OBSTETRICS AND GYNECOLOGY | Facility: CLINIC | Age: 59
End: 2024-05-09

## 2024-05-09 DIAGNOSIS — N95.1 SYMPTOMATIC MENOPAUSAL OR FEMALE CLIMACTERIC STATES: Primary | ICD-10-CM

## 2024-05-09 PROCEDURE — 99999PBSHW PR PBB SHADOW TECHNICAL ONLY FILED TO HB: Mod: PBBFAC,,,

## 2024-05-09 PROCEDURE — 96372 THER/PROPH/DIAG INJ SC/IM: CPT | Mod: PBBFAC

## 2024-05-09 PROCEDURE — 99999 PR PBB SHADOW E&M-EST. PATIENT-LVL I: CPT | Mod: PBBFAC,,,

## 2024-05-09 RX ADMIN — TESTOSTERONE CYPIONATE 76 MG: 200 INJECTION, SOLUTION INTRAMUSCULAR at 01:05

## 2024-05-09 NOTE — PROGRESS NOTES
Here for hormone therapy injection, no complaints at this time, Injection given as ordered, tolerated well, no report of pain prior to or after injection. Return to clinic as scheduled.     Site - RB    Testosterone  76 mg    Clinic Supplied Medication

## 2024-05-15 ENCOUNTER — PATIENT MESSAGE (OUTPATIENT)
Dept: OBSTETRICS AND GYNECOLOGY | Facility: CLINIC | Age: 59
End: 2024-05-15

## 2024-05-15 RX ORDER — NITROFURANTOIN 25; 75 MG/1; MG/1
100 CAPSULE ORAL 2 TIMES DAILY
Qty: 14 CAPSULE | Refills: 0 | Status: SHIPPED | OUTPATIENT
Start: 2024-05-15 | End: 2024-05-22

## 2024-05-23 ENCOUNTER — LAB VISIT (OUTPATIENT)
Dept: LAB | Facility: HOSPITAL | Age: 59
End: 2024-05-23
Attending: OBSTETRICS & GYNECOLOGY

## 2024-05-23 DIAGNOSIS — N95.1 SYMPTOMATIC MENOPAUSAL OR FEMALE CLIMACTERIC STATES: ICD-10-CM

## 2024-05-23 LAB — ESTRADIOL SERPL-MCNC: <10 PG/ML

## 2024-05-23 PROCEDURE — 82670 ASSAY OF TOTAL ESTRADIOL: CPT | Performed by: OBSTETRICS & GYNECOLOGY

## 2024-05-23 PROCEDURE — 84402 ASSAY OF FREE TESTOSTERONE: CPT | Performed by: OBSTETRICS & GYNECOLOGY

## 2024-05-23 PROCEDURE — 36415 COLL VENOUS BLD VENIPUNCTURE: CPT | Mod: PO | Performed by: OBSTETRICS & GYNECOLOGY

## 2024-05-28 LAB — TESTOST FREE SERPL-MCNC: 2.3 PG/ML

## 2024-06-07 ENCOUNTER — PATIENT OUTREACH (OUTPATIENT)
Dept: ADMINISTRATIVE | Facility: HOSPITAL | Age: 59
End: 2024-06-07

## 2024-06-07 ENCOUNTER — LAB VISIT (OUTPATIENT)
Dept: LAB | Facility: HOSPITAL | Age: 59
End: 2024-06-07
Attending: INTERNAL MEDICINE

## 2024-06-07 DIAGNOSIS — E04.1 CYST OF THYROID: ICD-10-CM

## 2024-06-07 DIAGNOSIS — E06.3 CHRONIC LYMPHOCYTIC THYROIDITIS: ICD-10-CM

## 2024-06-07 DIAGNOSIS — Z79.899 ENCOUNTER FOR LONG-TERM (CURRENT) USE OF OTHER MEDICATIONS: ICD-10-CM

## 2024-06-07 DIAGNOSIS — I51.9 MYXEDEMA HEART DISEASE: Primary | ICD-10-CM

## 2024-06-07 DIAGNOSIS — E03.9 MYXEDEMA HEART DISEASE: Primary | ICD-10-CM

## 2024-06-07 LAB
25(OH)D3+25(OH)D2 SERPL-MCNC: 59 NG/ML (ref 30–96)
T4 FREE SERPL-MCNC: 0.96 NG/DL (ref 0.71–1.51)
TSH SERPL DL<=0.005 MIU/L-ACNC: 0.95 UIU/ML (ref 0.4–4)

## 2024-06-07 PROCEDURE — 84439 ASSAY OF FREE THYROXINE: CPT | Performed by: INTERNAL MEDICINE

## 2024-06-07 PROCEDURE — 82306 VITAMIN D 25 HYDROXY: CPT | Performed by: INTERNAL MEDICINE

## 2024-06-07 PROCEDURE — 36415 COLL VENOUS BLD VENIPUNCTURE: CPT | Mod: PO | Performed by: INTERNAL MEDICINE

## 2024-06-07 PROCEDURE — 84443 ASSAY THYROID STIM HORMONE: CPT | Performed by: INTERNAL MEDICINE

## 2024-06-07 NOTE — PROGRESS NOTES
Population Health Chart Review & Patient Outreach Details      Additional [facility] Health Notes:               Updates Requested / Reviewed:      Updated Care Coordination Note, Care Everywhere, and          Health Maintenance Topics Overdue:      VB Score: 2     Colon Cancer Screening  Mammogram    Tetanus Vaccine  Shingles/Zoster Vaccine  RSV Vaccine                  Health Maintenance Topic(s) Outreach Outcomes & Actions Taken:    Breast Cancer Screening - Outreach Outcomes & Actions Taken  : MSG    Colorectal Cancer Screening - Outreach Outcomes & Actions Taken  : MSG

## 2024-06-07 NOTE — LETTER
June 20, 2024    Leobardo Rueda  [address] [place] [address]             [facility]  [address]  [facility] [address]  Phone: [phone] Dear Leobardo:        Our records indicate that you are due for a  Colon Cancer Screening.  Clint Mejía MD would like to offer you a FIT KIT which is a simple test that could be done at home and returned to our lab for processing.  The Fit Kit colon cancer screening takes just minutes and is a simple and effective method for detecting signs of colon cancer.  This home test should be done yearly.       Please let our office know if you would like a FIT KIT mailed to your home or would like to proceed with a Colonoscopy instead.  If you have an upcoming visit with your Primary Care Physician,  you can  a Fit Kit during your visit.          Regular colorectal cancer screening is one of the most powerful weapons for preventing Colon Cancer.   If you have had an Colonoscopy within the last 10 years, please let us know so we can update your [facility] medical record.            Clint Mejía MD and your [facility] Primary Care Team  Phone: [phone]  FAX: [phone]

## 2024-06-07 NOTE — LETTER
June 20, 2024    Leobardo Rueda  [address] [place] [address]             [facility]  [address]  [facility] [address]  Phone: [phone] Dear Kym Booth Craig P, MD has entered your screening mammogram order.  You can schedule this Mammogram exam through the link in your [facility] portal, provided on the Appointment Center home page.  Should you need assistance with scheduling, you can call the main line at [phone]. Our scheduling specialists will be able to help you coordinate your appointment.    If you recently had your mammogram screening outside of [facility], please let your primary care team know so that they can update your health record.         Sincerely,        Your Women's Health Care Team     Phone#  [phone]  FAX#  [phone]

## 2024-06-10 DIAGNOSIS — M85.89 OTHER SPECIFIED DISORDERS OF BONE DENSITY AND STRUCTURE, MULTIPLE SITES: ICD-10-CM

## 2024-06-10 DIAGNOSIS — E04.1 NONTOXIC UNINODULAR GOITER: Primary | ICD-10-CM

## 2024-06-13 ENCOUNTER — CLINICAL SUPPORT (OUTPATIENT)
Dept: OBSTETRICS AND GYNECOLOGY | Facility: CLINIC | Age: 59
End: 2024-06-13

## 2024-06-13 DIAGNOSIS — N95.1 SYMPTOMATIC MENOPAUSAL OR FEMALE CLIMACTERIC STATES: Primary | ICD-10-CM

## 2024-06-13 PROCEDURE — 99999PBSHW PR PBB SHADOW TECHNICAL ONLY FILED TO HB: Mod: PBBFAC,,,

## 2024-06-13 PROCEDURE — 99999 PR PBB SHADOW E&M-EST. PATIENT-LVL I: CPT | Mod: PBBFAC,,,

## 2024-06-13 PROCEDURE — 96372 THER/PROPH/DIAG INJ SC/IM: CPT | Mod: PBBFAC

## 2024-06-13 RX ADMIN — TESTOSTERONE CYPIONATE 76 MG: 200 INJECTION, SOLUTION INTRAMUSCULAR at 11:06

## 2024-06-20 DIAGNOSIS — F41.9 ANXIETY: ICD-10-CM

## 2024-06-20 RX ORDER — ESCITALOPRAM OXALATE 10 MG/1
10 TABLET ORAL DAILY
Qty: 90 TABLET | Refills: 0 | Status: SHIPPED | OUTPATIENT
Start: 2024-06-20 | End: 2025-09-13

## 2024-06-20 NOTE — TELEPHONE ENCOUNTER
Jay Kaplan [place] Staff     ----- Message from Jay Kaplan sent at 6/20/2024  5:00 PM CDT -----  Type:  RX Refill Request    Who Called: Pt  Refill or New Rx:refill  RX Name and Strength:EScitalopram oxalate (LEXAPRO) 10 MG tablet  How is the patient currently taking it? (ex. 1XDay):1xday  Is this a 30 day or 90 day RX:90  Preferred Pharmacy with phone number:[id number] - [place] - [address] [facility] [address]   Phone: [phone]  Fax: [phone]  Local or Mail Order:local  Ordering Provider:Kym  Would the patient rather a call back or a response via [facility]? Call back  Best Call Back Number:[phone]   Additional Information: pt requesting a refill. She is down to 5 pills. Need as soon as possible.

## 2024-06-20 NOTE — TELEPHONE ENCOUNTER
Refill Decision Note   Leobardo Rueda  is requesting a refill authorization.  Brief Assessment and Rationale for Refill:  Approve     Medication Therapy Plan:  FOVS; Reviewed acute care/admission visit notes; No follow up with PCP recommended by acute care provider; Approved per protocol      Extended chart review required: Yes   Comments:     Note composed:5:25 PM 06/20/2024

## 2024-06-20 NOTE — TELEPHONE ENCOUNTER
Care Due:                  Date            Visit Type   Department     Provider  --------------------------------------------------------------------------------                                EP -                              PRIMARY      [facility] FAMILY    Clint Webber  Last Visit: 06-      CARE ([facility])   MEDICINE       Kym  Next Visit: None Scheduled  None         None Found                                                            Last  Test          Frequency    Reason                     Performed    Due Date  --------------------------------------------------------------------------------    Office Visit  15 months..  EScitalopram.............  06- 09-    [facility] Embedded Care Due Messages. Reference number: [id number].   6/20/2024 5:07:00 PM CDT

## 2024-07-11 ENCOUNTER — CLINICAL SUPPORT (OUTPATIENT)
Dept: OBSTETRICS AND GYNECOLOGY | Facility: CLINIC | Age: 59
End: 2024-07-11

## 2024-07-11 DIAGNOSIS — N95.1 SYMPTOMATIC MENOPAUSAL OR FEMALE CLIMACTERIC STATES: Primary | ICD-10-CM

## 2024-07-11 PROCEDURE — 96372 THER/PROPH/DIAG INJ SC/IM: CPT | Mod: PBBFAC

## 2024-07-11 PROCEDURE — 99999PBSHW PR PBB SHADOW TECHNICAL ONLY FILED TO HB: Mod: PBBFAC,,,

## 2024-07-11 PROCEDURE — 99999 PR PBB SHADOW E&M-EST. PATIENT-LVL I: CPT | Mod: PBBFAC,,,

## 2024-07-11 RX ORDER — TESTOSTERONE CYPIONATE 200 MG/ML
76 INJECTION, SOLUTION INTRAMUSCULAR
Status: SHIPPED | OUTPATIENT
Start: 2024-07-11 | End: 2024-10-31

## 2024-07-11 RX ADMIN — TESTOSTERONE CYPIONATE 76 MG: 200 INJECTION INTRAMUSCULAR at 10:07

## 2024-08-12 ENCOUNTER — CLINICAL SUPPORT (OUTPATIENT)
Dept: OBSTETRICS AND GYNECOLOGY | Facility: CLINIC | Age: 59
End: 2024-08-12

## 2024-08-12 DIAGNOSIS — N95.1 SYMPTOMATIC MENOPAUSAL OR FEMALE CLIMACTERIC STATES: Primary | ICD-10-CM

## 2024-08-12 PROCEDURE — 96372 THER/PROPH/DIAG INJ SC/IM: CPT | Mod: PBBFAC

## 2024-08-12 PROCEDURE — 99999 PR PBB SHADOW E&M-EST. PATIENT-LVL I: CPT | Mod: PBBFAC,,,

## 2024-08-12 PROCEDURE — 99999PBSHW PR PBB SHADOW TECHNICAL ONLY FILED TO HB: Mod: PBBFAC,,,

## 2024-08-12 RX ADMIN — TESTOSTERONE CYPIONATE 76 MG: 200 INJECTION INTRAMUSCULAR at 01:08

## 2024-08-12 NOTE — PROGRESS NOTES
Here for hormone therapy injection, no complaints at this time. Injection given as ordered, tolerated well no reports of pain prior to or post injection. Advised to return to clinic as scheduled      Site:[facility]     Testerone:76 mg        CLINIC SUPPLIED MEDICATION

## 2024-08-21 ENCOUNTER — LAB VISIT (OUTPATIENT)
Dept: LAB | Facility: HOSPITAL | Age: 59
End: 2024-08-21
Attending: STUDENT IN AN ORGANIZED HEALTH CARE EDUCATION/TRAINING PROGRAM

## 2024-08-21 ENCOUNTER — OFFICE VISIT (OUTPATIENT)
Dept: FAMILY MEDICINE | Facility: CLINIC | Age: 59
End: 2024-08-21

## 2024-08-21 VITALS
DIASTOLIC BLOOD PRESSURE: 76 MMHG | WEIGHT: 149.69 LBS | OXYGEN SATURATION: 96 % | HEART RATE: 72 BPM | HEIGHT: 62 IN | SYSTOLIC BLOOD PRESSURE: 122 MMHG | BODY MASS INDEX: 27.55 KG/M2

## 2024-08-21 DIAGNOSIS — Z12.11 ENCOUNTER FOR SCREENING FOR MALIGNANT NEOPLASM OF COLON: ICD-10-CM

## 2024-08-21 DIAGNOSIS — Z12.31 ENCOUNTER FOR SCREENING MAMMOGRAM FOR MALIGNANT NEOPLASM OF BREAST: ICD-10-CM

## 2024-08-21 DIAGNOSIS — Z00.00 ROUTINE GENERAL MEDICAL EXAMINATION AT A HEALTH CARE FACILITY: Primary | ICD-10-CM

## 2024-08-21 DIAGNOSIS — Z00.00 ROUTINE GENERAL MEDICAL EXAMINATION AT A HEALTH CARE FACILITY: ICD-10-CM

## 2024-08-21 LAB
ALBUMIN SERPL BCP-MCNC: 4 G/DL (ref 3.5–5.2)
ALP SERPL-CCNC: 41 U/L (ref 55–135)
ALT SERPL W/O P-5'-P-CCNC: 15 U/L (ref 10–44)
ANION GAP SERPL CALC-SCNC: 8 MMOL/L (ref 8–16)
AST SERPL-CCNC: 18 U/L (ref 10–40)
BASOPHILS # BLD AUTO: 0.02 K/UL (ref 0–0.2)
BASOPHILS NFR BLD: 0.4 % (ref 0–1.9)
BILIRUB SERPL-MCNC: 0.5 MG/DL (ref 0.1–1)
BUN SERPL-MCNC: 7 MG/DL (ref 6–20)
CALCIUM SERPL-MCNC: 9.5 MG/DL (ref 8.7–10.5)
CHLORIDE SERPL-SCNC: 106 MMOL/L (ref 95–110)
CHOLEST SERPL-MCNC: 231 MG/DL (ref 120–199)
CHOLEST/HDLC SERPL: 3.8 {RATIO} (ref 2–5)
CO2 SERPL-SCNC: 25 MMOL/L (ref 23–29)
CREAT SERPL-MCNC: 0.9 MG/DL (ref 0.5–1.4)
DIFFERENTIAL METHOD BLD: ABNORMAL
EOSINOPHIL # BLD AUTO: 0.2 K/UL (ref 0–0.5)
EOSINOPHIL NFR BLD: 2.8 % (ref 0–8)
ERYTHROCYTE [DISTWIDTH] IN BLOOD BY AUTOMATED COUNT: 12.9 % (ref 11.5–14.5)
EST. GFR  (NO RACE VARIABLE): >60 ML/MIN/1.73 M^2
GLUCOSE SERPL-MCNC: 77 MG/DL (ref 70–110)
HCT VFR BLD AUTO: 45.3 % (ref 37–48.5)
HDLC SERPL-MCNC: 61 MG/DL (ref 40–75)
HDLC SERPL: 26.4 % (ref 20–50)
HGB BLD-MCNC: 14.4 G/DL (ref 12–16)
IMM GRANULOCYTES # BLD AUTO: 0.01 K/UL (ref 0–0.04)
IMM GRANULOCYTES NFR BLD AUTO: 0.2 % (ref 0–0.5)
LDLC SERPL CALC-MCNC: 151.8 MG/DL (ref 63–159)
LYMPHOCYTES # BLD AUTO: 1.5 K/UL (ref 1–4.8)
LYMPHOCYTES NFR BLD: 29.2 % (ref 18–48)
MCH RBC QN AUTO: 29.6 PG (ref 27–31)
MCHC RBC AUTO-ENTMCNC: 31.8 G/DL (ref 32–36)
MCV RBC AUTO: 93 FL (ref 82–98)
MONOCYTES # BLD AUTO: 0.5 K/UL (ref 0.3–1)
MONOCYTES NFR BLD: 10.2 % (ref 4–15)
NEUTROPHILS # BLD AUTO: 3 K/UL (ref 1.8–7.7)
NEUTROPHILS NFR BLD: 57.2 % (ref 38–73)
NONHDLC SERPL-MCNC: 170 MG/DL
NRBC BLD-RTO: 0 /100 WBC
PLATELET # BLD AUTO: 232 K/UL (ref 150–450)
PMV BLD AUTO: 11.9 FL (ref 9.2–12.9)
POTASSIUM SERPL-SCNC: 4.9 MMOL/L (ref 3.5–5.1)
PROT SERPL-MCNC: 6.9 G/DL (ref 6–8.4)
RBC # BLD AUTO: 4.86 M/UL (ref 4–5.4)
SODIUM SERPL-SCNC: 139 MMOL/L (ref 136–145)
TRIGL SERPL-MCNC: 91 MG/DL (ref 30–150)
WBC # BLD AUTO: 5.28 K/UL (ref 3.9–12.7)

## 2024-08-21 PROCEDURE — 99396 PREV VISIT EST AGE 40-64: CPT | Mod: S$PBB,,, | Performed by: STUDENT IN AN ORGANIZED HEALTH CARE EDUCATION/TRAINING PROGRAM

## 2024-08-21 PROCEDURE — 99213 OFFICE O/P EST LOW 20 MIN: CPT | Mod: PBBFAC,PO | Performed by: STUDENT IN AN ORGANIZED HEALTH CARE EDUCATION/TRAINING PROGRAM

## 2024-08-21 PROCEDURE — 80053 COMPREHEN METABOLIC PANEL: CPT | Performed by: STUDENT IN AN ORGANIZED HEALTH CARE EDUCATION/TRAINING PROGRAM

## 2024-08-21 PROCEDURE — 36415 COLL VENOUS BLD VENIPUNCTURE: CPT | Mod: PO | Performed by: STUDENT IN AN ORGANIZED HEALTH CARE EDUCATION/TRAINING PROGRAM

## 2024-08-21 PROCEDURE — 99999 PR PBB SHADOW E&M-EST. PATIENT-LVL III: CPT | Mod: PBBFAC,,, | Performed by: STUDENT IN AN ORGANIZED HEALTH CARE EDUCATION/TRAINING PROGRAM

## 2024-08-21 PROCEDURE — 85025 COMPLETE CBC W/AUTO DIFF WBC: CPT | Performed by: STUDENT IN AN ORGANIZED HEALTH CARE EDUCATION/TRAINING PROGRAM

## 2024-08-21 PROCEDURE — 80061 LIPID PANEL: CPT | Performed by: STUDENT IN AN ORGANIZED HEALTH CARE EDUCATION/TRAINING PROGRAM

## 2024-08-21 NOTE — PROGRESS NOTES
"[facility] MEDICINE CLINIC NOTE    Patient Name: Leobardo Rueda  YOB: 1965    PRESENTING HISTORY     History of Present Illness:  Ms. Leobardo Rueda is a 59 y.o. female here for routine checkup.     No sig changes in her health.   Due for mammogram and Colon screening.     High anxiety at baseline.   Hihg stress at work.   High stress with family re to alcohol abuse in family members.   She is trying hard to keep her medicine usage to a minimum. Downtitrated her lexapro to 5mg. Takes prn xanax during acute stressors.   Using exercise as treatment.     FitKit was given to patient on 8/21/2024 9:29 AM     She is on testosterone therapy through GYN.     Concerned about some weight gain. Had shoulder fx last year which limited mobility.       ROS      OBJECTIVE:   Vital Signs:  Vitals:    08/21/24 0909   BP: 122/76   Pulse: 72   SpO2: 96%   Weight: 67.9 kg (149 lb 11.1 oz)   Height: 5' 2" (1.575 m)            Physical Exam  Vitals and nursing note reviewed.   Constitutional:       Appearance: She is not ill-appearing, toxic-appearing or diaphoretic.   HENT:      Head: Normocephalic and atraumatic.      Right Ear: External ear normal.      Left Ear: External ear normal.   Eyes:      General: No scleral icterus.     Conjunctiva/sclera: Conjunctivae normal.      Pupils: Pupils are equal, round, and reactive to light.   Neck:      Thyroid: No thyromegaly.   Cardiovascular:      Rate and Rhythm: Normal rate and regular rhythm.      Heart sounds: Normal heart sounds. No murmur heard.  Pulmonary:      Effort: Pulmonary effort is normal. No respiratory distress.      Breath sounds: Normal breath sounds. No wheezing or rales.   Musculoskeletal:         General: No tenderness or deformity. Normal range of motion.      Cervical back: Normal range of motion and neck supple.      Right lower leg: No edema.      Left lower leg: No edema.   Lymphadenopathy:      Cervical: No cervical adenopathy.   Skin:     " General: Skin is warm and dry.      Findings: No erythema or rash.   Neurological:      Mental Status: She is alert and oriented to person, place, and time.      Gait: Gait is intact.   Psychiatric:         Mood and Affect: Mood and affect normal.         Cognition and Memory: Memory normal.         Judgment: Judgment normal.         ASSESSMENT & PLAN:     Routine general medical examination at a health care facility  -     CBC W/ AUTO DIFFERENTIAL; Future; Expected date: 08/21/2024  -     COMPREHENSIVE METABOLIC PANEL; Future; Expected date: 08/21/2024  -     Lipid Panel; Future; Expected date: 11/21/2024    Encounter for screening mammogram for malignant neoplasm of breast  -     Mammo Digital Screening Bilat w/ Maikel; Future; Expected date: 08/21/2024    Encounter for screening for malignant neoplasm of colon  -     Fecal Immunochemical Test (iFOBT); Future; Expected date: 08/21/2024        Clint Mejía  Internal Medicine

## 2024-09-04 ENCOUNTER — LAB VISIT (OUTPATIENT)
Dept: LAB | Facility: HOSPITAL | Age: 59
End: 2024-09-04
Attending: STUDENT IN AN ORGANIZED HEALTH CARE EDUCATION/TRAINING PROGRAM

## 2024-09-04 DIAGNOSIS — Z12.11 ENCOUNTER FOR SCREENING FOR MALIGNANT NEOPLASM OF COLON: ICD-10-CM

## 2024-09-04 PROCEDURE — 82274 ASSAY TEST FOR BLOOD FECAL: CPT | Performed by: STUDENT IN AN ORGANIZED HEALTH CARE EDUCATION/TRAINING PROGRAM

## 2024-09-05 LAB — HEMOCCULT STL QL IA: NEGATIVE

## 2024-09-09 ENCOUNTER — CLINICAL SUPPORT (OUTPATIENT)
Dept: OBSTETRICS AND GYNECOLOGY | Facility: CLINIC | Age: 59
End: 2024-09-09

## 2024-09-09 DIAGNOSIS — N95.1 SYMPTOMATIC MENOPAUSAL OR FEMALE CLIMACTERIC STATES: Primary | ICD-10-CM

## 2024-09-09 PROCEDURE — 99999PBSHW PR PBB SHADOW TECHNICAL ONLY FILED TO HB: Mod: PBBFAC,,,

## 2024-09-09 PROCEDURE — 96372 THER/PROPH/DIAG INJ SC/IM: CPT | Mod: PBBFAC

## 2024-09-09 RX ADMIN — TESTOSTERONE CYPIONATE 76 MG: 200 INJECTION INTRAMUSCULAR at 01:09

## 2024-10-05 ENCOUNTER — CLINICAL SUPPORT (OUTPATIENT)
Dept: OBSTETRICS AND GYNECOLOGY | Facility: CLINIC | Age: 59
End: 2024-10-05

## 2024-10-05 DIAGNOSIS — N95.1 SYMPTOMATIC MENOPAUSAL OR FEMALE CLIMACTERIC STATES: Primary | ICD-10-CM

## 2024-10-05 PROCEDURE — 96372 THER/PROPH/DIAG INJ SC/IM: CPT | Mod: PBBFAC

## 2024-10-05 PROCEDURE — 99999PBSHW PR PBB SHADOW TECHNICAL ONLY FILED TO HB: Mod: PBBFAC,,,

## 2024-10-05 PROCEDURE — 99999 PR PBB SHADOW E&M-EST. PATIENT-LVL II: CPT | Mod: PBBFAC,,,

## 2024-10-05 RX ADMIN — TESTOSTERONE CYPIONATE 76 MG: 200 INJECTION INTRAMUSCULAR at 10:10

## 2024-10-31 ENCOUNTER — HOSPITAL ENCOUNTER (OUTPATIENT)
Dept: RADIOLOGY | Facility: CLINIC | Age: 59
Discharge: HOME | End: 2024-10-31
Attending: STUDENT IN AN ORGANIZED HEALTH CARE EDUCATION/TRAINING PROGRAM

## 2024-10-31 ENCOUNTER — OFFICE VISIT (OUTPATIENT)
Dept: FAMILY MEDICINE | Facility: CLINIC | Age: 59
End: 2024-10-31

## 2024-10-31 VITALS
SYSTOLIC BLOOD PRESSURE: 126 MMHG | DIASTOLIC BLOOD PRESSURE: 82 MMHG | WEIGHT: 148.38 LBS | HEIGHT: 62 IN | HEART RATE: 70 BPM | BODY MASS INDEX: 27.3 KG/M2 | OXYGEN SATURATION: 97 %

## 2024-10-31 DIAGNOSIS — R19.5 LOOSE STOOLS: ICD-10-CM

## 2024-10-31 DIAGNOSIS — R10.13 EPIGASTRIC PAIN: ICD-10-CM

## 2024-10-31 DIAGNOSIS — R19.5 LOOSE STOOLS: Primary | ICD-10-CM

## 2024-10-31 PROCEDURE — 74018 RADEX ABDOMEN 1 VIEW: CPT | Mod: TC,FY,PO

## 2024-10-31 PROCEDURE — 99999 PR PBB SHADOW E&M-EST. PATIENT-LVL IV: CPT | Mod: PBBFAC,,, | Performed by: STUDENT IN AN ORGANIZED HEALTH CARE EDUCATION/TRAINING PROGRAM

## 2024-10-31 PROCEDURE — 99213 OFFICE O/P EST LOW 20 MIN: CPT | Mod: S$PBB,,, | Performed by: STUDENT IN AN ORGANIZED HEALTH CARE EDUCATION/TRAINING PROGRAM

## 2024-10-31 PROCEDURE — 99214 OFFICE O/P EST MOD 30 MIN: CPT | Mod: PBBFAC,25,PO | Performed by: STUDENT IN AN ORGANIZED HEALTH CARE EDUCATION/TRAINING PROGRAM

## 2024-10-31 PROCEDURE — 74018 RADEX ABDOMEN 1 VIEW: CPT | Mod: 26,,, | Performed by: RADIOLOGY

## 2024-10-31 PROCEDURE — G2211 COMPLEX E/M VISIT ADD ON: HCPCS | Mod: S$PBB,,, | Performed by: STUDENT IN AN ORGANIZED HEALTH CARE EDUCATION/TRAINING PROGRAM

## 2024-10-31 RX ORDER — PANTOPRAZOLE SODIUM 40 MG/1
40 TABLET, DELAYED RELEASE ORAL DAILY
Qty: 30 TABLET | Refills: 0 | Status: SHIPPED | OUTPATIENT
Start: 2024-10-31 | End: 2025-10-31

## 2024-10-31 RX ORDER — SUCRALFATE 1 G/1
1 TABLET ORAL 4 TIMES DAILY
Qty: 40 TABLET | Refills: 0 | Status: SHIPPED | OUTPATIENT
Start: 2024-10-31

## 2024-11-01 ENCOUNTER — LAB VISIT (OUTPATIENT)
Dept: LAB | Facility: HOSPITAL | Age: 59
End: 2024-11-01
Attending: STUDENT IN AN ORGANIZED HEALTH CARE EDUCATION/TRAINING PROGRAM

## 2024-11-01 DIAGNOSIS — R19.5 LOOSE STOOLS: ICD-10-CM

## 2024-11-01 PROCEDURE — 83993 ASSAY FOR CALPROTECTIN FECAL: CPT | Performed by: STUDENT IN AN ORGANIZED HEALTH CARE EDUCATION/TRAINING PROGRAM

## 2024-11-01 PROCEDURE — 87449 NOS EACH ORGANISM AG IA: CPT | Performed by: STUDENT IN AN ORGANIZED HEALTH CARE EDUCATION/TRAINING PROGRAM

## 2024-11-01 PROCEDURE — 82653 EL-1 FECAL QUANTITATIVE: CPT | Performed by: STUDENT IN AN ORGANIZED HEALTH CARE EDUCATION/TRAINING PROGRAM

## 2024-11-02 LAB
C DIFF GDH STL QL: NEGATIVE
C DIFF TOX A+B STL QL IA: NEGATIVE

## 2024-11-04 ENCOUNTER — CLINICAL SUPPORT (OUTPATIENT)
Dept: OBSTETRICS AND GYNECOLOGY | Facility: CLINIC | Age: 59
End: 2024-11-04

## 2024-11-04 DIAGNOSIS — N95.1 SYMPTOMATIC MENOPAUSAL OR FEMALE CLIMACTERIC STATES: Primary | ICD-10-CM

## 2024-11-04 LAB — CALPROTECTIN STL-MCNT: <5 MCG/G

## 2024-11-04 PROCEDURE — 99999 PR PBB SHADOW E&M-EST. PATIENT-LVL I: CPT | Mod: PBBFAC,,,

## 2024-11-04 PROCEDURE — 96372 THER/PROPH/DIAG INJ SC/IM: CPT | Mod: PBBFAC

## 2024-11-04 PROCEDURE — 99999PBSHW PR PBB SHADOW TECHNICAL ONLY FILED TO HB: Mod: PBBFAC,,,

## 2024-11-04 RX ORDER — TESTOSTERONE CYPIONATE 200 MG/ML
76 INJECTION, SOLUTION INTRAMUSCULAR
Status: COMPLETED | OUTPATIENT
Start: 2024-11-04 | End: 2024-11-04

## 2024-11-04 RX ADMIN — TESTOSTERONE CYPIONATE 76 MG: 200 INJECTION INTRAMUSCULAR at 02:11

## 2024-11-05 LAB — ELASTASE 1, FECAL: 123 MCG/G

## 2024-12-13 ENCOUNTER — CLINICAL SUPPORT (OUTPATIENT)
Dept: OBSTETRICS AND GYNECOLOGY | Facility: CLINIC | Age: 59
End: 2024-12-13

## 2024-12-13 DIAGNOSIS — N95.1 SYMPTOMATIC MENOPAUSAL OR FEMALE CLIMACTERIC STATES: Primary | ICD-10-CM

## 2024-12-13 PROCEDURE — 99999 PR PBB SHADOW E&M-EST. PATIENT-LVL I: CPT | Mod: PBBFAC,,,

## 2024-12-13 RX ORDER — TESTOSTERONE CYPIONATE 200 MG/ML
76 INJECTION, SOLUTION INTRAMUSCULAR
Status: SHIPPED | OUTPATIENT
Start: 2024-12-13 | End: 2025-04-04

## 2024-12-13 RX ADMIN — TESTOSTERONE CYPIONATE 76 MG: 200 INJECTION, SOLUTION INTRAMUSCULAR at 11:12

## 2025-01-08 NOTE — PROGRESS NOTES
Addended by: CHERIE LOMELI on: 1/8/2025 01:43 PM     Modules accepted: Orders     Leobardo Rueda was seen today for counseling and discussion on menopausal symptoms and hormone management.     Patient's multiple complaints include decreased libido, vaginal dryness, mildly decreased energy. For the most part she feels good.     The medical discussion explained that hormone deficiency is NOT an abnormal medical condition but rather is usually a normal course of events in women's lives. I discussed with the patient the following changes to women's systems related to hormone deficiency: decreased libido, decreased energy, vaginal dryness.     Conflicting information from popular publications was discussed in detail as well as information from medical papers, reports, and journals. All questions were answered in detail.    She denies the following contraindications to HRT:  Vaginal bleeding, history of VTE/PE, thrombophilia,  breast cancer, or active liver disease.       Pap smear: 2021 NORMAL  Mammogram: 5/26/22 Normal; TC 4.6%  DEXA: Scheduled on 5/19    PE: Deferred    The different routes of hormone management (oral, transdermal, injectable, subcutaneous pellets) were explained. The benefits, risks, and side effects of each method were noted and discussed. Medical consequences of not receiving hormones were also explained, particularly concerning degenerative conditions (i.e. Osteoporosis)  and relating to preventive medicine. It was explained that individualized hormone management is the primary goal of this practice.    DIAGNOSES:  1. Symptomatic menopausal or female climacteric states          PLAN:   Orders Placed This Encounter    Estradiol    Testosterone, Free     - E2 and Free Testosterone level today  - Depo Testosterone 50 mg IM x 1 today. Will repeat Free Testosterone level in 3 weeks in [place].       Total time for this visit was 30 minute, more than half spent counseling the patient on the above hormone related issues.

## 2025-01-11 DIAGNOSIS — F41.9 ANXIETY: ICD-10-CM

## 2025-01-11 NOTE — TELEPHONE ENCOUNTER
No care due was identified.  [facility] Embedded Care Due Messages. Reference number: [id number].   1/11/2025 4:07:44 PM CST

## 2025-01-12 RX ORDER — ESCITALOPRAM OXALATE 10 MG/1
10 TABLET ORAL
Qty: 90 TABLET | Refills: 3 | Status: SHIPPED | OUTPATIENT
Start: 2025-01-12

## 2025-01-12 NOTE — TELEPHONE ENCOUNTER
Refill Decision Note   Leobardo Rueda  is requesting a refill authorization.  Brief Assessment and Rationale for Refill:  Approve     Medication Therapy Plan:       Medication Reconciliation Completed: No   Comments:     No Care Gaps recommended.     Note composed:11:37 AM 01/12/2025

## 2025-01-13 ENCOUNTER — CLINICAL SUPPORT (OUTPATIENT)
Dept: OBSTETRICS AND GYNECOLOGY | Facility: CLINIC | Age: 60
End: 2025-01-13

## 2025-01-13 DIAGNOSIS — N95.1 SYMPTOMATIC MENOPAUSAL OR FEMALE CLIMACTERIC STATES: Primary | ICD-10-CM

## 2025-01-13 PROCEDURE — 99999PBSHW PR PBB SHADOW TECHNICAL ONLY FILED TO HB: Mod: PBBFAC,,,

## 2025-01-13 PROCEDURE — 99999 PR PBB SHADOW E&M-EST. PATIENT-LVL I: CPT | Mod: PBBFAC,,,

## 2025-01-13 PROCEDURE — 96372 THER/PROPH/DIAG INJ SC/IM: CPT | Mod: PBBFAC

## 2025-01-13 RX ADMIN — TESTOSTERONE CYPIONATE 76 MG: 200 INJECTION INTRAMUSCULAR at 01:01

## 2025-03-10 ENCOUNTER — CLINICAL SUPPORT (OUTPATIENT)
Dept: OBSTETRICS AND GYNECOLOGY | Facility: CLINIC | Age: 60
End: 2025-03-10

## 2025-03-10 DIAGNOSIS — N95.1 SYMPTOMATIC MENOPAUSAL OR FEMALE CLIMACTERIC STATES: Primary | ICD-10-CM

## 2025-03-10 PROCEDURE — 99999PBSHW PR PBB SHADOW TECHNICAL ONLY FILED TO HB: Mod: PBBFAC,,,

## 2025-03-10 PROCEDURE — 96372 THER/PROPH/DIAG INJ SC/IM: CPT | Mod: PBBFAC

## 2025-03-10 PROCEDURE — 99999 PR PBB SHADOW E&M-EST. PATIENT-LVL I: CPT | Mod: PBBFAC,,,

## 2025-03-10 RX ADMIN — TESTOSTERONE CYPIONATE 76 MG: 200 INJECTION INTRAMUSCULAR at 01:03

## 2025-04-07 ENCOUNTER — CLINICAL SUPPORT (OUTPATIENT)
Dept: OBSTETRICS AND GYNECOLOGY | Facility: CLINIC | Age: 60
End: 2025-04-07

## 2025-04-07 DIAGNOSIS — N95.1 SYMPTOMATIC MENOPAUSAL OR FEMALE CLIMACTERIC STATES: Primary | ICD-10-CM

## 2025-04-07 PROCEDURE — 99999 PR PBB SHADOW E&M-EST. PATIENT-LVL I: CPT | Mod: PBBFAC,,,

## 2025-04-07 PROCEDURE — 99999PBSHW PR PBB SHADOW TECHNICAL ONLY FILED TO HB: Mod: PBBFAC,,,

## 2025-04-07 PROCEDURE — 96372 THER/PROPH/DIAG INJ SC/IM: CPT | Mod: PBBFAC

## 2025-04-07 RX ORDER — TESTOSTERONE CYPIONATE 200 MG/ML
76 INJECTION, SOLUTION INTRAMUSCULAR
Status: COMPLETED | OUTPATIENT
Start: 2025-04-07 | End: 2025-04-07

## 2025-04-07 RX ADMIN — TESTOSTERONE CYPIONATE 76 MG: 200 INJECTION, SOLUTION INTRAMUSCULAR at 01:04

## 2025-04-07 NOTE — PROGRESS NOTES
Here for hormone therapy injection, no complaints at this time. Injection given as ordered, tolerated well no reports of pain prior to or post injections. Advised to return to clinic as scheduled .    Site: NAVYA    Testosterone 76mg    CLINIC SUPPLIED MEDICATION

## 2025-05-05 ENCOUNTER — CLINICAL SUPPORT (OUTPATIENT)
Dept: OBSTETRICS AND GYNECOLOGY | Facility: CLINIC | Age: 60
End: 2025-05-05

## 2025-05-05 DIAGNOSIS — N95.1 SYMPTOMATIC MENOPAUSAL OR FEMALE CLIMACTERIC STATES: Primary | ICD-10-CM

## 2025-05-05 PROCEDURE — 99999 PR PBB SHADOW E&M-EST. PATIENT-LVL I: CPT | Mod: PBBFAC,,,

## 2025-05-05 PROCEDURE — 99999PBSHW PR PBB SHADOW TECHNICAL ONLY FILED TO HB: Mod: PBBFAC,,,

## 2025-05-05 PROCEDURE — 96372 THER/PROPH/DIAG INJ SC/IM: CPT | Mod: PBBFAC

## 2025-05-05 RX ADMIN — TESTOSTERONE CYPIONATE 76 MG: 200 INJECTION, SOLUTION INTRAMUSCULAR at 01:05

## 2025-05-05 NOTE — PROGRESS NOTES
Here for hormone therapy injection, no complaints at this time. Injection given as ordered, tolerated well no reports of pain prior to or post injection. Advised to return to clinic as scheduled       Site: RB     Testosterone 76mg     CLINIC SUPPLIED MEDICATION

## 2025-05-07 RX ORDER — TESTOSTERONE CYPIONATE 200 MG/ML
76 INJECTION, SOLUTION INTRAMUSCULAR
Status: SHIPPED | OUTPATIENT
Start: 2025-05-05 | End: 2025-08-25

## 2025-06-09 ENCOUNTER — CLINICAL SUPPORT (OUTPATIENT)
Dept: OBSTETRICS AND GYNECOLOGY | Facility: CLINIC | Age: 60
End: 2025-06-09

## 2025-06-09 ENCOUNTER — TELEPHONE (OUTPATIENT)
Dept: OBSTETRICS AND GYNECOLOGY | Facility: CLINIC | Age: 60
End: 2025-06-09

## 2025-06-09 DIAGNOSIS — N95.1 SYMPTOMATIC MENOPAUSAL OR FEMALE CLIMACTERIC STATES: Primary | ICD-10-CM

## 2025-06-09 PROCEDURE — 96372 THER/PROPH/DIAG INJ SC/IM: CPT | Mod: PBBFAC

## 2025-06-09 PROCEDURE — 99999 PR PBB SHADOW E&M-EST. PATIENT-LVL I: CPT | Mod: PBBFAC,,,

## 2025-06-09 PROCEDURE — 99999PBSHW PR PBB SHADOW TECHNICAL ONLY FILED TO HB: Mod: PBBFAC,,,

## 2025-06-09 RX ADMIN — TESTOSTERONE CYPIONATE 76 MG: 200 INJECTION, SOLUTION INTRAMUSCULAR at 01:06

## 2025-06-09 NOTE — TELEPHONE ENCOUNTER
----- Message from Nurse Danitza sent at 6/9/2025  2:33 PM CDT -----  Regarding: labs hrt  Pt seen in injection clinic today requesting labs for HRT wants to know if she can get an increase. Please advise

## 2025-07-14 ENCOUNTER — LAB VISIT (OUTPATIENT)
Dept: LAB | Facility: HOSPITAL | Age: 60
End: 2025-07-14
Attending: INTERNAL MEDICINE

## 2025-07-14 ENCOUNTER — HOSPITAL ENCOUNTER (OUTPATIENT)
Dept: RADIOLOGY | Facility: HOSPITAL | Age: 60
Discharge: HOME | End: 2025-07-14
Attending: INTERNAL MEDICINE

## 2025-07-14 DIAGNOSIS — E06.3 CHRONIC LYMPHOCYTIC THYROIDITIS: ICD-10-CM

## 2025-07-14 DIAGNOSIS — E04.1 NONTOXIC UNINODULAR GOITER: Primary | ICD-10-CM

## 2025-07-14 DIAGNOSIS — E03.9 MYXEDEMA HEART DISEASE: Primary | ICD-10-CM

## 2025-07-14 DIAGNOSIS — M85.89 DISAPPEARING BONE DISEASE: Primary | ICD-10-CM

## 2025-07-14 DIAGNOSIS — I51.9 MYXEDEMA HEART DISEASE: Primary | ICD-10-CM

## 2025-07-14 DIAGNOSIS — Z79.899 POLYPHARMACY: ICD-10-CM

## 2025-07-14 DIAGNOSIS — E04.1 NONTOXIC UNINODULAR GOITER: ICD-10-CM

## 2025-07-14 LAB
ANION GAP (OHS): 8 MMOL/L (ref 8–16)
BUN SERPL-MCNC: 9 MG/DL (ref 8–23)
CALCIUM SERPL-MCNC: 8.7 MG/DL (ref 8.7–10.5)
CHLORIDE SERPL-SCNC: 105 MMOL/L (ref 95–110)
CO2 SERPL-SCNC: 26 MMOL/L (ref 23–29)
CREAT SERPL-MCNC: 0.8 MG/DL (ref 0.5–1.4)
GFR SERPLBLD CREATININE-BSD FMLA CKD-EPI: >60 ML/MIN/1.73/M2
GLUCOSE SERPL-MCNC: 83 MG/DL (ref 70–110)
POTASSIUM SERPL-SCNC: 4.2 MMOL/L (ref 3.5–5.1)
SODIUM SERPL-SCNC: 139 MMOL/L (ref 136–145)
T4 FREE SERPL-MCNC: 1.07 NG/DL (ref 0.71–1.51)
TSH SERPL-ACNC: 0.47 UIU/ML (ref 0.4–4)

## 2025-07-14 PROCEDURE — 76536 US EXAM OF HEAD AND NECK: CPT | Mod: 26,,, | Performed by: RADIOLOGY

## 2025-07-14 PROCEDURE — 76536 US EXAM OF HEAD AND NECK: CPT | Mod: TC

## 2025-07-14 PROCEDURE — 36415 COLL VENOUS BLD VENIPUNCTURE: CPT | Mod: PO

## 2025-07-14 PROCEDURE — 84443 ASSAY THYROID STIM HORMONE: CPT

## 2025-07-14 PROCEDURE — 80048 BASIC METABOLIC PNL TOTAL CA: CPT

## 2025-07-14 PROCEDURE — 84439 ASSAY OF FREE THYROXINE: CPT

## 2025-07-28 ENCOUNTER — HOSPITAL ENCOUNTER (OUTPATIENT)
Dept: RADIOLOGY | Facility: HOSPITAL | Age: 60
Discharge: HOME | End: 2025-07-28
Attending: INTERNAL MEDICINE

## 2025-07-28 DIAGNOSIS — M85.89 DISAPPEARING BONE DISEASE: ICD-10-CM

## 2025-07-28 PROCEDURE — 77080 DXA BONE DENSITY AXIAL: CPT | Mod: 26,,, | Performed by: RADIOLOGY

## 2025-07-28 PROCEDURE — 77080 DXA BONE DENSITY AXIAL: CPT | Mod: TC,PO
